# Patient Record
Sex: FEMALE | Race: WHITE | NOT HISPANIC OR LATINO | Employment: FULL TIME | URBAN - METROPOLITAN AREA
[De-identification: names, ages, dates, MRNs, and addresses within clinical notes are randomized per-mention and may not be internally consistent; named-entity substitution may affect disease eponyms.]

---

## 2018-05-20 ENCOUNTER — HOSPITAL ENCOUNTER (EMERGENCY)
Facility: HOSPITAL | Age: 63
Discharge: HOME | End: 2018-05-20
Attending: FAMILY MEDICINE

## 2018-05-20 VITALS
TEMPERATURE: 98 F | RESPIRATION RATE: 13 BRPM | HEART RATE: 67 BPM | SYSTOLIC BLOOD PRESSURE: 124 MMHG | OXYGEN SATURATION: 95 % | DIASTOLIC BLOOD PRESSURE: 70 MMHG

## 2018-05-20 DIAGNOSIS — R07.9 CHEST PAIN: ICD-10-CM

## 2018-05-20 DIAGNOSIS — M79.603 ARM PAIN: Primary | ICD-10-CM

## 2018-05-20 LAB
ALBUMIN SERPL BCP-MCNC: 4.1 G/DL
ALP SERPL-CCNC: 68 U/L
ALT SERPL W/O P-5'-P-CCNC: 67 U/L
ANION GAP SERPL CALC-SCNC: 8 MMOL/L
AST SERPL-CCNC: 51 U/L
BASOPHILS # BLD AUTO: 0.05 K/UL
BASOPHILS NFR BLD: 0.7 %
BILIRUB SERPL-MCNC: 0.5 MG/DL
BNP SERPL-MCNC: 56 PG/ML
BUN SERPL-MCNC: 12 MG/DL
CALCIUM SERPL-MCNC: 8.9 MG/DL
CHLORIDE SERPL-SCNC: 105 MMOL/L
CO2 SERPL-SCNC: 26 MMOL/L
CREAT SERPL-MCNC: 0.9 MG/DL
DIFFERENTIAL METHOD: NORMAL
EOSINOPHIL # BLD AUTO: 0.2 K/UL
EOSINOPHIL NFR BLD: 2.7 %
ERYTHROCYTE [DISTWIDTH] IN BLOOD BY AUTOMATED COUNT: 12.6 %
EST. GFR  (AFRICAN AMERICAN): >60 ML/MIN/1.73 M^2
EST. GFR  (NON AFRICAN AMERICAN): >60 ML/MIN/1.73 M^2
GLUCOSE SERPL-MCNC: 133 MG/DL
HCT VFR BLD AUTO: 43.5 %
HGB BLD-MCNC: 14.9 G/DL
IMM GRANULOCYTES # BLD AUTO: 0.02 K/UL
IMM GRANULOCYTES NFR BLD AUTO: 0.3 %
LYMPHOCYTES # BLD AUTO: 3.5 K/UL
LYMPHOCYTES NFR BLD: 48 %
MCH RBC QN AUTO: 30.8 PG
MCHC RBC AUTO-ENTMCNC: 34.3 G/DL
MCV RBC AUTO: 90 FL
MONOCYTES # BLD AUTO: 0.5 K/UL
MONOCYTES NFR BLD: 7.2 %
NEUTROPHILS # BLD AUTO: 3 K/UL
NEUTROPHILS NFR BLD: 41.1 %
NRBC BLD-RTO: 0 /100 WBC
PLATELET # BLD AUTO: 224 K/UL
PMV BLD AUTO: 9.5 FL
POTASSIUM SERPL-SCNC: 3.6 MMOL/L
PROT SERPL-MCNC: 7.6 G/DL
RBC # BLD AUTO: 4.83 M/UL
SODIUM SERPL-SCNC: 139 MMOL/L
TROPONIN I SERPL DL<=0.01 NG/ML-MCNC: 0.06 NG/ML
TROPONIN I SERPL DL<=0.01 NG/ML-MCNC: 0.11 NG/ML
WBC # BLD AUTO: 7.34 K/UL

## 2018-05-20 PROCEDURE — 96375 TX/PRO/DX INJ NEW DRUG ADDON: CPT

## 2018-05-20 PROCEDURE — 99284 EMERGENCY DEPT VISIT MOD MDM: CPT | Mod: 25

## 2018-05-20 PROCEDURE — 63600175 PHARM REV CODE 636 W HCPCS: Performed by: FAMILY MEDICINE

## 2018-05-20 PROCEDURE — 83880 ASSAY OF NATRIURETIC PEPTIDE: CPT

## 2018-05-20 PROCEDURE — 93005 ELECTROCARDIOGRAM TRACING: CPT

## 2018-05-20 PROCEDURE — 80053 COMPREHEN METABOLIC PANEL: CPT

## 2018-05-20 PROCEDURE — 84484 ASSAY OF TROPONIN QUANT: CPT | Mod: 91

## 2018-05-20 PROCEDURE — 25000003 PHARM REV CODE 250: Performed by: FAMILY MEDICINE

## 2018-05-20 PROCEDURE — 71045 X-RAY EXAM CHEST 1 VIEW: CPT | Mod: 26,,, | Performed by: RADIOLOGY

## 2018-05-20 PROCEDURE — 96374 THER/PROPH/DIAG INJ IV PUSH: CPT

## 2018-05-20 PROCEDURE — 85025 COMPLETE CBC W/AUTO DIFF WBC: CPT

## 2018-05-20 PROCEDURE — 71045 X-RAY EXAM CHEST 1 VIEW: CPT | Mod: TC,FY

## 2018-05-20 RX ORDER — ASPIRIN 325 MG
325 TABLET ORAL
Status: DISCONTINUED | OUTPATIENT
Start: 2018-05-20 | End: 2018-05-20

## 2018-05-20 RX ORDER — NITROGLYCERIN 0.4 MG/1
0.4 TABLET SUBLINGUAL EVERY 5 MIN PRN
Qty: 30 TABLET | Refills: 0 | Status: SHIPPED | OUTPATIENT
Start: 2018-05-20 | End: 2021-01-12 | Stop reason: CLARIF

## 2018-05-20 RX ORDER — CARVEDILOL 12.5 MG/1
12.5 TABLET ORAL 2 TIMES DAILY WITH MEALS
COMMUNITY
End: 2020-02-14 | Stop reason: CLARIF

## 2018-05-20 RX ORDER — ASPIRIN 81 MG/1
81 TABLET ORAL DAILY
COMMUNITY

## 2018-05-20 RX ORDER — GABAPENTIN 300 MG/1
300 CAPSULE ORAL NIGHTLY
COMMUNITY
End: 2020-02-14 | Stop reason: CLARIF

## 2018-05-20 RX ORDER — ONDANSETRON 2 MG/ML
4 INJECTION INTRAMUSCULAR; INTRAVENOUS
Status: COMPLETED | OUTPATIENT
Start: 2018-05-20 | End: 2018-05-20

## 2018-05-20 RX ORDER — LOSARTAN POTASSIUM 25 MG/1
25 TABLET ORAL DAILY
COMMUNITY
End: 2021-01-12 | Stop reason: CLARIF

## 2018-05-20 RX ORDER — FUROSEMIDE 40 MG/1
40 TABLET ORAL DAILY
COMMUNITY
End: 2021-01-12 | Stop reason: CLARIF

## 2018-05-20 RX ORDER — MORPHINE SULFATE 4 MG/ML
6 INJECTION, SOLUTION INTRAMUSCULAR; INTRAVENOUS
Status: COMPLETED | OUTPATIENT
Start: 2018-05-20 | End: 2018-05-20

## 2018-05-20 RX ORDER — DIGOXIN 250 MCG
250 TABLET ORAL DAILY
COMMUNITY
End: 2020-02-14 | Stop reason: CLARIF

## 2018-05-20 RX ORDER — PAROXETINE HYDROCHLORIDE 20 MG/1
20 TABLET, FILM COATED ORAL EVERY MORNING
COMMUNITY
End: 2020-01-02

## 2018-05-20 RX ADMIN — NITROGLYCERIN 0.5 INCH: 20 OINTMENT TOPICAL at 03:05

## 2018-05-20 RX ADMIN — ONDANSETRON 4 MG: 2 INJECTION INTRAMUSCULAR; INTRAVENOUS at 03:05

## 2018-05-20 RX ADMIN — MORPHINE SULFATE 6 MG: 4 INJECTION INTRAVENOUS at 03:05

## 2018-05-20 NOTE — ED PROVIDER NOTES
Encounter Date: 5/20/2018       History     Chief Complaint   Patient presents with    Arm Pain     Pt awoke with left arm and chest pain around 0230. Pain 7/10. No shortness of breath, no diaphoresis, no nausea. Hx of large MI with 2 stents placed. Sees Dr. Phillips.           Review of patient's allergies indicates:   Allergen Reactions    Demerol [meperidine]      Past Medical History:   Diagnosis Date    Coronary artery disease     Diabetes mellitus      No past surgical history on file.  No family history on file.  Social History   Substance Use Topics    Smoking status: Not on file    Smokeless tobacco: Not on file    Alcohol use Not on file     Review of Systems   Constitutional: Negative.    HENT: Negative.    Eyes: Negative.    Respiratory: Positive for chest tightness. Negative for shortness of breath.    Cardiovascular: Positive for chest pain.   Gastrointestinal: Negative.    Endocrine: Negative.    Genitourinary: Negative.    Musculoskeletal: Negative.    Allergic/Immunologic: Negative.    Neurological: Negative.    Hematological: Negative.    Psychiatric/Behavioral: Negative.        Physical Exam     Initial Vitals [05/20/18 0235]   BP Pulse Resp Temp SpO2   (!) 143/87 79 17 97.7 °F (36.5 °C) 97 %      MAP       105.67         Physical Exam    Nursing note and vitals reviewed.  Constitutional: She appears well-developed and well-nourished. She is not diaphoretic. No distress.   HENT:   Head: Normocephalic and atraumatic.   Eyes: Conjunctivae and EOM are normal. Pupils are equal, round, and reactive to light.   Neck: Normal range of motion. Neck supple.   Cardiovascular: Normal rate, regular rhythm, normal heart sounds and intact distal pulses. Exam reveals no gallop and no friction rub.    No murmur heard.  Pulmonary/Chest: Breath sounds normal. No respiratory distress. She has no wheezes. She has no rales.   Abdominal: Soft. Bowel sounds are normal. She exhibits no distension. There is no  tenderness.   Musculoskeletal: Normal range of motion. She exhibits no edema.   Neurological: She is alert and oriented to person, place, and time. She has normal strength.   Skin: Skin is warm and dry. Capillary refill takes less than 2 seconds. No rash noted. No erythema.   Psychiatric: She has a normal mood and affect. Her behavior is normal. Judgment and thought content normal.         ED Course   Procedures  Labs Reviewed   CBC W/ AUTO DIFFERENTIAL   COMPREHENSIVE METABOLIC PANEL   TROPONIN I   TROPONIN I   B-TYPE NATRIURETIC PEPTIDE             Medical Decision Making:   ED Management:  Chest pain onset at 0230 with relief after nitro. Assumed care of this pt at 0600, she has demonstrated two negative troponins and remains chest pain free. Discussed results with the pt, who is already established with cardiology. She would like to follow up with Dr. Phillips on Monday and agrees to return to ED should symptoms return, worsen, or persist.                       Clinical Impression:   The primary encounter diagnosis was Arm pain. A diagnosis of Chest pain was also pertinent to this visit.                           Marcella Carson MD  05/20/18 4928

## 2018-05-20 NOTE — ED NOTES
No acute changes since arrival. Pt remains on cardiac monitor.  Updated on plan of care. Will repeat troponin at 0550. Warm blanket provided. Will continue to monitor.

## 2018-05-20 NOTE — ED NOTES
Pt resting comfortably with lights dimmed. Remains on cardiac monitor. NSR on cardiac monitor. Updated on plan of care. Will continue to monitor.

## 2020-01-02 ENCOUNTER — HOSPITAL ENCOUNTER (EMERGENCY)
Facility: HOSPITAL | Age: 65
Discharge: HOME | End: 2020-01-02
Attending: FAMILY MEDICINE
Payer: COMMERCIAL

## 2020-01-02 VITALS
HEIGHT: 68 IN | OXYGEN SATURATION: 94 % | DIASTOLIC BLOOD PRESSURE: 59 MMHG | HEART RATE: 75 BPM | BODY MASS INDEX: 30.16 KG/M2 | TEMPERATURE: 98 F | SYSTOLIC BLOOD PRESSURE: 109 MMHG | RESPIRATION RATE: 10 BRPM | WEIGHT: 199 LBS

## 2020-01-02 DIAGNOSIS — R00.2 PALPITATION: ICD-10-CM

## 2020-01-02 LAB
ALBUMIN SERPL BCP-MCNC: 4.3 G/DL (ref 3.5–5.2)
ALP SERPL-CCNC: 73 U/L (ref 55–135)
ALT SERPL W/O P-5'-P-CCNC: 27 U/L (ref 10–44)
ANION GAP SERPL CALC-SCNC: 17 MMOL/L (ref 8–16)
AST SERPL-CCNC: 23 U/L (ref 10–40)
BASOPHILS # BLD AUTO: 0.04 K/UL (ref 0–0.2)
BASOPHILS NFR BLD: 0.4 % (ref 0–1.9)
BILIRUB SERPL-MCNC: 0.5 MG/DL (ref 0.1–1)
BNP SERPL-MCNC: 104 PG/ML (ref 0–99)
BUN SERPL-MCNC: 17 MG/DL (ref 8–23)
CALCIUM SERPL-MCNC: 9.2 MG/DL (ref 8.7–10.5)
CHLORIDE SERPL-SCNC: 103 MMOL/L (ref 95–110)
CO2 SERPL-SCNC: 17 MMOL/L (ref 23–29)
CREAT SERPL-MCNC: 0.7 MG/DL (ref 0.5–1.4)
DIFFERENTIAL METHOD: ABNORMAL
EOSINOPHIL # BLD AUTO: 0.1 K/UL (ref 0–0.5)
EOSINOPHIL NFR BLD: 0.9 % (ref 0–8)
ERYTHROCYTE [DISTWIDTH] IN BLOOD BY AUTOMATED COUNT: 12.5 % (ref 11.5–14.5)
EST. GFR  (AFRICAN AMERICAN): >60 ML/MIN/1.73 M^2
EST. GFR  (NON AFRICAN AMERICAN): >60 ML/MIN/1.73 M^2
GLUCOSE SERPL-MCNC: 188 MG/DL (ref 70–110)
HCT VFR BLD AUTO: 45.6 % (ref 37–48.5)
HGB BLD-MCNC: 15.2 G/DL (ref 12–16)
IMM GRANULOCYTES # BLD AUTO: 0.02 K/UL (ref 0–0.04)
IMM GRANULOCYTES NFR BLD AUTO: 0.2 % (ref 0–0.5)
LYMPHOCYTES # BLD AUTO: 3.4 K/UL (ref 1–4.8)
LYMPHOCYTES NFR BLD: 36.5 % (ref 18–48)
MCH RBC QN AUTO: 30.3 PG (ref 27–31)
MCHC RBC AUTO-ENTMCNC: 33.3 G/DL (ref 32–36)
MCV RBC AUTO: 91 FL (ref 82–98)
MONOCYTES # BLD AUTO: 0.6 K/UL (ref 0.3–1)
MONOCYTES NFR BLD: 6.2 % (ref 4–15)
NEUTROPHILS # BLD AUTO: 5.2 K/UL (ref 1.8–7.7)
NEUTROPHILS NFR BLD: 55.8 % (ref 38–73)
NRBC BLD-RTO: 0 /100 WBC
PLATELET # BLD AUTO: 231 K/UL (ref 150–350)
PMV BLD AUTO: 8.9 FL (ref 9.2–12.9)
POCT GLUCOSE: 167 MG/DL (ref 70–110)
POTASSIUM SERPL-SCNC: 3.3 MMOL/L (ref 3.5–5.1)
PROT SERPL-MCNC: 8.2 G/DL (ref 6–8.4)
RBC # BLD AUTO: 5.01 M/UL (ref 4–5.4)
SODIUM SERPL-SCNC: 137 MMOL/L (ref 136–145)
TROPONIN I SERPL DL<=0.01 NG/ML-MCNC: <0.01 NG/ML (ref 0.02–0.5)
WBC # BLD AUTO: 9.24 K/UL (ref 3.9–12.7)

## 2020-01-02 PROCEDURE — 84484 ASSAY OF TROPONIN QUANT: CPT

## 2020-01-02 PROCEDURE — 93010 EKG 12-LEAD: ICD-10-PCS | Mod: ,,, | Performed by: INTERNAL MEDICINE

## 2020-01-02 PROCEDURE — 93010 ELECTROCARDIOGRAM REPORT: CPT | Mod: ,,, | Performed by: INTERNAL MEDICINE

## 2020-01-02 PROCEDURE — 85025 COMPLETE CBC W/AUTO DIFF WBC: CPT

## 2020-01-02 PROCEDURE — 80053 COMPREHEN METABOLIC PANEL: CPT

## 2020-01-02 PROCEDURE — 36415 COLL VENOUS BLD VENIPUNCTURE: CPT

## 2020-01-02 PROCEDURE — 82962 GLUCOSE BLOOD TEST: CPT

## 2020-01-02 PROCEDURE — 83880 ASSAY OF NATRIURETIC PEPTIDE: CPT

## 2020-01-02 PROCEDURE — 99284 EMERGENCY DEPT VISIT MOD MDM: CPT | Mod: 25

## 2020-01-02 PROCEDURE — 93005 ELECTROCARDIOGRAM TRACING: CPT

## 2020-01-02 RX ORDER — BUPROPION HYDROCHLORIDE 300 MG/1
300 TABLET ORAL DAILY
COMMUNITY
End: 2020-02-14 | Stop reason: CLARIF

## 2020-01-02 RX ORDER — SPIRONOLACTONE 25 MG/1
25 TABLET ORAL DAILY
COMMUNITY
End: 2021-01-12 | Stop reason: CLARIF

## 2020-01-03 NOTE — DISCHARGE INSTRUCTIONS
Follow-up with Dr. Ritchie for your cardiologist, if symptoms change or return see MD or return to the ER

## 2020-01-03 NOTE — ED PROVIDER NOTES
Encounter Date: 1/2/2020       History     Chief Complaint   Patient presents with    pacemaker fired     64-year-old female presents complaining of stating her pacemaker defibrillator fired she denies sensation prior or post of palpitations and had no syncope or near-syncope pacemakers approximately 9 years old her prior pacemaker was replaced at 10 years she denies any trauma to the chest and she is followed by cardiologist in [facility] Dr. Phillips        Review of patient's allergies indicates:   Allergen Reactions    Demerol [meperidine]      Past Medical History:   Diagnosis Date    Coronary artery disease     Diabetes mellitus      Past Surgical History:   Procedure Laterality Date    A-V CARDIAC PACEMAKER INSERTION      HYSTERECTOMY      INSERTION OF PACEMAKER      REMOVAL OF IMPLANTED CARDIOVERTER-DEFIBRILLATOR (ICD)       History reviewed. No pertinent family history.  Social History     Tobacco Use    Smoking status: Never Smoker   Substance Use Topics    Alcohol use: Yes    Drug use: Never     Review of Systems   Constitutional: Negative for fever.   HENT: Negative for sore throat.    Respiratory: Negative for shortness of breath.    Cardiovascular: Negative for chest pain.   Gastrointestinal: Negative for nausea.   Genitourinary: Negative for dysuria.   Musculoskeletal: Negative for back pain.   Skin: Negative for rash.   Neurological: Negative for weakness.   Hematological: Does not bruise/bleed easily.       Physical Exam     Initial Vitals [01/02/20 2122]   BP Pulse Resp Temp SpO2   121/69 86 18 98 °F (36.7 °C) 95 %      MAP       --         Physical Exam    Nursing note and vitals reviewed.  Constitutional: She appears well-developed and well-nourished. She is not diaphoretic. No distress.   HENT:   Head: Normocephalic and atraumatic.   Right Ear: External ear normal.   Left Ear: External ear normal.   Eyes: Pupils are equal, round, and reactive to light. Right eye exhibits no discharge.  Left eye exhibits no discharge.   Neck: No tracheal deviation present. No JVD present.   Cardiovascular: Exam reveals no friction rub.    No murmur heard.  Pulmonary/Chest: No stridor. No respiratory distress. She has no wheezes. She has no rales.   Abdominal: Bowel sounds are normal. She exhibits no distension.   Musculoskeletal: Normal range of motion.   Neurological: She is alert.   Skin: Skin is warm.   Psychiatric: She has a normal mood and affect.         ED Course   Procedures  Labs Reviewed   CBC W/ AUTO DIFFERENTIAL - Abnormal; Notable for the following components:       Result Value    MPV 8.9 (*)     All other components within normal limits   COMPREHENSIVE METABOLIC PANEL - Abnormal; Notable for the following components:    Potassium 3.3 (*)     CO2 17 (*)     Glucose 188 (*)     Anion Gap 17 (*)     All other components within normal limits   TROPONIN I - Abnormal; Notable for the following components:    Troponin I <0.01 (*)     All other components within normal limits   B-TYPE NATRIURETIC PEPTIDE - Abnormal; Notable for the following components:     (*)     All other components within normal limits   POCT GLUCOSE - Abnormal; Notable for the following components:    POCT Glucose 167 (*)     All other components within normal limits   B-TYPE NATRIURETIC PEPTIDE     EKG Readings: (Independently Interpreted)   Initial Reading: No STEMI. Rhythm: Normal Sinus Rhythm. Heart Rate: 78. Ectopy: No Ectopy. Conduction: Normal. ST Segments: Normal ST Segments. T Waves: Normal.   There are no pacemaker spikes noted on the EKG       Imaging Results    None                                          Clinical Impression:       ICD-10-CM ICD-9-CM   1. Palpitation R00.2 785.1                             Zach Cowart MD  01/03/20 0543       Zach Cowart MD  01/03/20 0544

## 2020-02-06 ENCOUNTER — HOSPITAL ENCOUNTER (EMERGENCY)
Facility: HOSPITAL | Age: 65
Discharge: ANOTHER HOSPITAL | End: 2020-02-07
Attending: EMERGENCY MEDICINE
Payer: COMMERCIAL

## 2020-02-06 DIAGNOSIS — Z45.02 DEFIBRILLATOR DISCHARGE: ICD-10-CM

## 2020-02-06 DIAGNOSIS — R42 LIGHTHEADEDNESS: ICD-10-CM

## 2020-02-06 DIAGNOSIS — I47.20 VENTRICULAR TACHYCARDIA: Primary | ICD-10-CM

## 2020-02-06 LAB
ALBUMIN SERPL BCP-MCNC: 3.9 G/DL (ref 3.5–5.2)
ALP SERPL-CCNC: 77 U/L (ref 55–135)
ALT SERPL W/O P-5'-P-CCNC: 32 U/L (ref 10–44)
ANION GAP SERPL CALC-SCNC: 11 MMOL/L (ref 8–16)
AST SERPL-CCNC: 25 U/L (ref 10–40)
BASOPHILS # BLD AUTO: 0.06 K/UL (ref 0–0.2)
BASOPHILS NFR BLD: 0.7 % (ref 0–1.9)
BILIRUB SERPL-MCNC: 0.6 MG/DL (ref 0.1–1)
BNP SERPL-MCNC: 96 PG/ML (ref 0–99)
BUN SERPL-MCNC: 10 MG/DL (ref 8–23)
CALCIUM SERPL-MCNC: 9 MG/DL (ref 8.7–10.5)
CHLORIDE SERPL-SCNC: 103 MMOL/L (ref 95–110)
CO2 SERPL-SCNC: 24 MMOL/L (ref 23–29)
CREAT SERPL-MCNC: 0.7 MG/DL (ref 0.5–1.4)
DIFFERENTIAL METHOD: ABNORMAL
DIGOXIN SERPL-MCNC: <0.2 NG/ML (ref 0.8–2)
EOSINOPHIL # BLD AUTO: 0.2 K/UL (ref 0–0.5)
EOSINOPHIL NFR BLD: 1.6 % (ref 0–8)
ERYTHROCYTE [DISTWIDTH] IN BLOOD BY AUTOMATED COUNT: 12.2 % (ref 11.5–14.5)
EST. GFR  (AFRICAN AMERICAN): >60 ML/MIN/1.73 M^2
EST. GFR  (NON AFRICAN AMERICAN): >60 ML/MIN/1.73 M^2
GLUCOSE SERPL-MCNC: 180 MG/DL (ref 70–110)
HCT VFR BLD AUTO: 45.9 % (ref 37–48.5)
HGB BLD-MCNC: 15.9 G/DL (ref 12–16)
IMM GRANULOCYTES # BLD AUTO: 0.03 K/UL (ref 0–0.04)
IMM GRANULOCYTES NFR BLD AUTO: 0.3 % (ref 0–0.5)
LYMPHOCYTES # BLD AUTO: 2.6 K/UL (ref 1–4.8)
LYMPHOCYTES NFR BLD: 27.8 % (ref 18–48)
MCH RBC QN AUTO: 31.1 PG (ref 27–31)
MCHC RBC AUTO-ENTMCNC: 34.6 G/DL (ref 32–36)
MCV RBC AUTO: 90 FL (ref 82–98)
MONOCYTES # BLD AUTO: 0.8 K/UL (ref 0.3–1)
MONOCYTES NFR BLD: 8.7 % (ref 4–15)
NEUTROPHILS # BLD AUTO: 5.6 K/UL (ref 1.8–7.7)
NEUTROPHILS NFR BLD: 60.9 % (ref 38–73)
NRBC BLD-RTO: 0 /100 WBC
PLATELET # BLD AUTO: 276 K/UL (ref 150–350)
PMV BLD AUTO: 8.7 FL (ref 9.2–12.9)
POTASSIUM SERPL-SCNC: 3.9 MMOL/L (ref 3.5–5.1)
PROT SERPL-MCNC: 8 G/DL (ref 6–8.4)
RBC # BLD AUTO: 5.11 M/UL (ref 4–5.4)
SODIUM SERPL-SCNC: 138 MMOL/L (ref 136–145)
TROPONIN I SERPL DL<=0.01 NG/ML-MCNC: 0.05 NG/ML (ref 0.02–0.5)
TROPONIN I SERPL DL<=0.01 NG/ML-MCNC: 0.3 NG/ML (ref 0.02–0.5)
WBC # BLD AUTO: 9.21 K/UL (ref 3.9–12.7)

## 2020-02-06 PROCEDURE — 80053 COMPREHEN METABOLIC PANEL: CPT

## 2020-02-06 PROCEDURE — 99285 EMERGENCY DEPT VISIT HI MDM: CPT | Mod: 25

## 2020-02-06 PROCEDURE — 83880 ASSAY OF NATRIURETIC PEPTIDE: CPT

## 2020-02-06 PROCEDURE — 63600175 PHARM REV CODE 636 W HCPCS: Performed by: EMERGENCY MEDICINE

## 2020-02-06 PROCEDURE — 85025 COMPLETE CBC W/AUTO DIFF WBC: CPT

## 2020-02-06 PROCEDURE — 84484 ASSAY OF TROPONIN QUANT: CPT

## 2020-02-06 PROCEDURE — 93005 ELECTROCARDIOGRAM TRACING: CPT

## 2020-02-06 PROCEDURE — 71045 X-RAY EXAM CHEST 1 VIEW: CPT | Mod: TC,FY

## 2020-02-06 PROCEDURE — 71045 XR CHEST AP PORTABLE: ICD-10-PCS | Mod: 26,,, | Performed by: RADIOLOGY

## 2020-02-06 PROCEDURE — 96376 TX/PRO/DX INJ SAME DRUG ADON: CPT

## 2020-02-06 PROCEDURE — 99291 CRITICAL CARE FIRST HOUR: CPT | Mod: 25

## 2020-02-06 PROCEDURE — 96374 THER/PROPH/DIAG INJ IV PUSH: CPT

## 2020-02-06 PROCEDURE — 71045 X-RAY EXAM CHEST 1 VIEW: CPT | Mod: 26,,, | Performed by: RADIOLOGY

## 2020-02-06 PROCEDURE — 80162 ASSAY OF DIGOXIN TOTAL: CPT

## 2020-02-06 RX ORDER — ROSUVASTATIN CALCIUM 20 MG/1
20 TABLET, COATED ORAL DAILY
COMMUNITY
End: 2022-02-28 | Stop reason: ALTCHOICE

## 2020-02-06 RX ORDER — AMIODARONE HYDROCHLORIDE 150 MG/3ML
150 INJECTION, SOLUTION INTRAVENOUS
Status: COMPLETED | OUTPATIENT
Start: 2020-02-06 | End: 2020-02-06

## 2020-02-06 RX ADMIN — AMIODARONE HYDROCHLORIDE 1 MG/MIN: 1.8 INJECTION, SOLUTION INTRAVENOUS at 11:02

## 2020-02-06 RX ADMIN — AMIODARONE HYDROCHLORIDE 150 MG: 50 INJECTION, SOLUTION INTRAVENOUS at 11:02

## 2020-02-07 VITALS
BODY MASS INDEX: 28.79 KG/M2 | WEIGHT: 190 LBS | OXYGEN SATURATION: 97 % | HEIGHT: 68 IN | SYSTOLIC BLOOD PRESSURE: 129 MMHG | TEMPERATURE: 98 F | RESPIRATION RATE: 15 BRPM | HEART RATE: 79 BPM | DIASTOLIC BLOOD PRESSURE: 80 MMHG

## 2020-02-07 NOTE — ED NOTES
"Contacted [facility] regarding transfer status. Per Renea, "we are really busy right now with emegencies, they are next on the list"  "

## 2020-02-07 NOTE — ED TRIAGE NOTES
Pt to ed via ems with c/o defibrillator firing five times while at home.   Pt AAO on arrival. Pt has been seen previously for same symptoms.

## 2020-02-07 NOTE — ED NOTES
Sheryl at [facility] 24/7 line notified that patient needs her pacemaker interrogated as ordered by Dr. Clark. She reports that the rep will return a call to us and let us know when they will come out.

## 2020-02-07 NOTE — ED NOTES
Patient moved on [facility] stretcher without incident. Patient is AAOx4. Skin warm, dry to touch. Respirations even, nonlabored.

## 2020-02-07 NOTE — ED NOTES
Received phone call from [employer] Rep-will come interrogate defibrillator. Informed they will be here in approximately 2 hours-coming from [place]. MD and pt notified.

## 2020-02-07 NOTE — ED PROVIDER NOTES
Encounter Date: 2/6/2020       History     Chief Complaint   Patient presents with    Chest Pain     defibrillator firing 5 times.      The patient is a 64-year-old female who has a history of CAD, diabetes mellitus, and MI who presents after her AICD discharged multiple times.  The first time occurred 45 minutes prior to arrival while she was showering.  She had preceding lightheadedness.  She denies chest pain, palpitations, abdominal pain, nausea, vomiting, and shortness of breath. There were four additional discharges prior to ED arrival.  She is now feeling fatigued.  Her cardiologist is Dr. Phillips at [facility].     The history is provided by the patient. No  was used.     Review of patient's allergies indicates:   Allergen Reactions    Demerol [meperidine]      Past Medical History:   Diagnosis Date    Coronary artery disease     Diabetes mellitus     MI (myocardial infarction)      Past Surgical History:   Procedure Laterality Date    A-V CARDIAC PACEMAKER INSERTION      HYSTERECTOMY      INSERTION OF PACEMAKER      REMOVAL OF IMPLANTED CARDIOVERTER-DEFIBRILLATOR (ICD)       History reviewed. No pertinent family history.  Social History     Tobacco Use    Smoking status: Never Smoker   Substance Use Topics    Alcohol use: Not Currently    Drug use: Never     Review of Systems    Physical Exam     Initial Vitals [02/06/20 1943]   BP Pulse Resp Temp SpO2   (!) 184/143 (!) 112 16 98.3 °F (36.8 °C) 96 %      MAP       --         Physical Exam    Nursing note and vitals reviewed.  Constitutional: She appears well-developed and well-nourished. She is not diaphoretic. No distress.   HENT:   Head: Normocephalic and atraumatic.   Mouth/Throat: Oropharynx is clear and moist.   Eyes: Conjunctivae are normal. No scleral icterus.   Neck: Carotid bruit is not present. No JVD present.   Cardiovascular: An irregularly irregular rhythm present.  Tachycardia present.  Exam  reveals no gallop and no friction rub.    No murmur heard.  Pulses:       Radial pulses are 2+ on the right side, and 2+ on the left side.        Dorsalis pedis pulses are 2+ on the right side, and 2+ on the left side.   Pulmonary/Chest: Effort normal and breath sounds normal. No stridor. No respiratory distress. She has no decreased breath sounds. She has no wheezes. She has no rhonchi. She has no rales.   Abdominal: Soft. She exhibits no distension and no mass. There is no tenderness.   Musculoskeletal:   No calf swelling or tenderness bilaterally. Negative bilateral Melia's sign.   Neurological: She is alert and oriented to person, place, and time. She has normal strength. No cranial nerve deficit. Coordination normal. GCS eye subscore is 4. GCS verbal subscore is 5. GCS motor subscore is 6.   Skin: Skin is warm and dry. No pallor.         ED Course   Procedures  Labs Reviewed   CBC W/ AUTO DIFFERENTIAL - Abnormal; Notable for the following components:       Result Value    Mean Corpuscular Hemoglobin 31.1 (*)     MPV 8.7 (*)     All other components within normal limits   COMPREHENSIVE METABOLIC PANEL - Abnormal; Notable for the following components:    Glucose 180 (*)     All other components within normal limits   DIGOXIN LEVEL - Abnormal; Notable for the following components:    Digoxin Lvl <0.2 (*)     All other components within normal limits   TROPONIN I   B-TYPE NATRIURETIC PEPTIDE   TROPONIN I   DIGOXIN LEVEL     EKG Readings: (Independently Interpreted)   02/06/2020 19:41  Sinus tachycardia with frequent PVCs.  Ventricular rate 108 beats per minute. Normal axis.  Normal QRS and QT intervals.       Imaging Results          X-Ray Chest AP Portable (Preliminary result)  Result time 02/06/20 20:42:58    ED Interpretation by Yanick Clark III, MD (02/06/20 20:42:58, [facility] - ED, Emergency Medicine)    Independent interpretation by ED physician.  Cardiac silhouette at upper limits  of normal in size.  No mediastinal widening.  Implanted cardiac device present.  No infiltrates or effusions.  No acute processes.                               Medical Decision Making:   History:   Old Medical Records: I decided to obtain old medical records.  Old Records Summarized: other records.       <> Summary of Records: Reviewed past medical history, see HPI.  Independently Interpreted Test(s):   I have ordered and independently interpreted X-rays - see prior notes.  I have ordered and independently interpreted EKG Reading(s) - see prior notes  Clinical Tests:   Lab Tests: Ordered and Reviewed  Radiological Study: Ordered and Reviewed  Medical Tests: Ordered and Reviewed    Medical decision making:  This patient has undergone emergent evaluation after her AICD discharge multiple times.  She complained of fatigue on arrival.  Differential diagnoses included critical arrhythmia, acute coronary syndromes, new onset CHF, electrolyte anomalies.  She was evaluated with cardiac monitoring, serial exams, EKG, labs, and chest x-ray.  EKG reveals sinus tachycardia with frequent PVCs.  The decision was made to call out the representative for her AICD and having interrogated.  She underwent a period of monitoring in the ED and had no further discharges of the AICD.  Interrogation of the device showed multiple episodes of sustained ventricular tachycardia resulting and discharge of the device.  At the advisement of her cardiologist, the patient is being loaded with amiodarone and started on an infusion and is being transferred to [facility] for further evaluation and management.              Attending Attestation:         Attending Critical Care:   Critical Care Times:   Direct Patient Care (initial evaluation, reassessments, and time considering the case)................................................................15 minutes.   Ordering, Reviewing, and Interpreting Diagnostic  Studies...............................................................................................................5 minutes.   Documentation..................................................................................................................................................................................7 minutes.   Consultation with other Physicians. .................................................................................................................................................5 minutes.   ==============================================================  · Total Critical Care Time - exclusive of procedural time: 32 minutes.  ==============================================================  Critical care was necessary to treat or prevent imminent or life-threatening deterioration of the following conditions: cardiac arrhythmia.   Critical care was time spent personally by me on the following activities: obtaining history from patient or relative, examination of patient, review of old charts, ordering lab, x-rays, and/or EKG, development of treatment plan with patient or relative, ordering and performing treatments and interventions, evaluation of patient's response to treatment, discussion with consultants, interpretation of cardiac measurements and re-evaluation of patient's conition.   Critical Care Condition: potentially life-threatening               ED Course as of Feb 06 2345   Thu Feb 06, 2020 2310 Discussed the patient's presentation, exam, and workup with the patient's cardiologist, Dr. Phillips, who recommends loading her with amiodarone and transferring her to [facility].    [LP]      ED Course User Index  [LP] Yanick Clark III, MD                Clinical Impression:       ICD-10-CM ICD-9-CM   1. Ventricular tachycardia I47.2 427.1   2. Lightheadedness R42 780.4   3. Defibrillator discharge Z45.02 V71.89         Disposition:   Disposition: Transferred  Condition:  Serious                     Yanick Clark III, MD  02/06/20 8009

## 2020-02-14 ENCOUNTER — HOSPITAL ENCOUNTER (EMERGENCY)
Facility: HOSPITAL | Age: 65
Discharge: HOME | End: 2020-02-14
Attending: EMERGENCY MEDICINE
Payer: COMMERCIAL

## 2020-02-14 VITALS
WEIGHT: 190 LBS | TEMPERATURE: 98 F | OXYGEN SATURATION: 96 % | DIASTOLIC BLOOD PRESSURE: 74 MMHG | BODY MASS INDEX: 28.79 KG/M2 | HEIGHT: 68 IN | HEART RATE: 54 BPM | SYSTOLIC BLOOD PRESSURE: 132 MMHG | RESPIRATION RATE: 14 BRPM

## 2020-02-14 DIAGNOSIS — R00.2 PALPITATIONS: Primary | ICD-10-CM

## 2020-02-14 DIAGNOSIS — R07.9 CHEST PAIN: ICD-10-CM

## 2020-02-14 DIAGNOSIS — Z45.02 ENCOUNTER FOR CHECKING OF AUTOMATIC IMPLANTABLE CARDIOVERTER-DEFIBRILLATOR (AICD): ICD-10-CM

## 2020-02-14 LAB
ALBUMIN SERPL BCP-MCNC: 4.2 G/DL (ref 3.5–5.2)
ALP SERPL-CCNC: 81 U/L (ref 55–135)
ALT SERPL W/O P-5'-P-CCNC: 32 U/L (ref 10–44)
ANION GAP SERPL CALC-SCNC: 11 MMOL/L (ref 8–16)
AST SERPL-CCNC: 26 U/L (ref 10–40)
BASOPHILS # BLD AUTO: 0.03 K/UL (ref 0–0.2)
BASOPHILS NFR BLD: 0.4 % (ref 0–1.9)
BILIRUB SERPL-MCNC: 0.7 MG/DL (ref 0.1–1)
BNP SERPL-MCNC: 430 PG/ML (ref 0–99)
BUN SERPL-MCNC: 11 MG/DL (ref 8–23)
CALCIUM SERPL-MCNC: 9.4 MG/DL (ref 8.7–10.5)
CHLORIDE SERPL-SCNC: 102 MMOL/L (ref 95–110)
CO2 SERPL-SCNC: 26 MMOL/L (ref 23–29)
CREAT SERPL-MCNC: 0.9 MG/DL (ref 0.5–1.4)
DIFFERENTIAL METHOD: ABNORMAL
EOSINOPHIL # BLD AUTO: 0.1 K/UL (ref 0–0.5)
EOSINOPHIL NFR BLD: 1.2 % (ref 0–8)
ERYTHROCYTE [DISTWIDTH] IN BLOOD BY AUTOMATED COUNT: 12.3 % (ref 11.5–14.5)
EST. GFR  (AFRICAN AMERICAN): >60 ML/MIN/1.73 M^2
EST. GFR  (NON AFRICAN AMERICAN): >60 ML/MIN/1.73 M^2
GLUCOSE SERPL-MCNC: 136 MG/DL (ref 70–110)
HCT VFR BLD AUTO: 44 % (ref 37–48.5)
HGB BLD-MCNC: 15.1 G/DL (ref 12–16)
IMM GRANULOCYTES # BLD AUTO: 0.02 K/UL (ref 0–0.04)
IMM GRANULOCYTES NFR BLD AUTO: 0.3 % (ref 0–0.5)
LYMPHOCYTES # BLD AUTO: 2.3 K/UL (ref 1–4.8)
LYMPHOCYTES NFR BLD: 30.3 % (ref 18–48)
MCH RBC QN AUTO: 30.9 PG (ref 27–31)
MCHC RBC AUTO-ENTMCNC: 34.3 G/DL (ref 32–36)
MCV RBC AUTO: 90 FL (ref 82–98)
MONOCYTES # BLD AUTO: 0.4 K/UL (ref 0.3–1)
MONOCYTES NFR BLD: 5.6 % (ref 4–15)
NEUTROPHILS # BLD AUTO: 4.7 K/UL (ref 1.8–7.7)
NEUTROPHILS NFR BLD: 62.2 % (ref 38–73)
NRBC BLD-RTO: 0 /100 WBC
PLATELET # BLD AUTO: 295 K/UL (ref 150–350)
PMV BLD AUTO: 9 FL (ref 9.2–12.9)
POTASSIUM SERPL-SCNC: 3.8 MMOL/L (ref 3.5–5.1)
PROT SERPL-MCNC: 8.5 G/DL (ref 6–8.4)
RBC # BLD AUTO: 4.89 M/UL (ref 4–5.4)
SODIUM SERPL-SCNC: 139 MMOL/L (ref 136–145)
TROPONIN I SERPL DL<=0.01 NG/ML-MCNC: 0.01 NG/ML (ref 0.02–0.5)
TROPONIN I SERPL DL<=0.01 NG/ML-MCNC: 0.01 NG/ML (ref 0.02–0.5)
WBC # BLD AUTO: 7.47 K/UL (ref 3.9–12.7)

## 2020-02-14 PROCEDURE — 71045 XR CHEST AP PORTABLE: ICD-10-PCS | Mod: 26,,, | Performed by: RADIOLOGY

## 2020-02-14 PROCEDURE — 25000003 PHARM REV CODE 250: Performed by: EMERGENCY MEDICINE

## 2020-02-14 PROCEDURE — 80053 COMPREHEN METABOLIC PANEL: CPT

## 2020-02-14 PROCEDURE — 36415 COLL VENOUS BLD VENIPUNCTURE: CPT

## 2020-02-14 PROCEDURE — 93005 ELECTROCARDIOGRAM TRACING: CPT

## 2020-02-14 PROCEDURE — 83880 ASSAY OF NATRIURETIC PEPTIDE: CPT

## 2020-02-14 PROCEDURE — 85025 COMPLETE CBC W/AUTO DIFF WBC: CPT

## 2020-02-14 PROCEDURE — 93010 EKG 12-LEAD: ICD-10-PCS | Mod: ,,, | Performed by: INTERNAL MEDICINE

## 2020-02-14 PROCEDURE — 71045 X-RAY EXAM CHEST 1 VIEW: CPT | Mod: TC,FY

## 2020-02-14 PROCEDURE — 93010 ELECTROCARDIOGRAM REPORT: CPT | Mod: ,,, | Performed by: INTERNAL MEDICINE

## 2020-02-14 PROCEDURE — 84484 ASSAY OF TROPONIN QUANT: CPT

## 2020-02-14 PROCEDURE — 71045 X-RAY EXAM CHEST 1 VIEW: CPT | Mod: 26,,, | Performed by: RADIOLOGY

## 2020-02-14 PROCEDURE — 99284 EMERGENCY DEPT VISIT MOD MDM: CPT | Mod: 25

## 2020-02-14 RX ORDER — ASPIRIN 325 MG
325 TABLET ORAL
Status: COMPLETED | OUTPATIENT
Start: 2020-02-14 | End: 2020-02-14

## 2020-02-14 RX ORDER — METOPROLOL TARTRATE 25 MG/1
25 TABLET, FILM COATED ORAL 2 TIMES DAILY
COMMUNITY

## 2020-02-14 RX ORDER — AMIODARONE HYDROCHLORIDE 200 MG/1
TABLET ORAL DAILY
COMMUNITY

## 2020-02-14 RX ADMIN — ASPIRIN 325 MG ORAL TABLET 325 MG: 325 PILL ORAL at 03:02

## 2020-02-14 NOTE — ED NOTES
Spoke with Nancy CLINTON at [employer] who will page local rep for device interrogation per Dr. Carson.

## 2020-02-14 NOTE — ED PROVIDER NOTES
"Encounter Date: 2/14/2020       History     Chief Complaint   Patient presents with    Chest Pain     described as "squeezing pain" that began approximately 15min pta, reports pain is accompanied by a "fluttering" feeling of her heart     62-year-old female with past medical history of CAD, diabetes mellitus, CAD with MI, and [place] AICD in situ presents to the ED for evaluation of an episode of lightheadedness, dizziness, chest fluttering just prior to arrival.  States all symptoms have resolved; however she remains very concerned as she was recently seen for repeated appropriate AICD firing on 02/06/2020 when she was found to be sustained ventricular tachycardia.  At that time she was transferred to [facility] at the advice of her cardiologist - Dr. Mcknight, discharged on Monday, and is scheduled for follow-up for an unknown procedure on Tuesday.  Denies chest pain, dyspnea, diaphoresis, edema, weight gain. Denies fever, chills, recent illness.  Denies recent firing of the AICD.        Review of patient's allergies indicates:   Allergen Reactions    Demerol [meperidine]      Past Medical History:   Diagnosis Date    Coronary artery disease     Diabetes mellitus     MI (myocardial infarction)      Past Surgical History:   Procedure Laterality Date    A-V CARDIAC PACEMAKER INSERTION      HYSTERECTOMY      INSERTION OF PACEMAKER      REMOVAL OF IMPLANTED CARDIOVERTER-DEFIBRILLATOR (ICD)       History reviewed. No pertinent family history.  Social History     Tobacco Use    Smoking status: Never Smoker   Substance Use Topics    Alcohol use: Not Currently    Drug use: Never     Review of Systems   Constitutional: Negative for appetite change, chills, diaphoresis, fatigue and fever.   HENT: Negative for congestion, ear pain, rhinorrhea, sinus pressure, sinus pain, sore throat and tinnitus.    Eyes: Negative for photophobia and visual disturbance.   Respiratory: Negative for cough, chest " tightness, shortness of breath and wheezing.    Cardiovascular: Positive for chest pain and palpitations. Negative for leg swelling.   Gastrointestinal: Negative for abdominal pain, constipation, diarrhea, nausea and vomiting.   Endocrine: Negative for cold intolerance, heat intolerance, polydipsia, polyphagia and polyuria.   Genitourinary: Negative for decreased urine volume, difficulty urinating, dysuria, flank pain, frequency, hematuria and urgency.   Musculoskeletal: Negative for arthralgias, back pain, gait problem, joint swelling, myalgias, neck pain and neck stiffness.   Skin: Negative for color change, pallor, rash and wound.   Allergic/Immunologic: Negative for immunocompromised state.   Neurological: Positive for dizziness. Negative for syncope, weakness, light-headedness, numbness and headaches.   Hematological: Negative for adenopathy. Does not bruise/bleed easily.   Psychiatric/Behavioral: Negative for decreased concentration, dysphoric mood and sleep disturbance. The patient is not nervous/anxious.    All other systems reviewed and are negative.      Physical Exam     Initial Vitals [02/14/20 1545]   BP Pulse Resp Temp SpO2   (!) 180/96 65 18 98.2 °F (36.8 °C) 97 %      MAP       --         Physical Exam    Nursing note and vitals reviewed.  Constitutional: She appears well-developed and well-nourished. She is not diaphoretic. No distress.   HENT:   Head: Normocephalic and atraumatic.   Right Ear: External ear normal.   Left Ear: External ear normal.   Nose: Nose normal.   Mouth/Throat: Oropharynx is clear and moist.   Eyes: Conjunctivae are normal. Pupils are equal, round, and reactive to light. No scleral icterus.   Neck: Normal range of motion. Neck supple. No JVD present.   Cardiovascular: Regular rhythm, normal heart sounds and intact distal pulses. Bradycardia present.    Pulmonary/Chest: Breath sounds normal. No respiratory distress. She has no wheezes. She has no rhonchi. She has no rales. She  exhibits no tenderness.   Abdominal: Soft. Bowel sounds are normal. She exhibits no distension. There is no tenderness. There is no rebound and no guarding.   Musculoskeletal: Normal range of motion. She exhibits no edema or tenderness.   Lymphadenopathy:     She has no cervical adenopathy.   Neurological: She is alert and oriented to person, place, and time. GCS score is 15. GCS eye subscore is 4. GCS verbal subscore is 5. GCS motor subscore is 6.   Skin: Skin is warm and dry. Capillary refill takes less than 2 seconds. No rash noted. No erythema. No pallor.   Psychiatric: Her speech is normal and behavior is normal. Judgment and thought content normal. Her mood appears anxious. Cognition and memory are normal.         ED Course   Procedures  Labs Reviewed   CBC W/ AUTO DIFFERENTIAL - Abnormal; Notable for the following components:       Result Value    MPV 9.0 (*)     All other components within normal limits    Narrative:     Recoll. [id number] by KBVidible at 02/14/2020 16:14, reason: Specimen   clotted   COMPREHENSIVE METABOLIC PANEL - Abnormal; Notable for the following components:    Glucose 136 (*)     Total Protein 8.5 (*)     All other components within normal limits   TROPONIN I - Abnormal; Notable for the following components:    Troponin I 0.01 (*)     All other components within normal limits   B-TYPE NATRIURETIC PEPTIDE - Abnormal; Notable for the following components:     (*)     All other components within normal limits    Narrative:     Recoll. [id number] by KBVidible at 02/14/2020 16:15, reason: Specimen   clotted   TROPONIN I - Abnormal; Notable for the following components:    Troponin I 0.01 (*)     All other components within normal limits     EKG Readings: (Independently Interpreted)   Initial Reading: No STEMI. Rhythm: Normal Sinus Rhythm. Heart Rate: 60. Ectopy: No Ectopy. Conduction: Normal. ST Segments: Normal ST Segments. T Waves: Normal. Axis: Normal. Clinical Impression: Normal Sinus  Rhythm       Imaging Results          X-Ray Chest AP Portable (Final result)  Result time 02/14/20 16:04:43    Final result by Sheryl Pepper MD (02/14/20 16:04:43)                 Impression:      Stable cardiomegaly, defibrillator.  No acute abnormality.      Electronically signed by: Sheryl Pepper  Date:    02/14/2020  Time:    16:04             Narrative:    EXAMINATION:  XR CHEST AP PORTABLE    CLINICAL HISTORY:  chest pain;    TECHNIQUE:  Single frontal view of the chest was performed.    COMPARISON:  02/06/2020    FINDINGS:  Portable upright view of the chest was obtained.  A better inspiration has been taken compared to the prior study.  The lungs are well inflated and clear.  The cardiac silhouette is stable with a defibrillator present.  There is no mediastinal abnormality, pleural effusion, pneumothorax or acute bony abnormality.                                 Medical Decision Making:   Differential Diagnosis:   Critical arrhythmia, acute coronary syndromes, new onset CHF, electrolyte anomalies  ED Management:  This patient has undergone urgent evaluation of palpitations. She will be evaluated with cardiac monitoring, serial exams, EKG, labs, and chest x-ray.    EKG reveals normal sinus; however, given her recent history,the decision was made to call out the representative for her AICD for interrogation.  The device was determined to be functioning normally with no episodes of ventricular tachycardia, PVCs, critical arrhythmias since February 6th.  Repeat troponin scheduled for 1900.     Case discussed and care transitioned to Dr. Glez at 1800.                                    Clinical Impression:       ICD-10-CM ICD-9-CM   1. Palpitations R00.2 785.1   2. Chest pain R07.9 786.50   3. Encounter for checking of automatic implantable cardioverter-defibrillator (AICD) Z45.02 V53.32         Disposition:   Disposition: Discharged                     Marcella Carson MD  02/16/20 0608

## 2020-02-15 ENCOUNTER — HOSPITAL ENCOUNTER (EMERGENCY)
Facility: HOSPITAL | Age: 65
Discharge: ANOTHER HOSPITAL | End: 2020-02-16
Attending: INTERNAL MEDICINE
Payer: COMMERCIAL

## 2020-02-15 DIAGNOSIS — R07.9 CHEST PAIN: ICD-10-CM

## 2020-02-15 DIAGNOSIS — F41.9 ANXIETY: ICD-10-CM

## 2020-02-15 DIAGNOSIS — I20.0 UNSTABLE ANGINA PECTORIS: Primary | ICD-10-CM

## 2020-02-15 LAB
ALBUMIN SERPL BCP-MCNC: 3.8 G/DL (ref 3.5–5.2)
ALP SERPL-CCNC: 73 U/L (ref 55–135)
ALT SERPL W/O P-5'-P-CCNC: 35 U/L (ref 10–44)
ANION GAP SERPL CALC-SCNC: 11 MMOL/L (ref 8–16)
AST SERPL-CCNC: 22 U/L (ref 10–40)
BASOPHILS # BLD AUTO: 0.05 K/UL (ref 0–0.2)
BASOPHILS NFR BLD: 0.6 % (ref 0–1.9)
BILIRUB SERPL-MCNC: 0.7 MG/DL (ref 0.1–1)
BNP SERPL-MCNC: 186 PG/ML (ref 0–99)
BUN SERPL-MCNC: 12 MG/DL (ref 8–23)
CALCIUM SERPL-MCNC: 8.8 MG/DL (ref 8.7–10.5)
CHLORIDE SERPL-SCNC: 105 MMOL/L (ref 95–110)
CO2 SERPL-SCNC: 24 MMOL/L (ref 23–29)
CREAT SERPL-MCNC: 0.9 MG/DL (ref 0.5–1.4)
DIFFERENTIAL METHOD: ABNORMAL
EOSINOPHIL # BLD AUTO: 0.2 K/UL (ref 0–0.5)
EOSINOPHIL NFR BLD: 1.8 % (ref 0–8)
ERYTHROCYTE [DISTWIDTH] IN BLOOD BY AUTOMATED COUNT: 12.6 % (ref 11.5–14.5)
EST. GFR  (AFRICAN AMERICAN): >60 ML/MIN/1.73 M^2
EST. GFR  (NON AFRICAN AMERICAN): >60 ML/MIN/1.73 M^2
GLUCOSE SERPL-MCNC: 132 MG/DL (ref 70–110)
HCT VFR BLD AUTO: 45.8 % (ref 37–48.5)
HGB BLD-MCNC: 15.4 G/DL (ref 12–16)
IMM GRANULOCYTES # BLD AUTO: 0.03 K/UL (ref 0–0.04)
IMM GRANULOCYTES NFR BLD AUTO: 0.3 % (ref 0–0.5)
LYMPHOCYTES # BLD AUTO: 2.7 K/UL (ref 1–4.8)
LYMPHOCYTES NFR BLD: 30.4 % (ref 18–48)
MCH RBC QN AUTO: 30.6 PG (ref 27–31)
MCHC RBC AUTO-ENTMCNC: 33.6 G/DL (ref 32–36)
MCV RBC AUTO: 91 FL (ref 82–98)
MONOCYTES # BLD AUTO: 0.7 K/UL (ref 0.3–1)
MONOCYTES NFR BLD: 7.7 % (ref 4–15)
NEUTROPHILS # BLD AUTO: 5.2 K/UL (ref 1.8–7.7)
NEUTROPHILS NFR BLD: 59.2 % (ref 38–73)
NRBC BLD-RTO: 0 /100 WBC
PLATELET # BLD AUTO: 295 K/UL (ref 150–350)
PMV BLD AUTO: 9.1 FL (ref 9.2–12.9)
POTASSIUM SERPL-SCNC: 3.7 MMOL/L (ref 3.5–5.1)
PROT SERPL-MCNC: 8 G/DL (ref 6–8.4)
RBC # BLD AUTO: 5.04 M/UL (ref 4–5.4)
SODIUM SERPL-SCNC: 140 MMOL/L (ref 136–145)
TROPONIN I SERPL DL<=0.01 NG/ML-MCNC: 0.01 NG/ML (ref 0.02–0.5)
TROPONIN I SERPL DL<=0.01 NG/ML-MCNC: 0.02 NG/ML (ref 0.02–0.5)
WBC # BLD AUTO: 8.78 K/UL (ref 3.9–12.7)

## 2020-02-15 PROCEDURE — 99285 EMERGENCY DEPT VISIT HI MDM: CPT | Mod: 25

## 2020-02-15 PROCEDURE — 25000003 PHARM REV CODE 250: Performed by: INTERNAL MEDICINE

## 2020-02-15 PROCEDURE — 80053 COMPREHEN METABOLIC PANEL: CPT

## 2020-02-15 PROCEDURE — 96374 THER/PROPH/DIAG INJ IV PUSH: CPT

## 2020-02-15 PROCEDURE — 63600175 PHARM REV CODE 636 W HCPCS: Performed by: INTERNAL MEDICINE

## 2020-02-15 PROCEDURE — 71045 XR CHEST AP PORTABLE: ICD-10-PCS | Mod: 26,,, | Performed by: RADIOLOGY

## 2020-02-15 PROCEDURE — 85025 COMPLETE CBC W/AUTO DIFF WBC: CPT

## 2020-02-15 PROCEDURE — 71045 X-RAY EXAM CHEST 1 VIEW: CPT | Mod: TC,FY

## 2020-02-15 PROCEDURE — 83880 ASSAY OF NATRIURETIC PEPTIDE: CPT

## 2020-02-15 PROCEDURE — 71045 X-RAY EXAM CHEST 1 VIEW: CPT | Mod: 26,,, | Performed by: RADIOLOGY

## 2020-02-15 PROCEDURE — 96375 TX/PRO/DX INJ NEW DRUG ADDON: CPT

## 2020-02-15 PROCEDURE — 93005 ELECTROCARDIOGRAM TRACING: CPT

## 2020-02-15 PROCEDURE — 84484 ASSAY OF TROPONIN QUANT: CPT | Mod: 91

## 2020-02-15 RX ORDER — ONDANSETRON 2 MG/ML
8 INJECTION INTRAMUSCULAR; INTRAVENOUS
Status: COMPLETED | OUTPATIENT
Start: 2020-02-15 | End: 2020-02-15

## 2020-02-15 RX ORDER — ASPIRIN 325 MG
325 TABLET ORAL
Status: COMPLETED | OUTPATIENT
Start: 2020-02-15 | End: 2020-02-15

## 2020-02-15 RX ORDER — LORAZEPAM 2 MG/ML
1 INJECTION INTRAMUSCULAR
Status: COMPLETED | OUTPATIENT
Start: 2020-02-15 | End: 2020-02-15

## 2020-02-15 RX ADMIN — LORAZEPAM 1 MG: 2 INJECTION INTRAMUSCULAR; INTRAVENOUS at 07:02

## 2020-02-15 RX ADMIN — ASPIRIN 325 MG ORAL TABLET 325 MG: 325 PILL ORAL at 06:02

## 2020-02-15 RX ADMIN — ONDANSETRON 8 MG: 2 INJECTION INTRAMUSCULAR; INTRAVENOUS at 10:02

## 2020-02-15 RX ADMIN — NITROGLYCERIN 1 INCH: 20 OINTMENT TOPICAL at 06:02

## 2020-02-15 NOTE — DISCHARGE INSTRUCTIONS
ContinueCurrent medications.  Follow with primary care physician if no improvement worsening  Return here if immediate worsening are other problems.

## 2020-02-15 NOTE — ED NOTES
[employer]  Representative presents for interrogation of implanted device. No alerts to display .

## 2020-02-16 VITALS
DIASTOLIC BLOOD PRESSURE: 66 MMHG | SYSTOLIC BLOOD PRESSURE: 119 MMHG | BODY MASS INDEX: 28.79 KG/M2 | WEIGHT: 190 LBS | HEIGHT: 68 IN | HEART RATE: 51 BPM | OXYGEN SATURATION: 97 % | TEMPERATURE: 98 F | RESPIRATION RATE: 15 BRPM

## 2020-02-16 NOTE — ED PROVIDER NOTES
Encounter Date: 2/15/2020       History     Chief Complaint   Patient presents with    Chest Pain     onset approx 10 min pta. Patient reports a burning pain to the midsternal area of the chest pain 6/10. Patient reports 'feeling like my heart is racing' and breaking out in a cold sweat.     Palpitations     Patient is a 64-year-old female that presented to the emergency department complaining of chest burning.  Patient reports this is in the midsternal area and also feeling like her heart is racing.  Patient is also had palpitations throughout today.  Patient was seen yesterday in the emergency department here for the same reason.  Patient has recent had a pacer defibrillator placed and this was interrogated yesterday and found be working properly.  Patient did have an episode last week where her defibrillator went off she states 6 times.  Patient has had no nausea vomiting diarrhea or recent intercurrent illness.  Patient sees a cardiologist in [place] as her primary cardiologist.  Patient has past medical history of AV cardiac pacemaker insertion., coronary artery disease, diabetes mellitus, and myocardial infarction.  Patient has status post hysterectomy as well.  She states no firing of her automatic defibrillator.  EKG on arrival today revealed normal sinus rhythm with anterior septal infarct age undetermined by criteria but no other ECG changes suggestive ischemia.        Review of patient's allergies indicates:   Allergen Reactions    Demerol [meperidine]      Past Medical History:   Diagnosis Date    Coronary artery disease     Diabetes mellitus     MI (myocardial infarction)      Past Surgical History:   Procedure Laterality Date    A-V CARDIAC PACEMAKER INSERTION      HYSTERECTOMY      INSERTION OF PACEMAKER      REMOVAL OF IMPLANTED CARDIOVERTER-DEFIBRILLATOR (ICD)       History reviewed. No pertinent family history.  Social History     Tobacco Use    Smoking status: Never Smoker   Substance  Use Topics    Alcohol use: Not Currently    Drug use: Never     Review of Systems   Constitutional: Positive for fatigue. Negative for activity change, appetite change and fever.   HENT: Negative for congestion, ear discharge, mouth sores, nosebleeds, rhinorrhea, sinus pressure, sinus pain and tinnitus.    Eyes: Negative.  Negative for pain, redness and itching.   Respiratory: Positive for chest tightness. Negative for apnea, cough, choking, shortness of breath, wheezing and stridor.    Cardiovascular: Negative for chest pain, palpitations and leg swelling.   Gastrointestinal: Negative for abdominal distention, abdominal pain, anal bleeding, blood in stool, constipation and diarrhea.   Endocrine: Negative.    Genitourinary: Negative for difficulty urinating, flank pain, frequency and urgency.   Musculoskeletal: Negative for arthralgias, back pain, gait problem and myalgias.   Skin: Negative for color change and pallor.   Allergic/Immunologic: Negative.    Neurological: Positive for weakness and light-headedness. Negative for dizziness, facial asymmetry and headaches.   Hematological: Negative for adenopathy. Does not bruise/bleed easily.   Psychiatric/Behavioral: The patient is nervous/anxious.        Physical Exam     Initial Vitals [02/15/20 1759]   BP Pulse Resp Temp SpO2   (!) 165/89 65 (!) 21 98.2 °F (36.8 °C) 98 %      MAP       --         Physical Exam    Nursing note and vitals reviewed.  Constitutional: She appears well-developed and well-nourished.   Eyes: EOM are normal.   Neck: Normal range of motion. Neck supple.   Cardiovascular: Normal rate, regular rhythm, normal heart sounds and intact distal pulses.   Abdominal: Soft. Bowel sounds are normal.   Musculoskeletal: Normal range of motion.   Neurological: She is alert and oriented to person, place, and time. GCS score is 15. GCS eye subscore is 4. GCS verbal subscore is 5. GCS motor subscore is 6.   Psychiatric: She has a normal mood and affect.  Thought content normal.         ED Course   Procedures  Labs Reviewed   CBC W/ AUTO DIFFERENTIAL   COMPREHENSIVE METABOLIC PANEL   TROPONIN I   TROPONIN I   B-TYPE NATRIURETIC PEPTIDE          Imaging Results    None       X-Rays:   Independently Interpreted Readings:   Other Readings:  Chest x-ray shows status post pacemaker placement no infiltrates no effusion and cardiac silhouette appears normal.    Medical Decision Making:   Clinical Tests:   Lab Tests: Ordered and Reviewed  The following lab test(s) were unremarkable: CBC and CMP       <> Summary of Lab: CMP:  No acute abnormality.  CBC normal  Troponin levels less than 0.01  ECG showed normal sinus rhythm septal infarct age undetermined and otherwise no acute ST T wave changes.                                 Clinical Impression:       ICD-10-CM ICD-9-CM   1. Unstable angina pectoris I20.0 411.1   2. Chest pain R07.9 786.50   3. Anxiety F41.9 300.00                             Fer Glez MD  02/15/20 1954

## 2020-02-16 NOTE — ED NOTES
RN calls [facility] to check on status, spoke with Sherri. She states they are working on getting an ambulance to ED soon to transport patient.

## 2020-02-16 NOTE — ED NOTES
RN calls [facility] and requests update on transfer, patient was schedule for a priority 3 transport and is currently 4th on the list.    RN calls [facility], spoke with Marleny. RN explains that the patient was origionally intended to be a priority 2 transport and requests that the patient be upgraded through [facility]. Marleny states that Abbie arranged this because they are at the [facility] until 2200. Marleny states she will call and upgrade the transfer. Jaja martinez RN informed of this.    Patient and family updated.

## 2020-02-16 NOTE — ED NOTES
RN receives call from [facility] to inquire if the patient is still being transported to their facility. RN informs them that [facility] has not arrived yet to transport patient.

## 2020-12-07 ENCOUNTER — HOSPITAL ENCOUNTER (EMERGENCY)
Facility: HOSPITAL | Age: 65
Discharge: HOME | End: 2020-12-07
Payer: COMMERCIAL

## 2020-12-07 VITALS
SYSTOLIC BLOOD PRESSURE: 137 MMHG | HEIGHT: 68 IN | DIASTOLIC BLOOD PRESSURE: 98 MMHG | HEART RATE: 66 BPM | WEIGHT: 198 LBS | RESPIRATION RATE: 18 BRPM | OXYGEN SATURATION: 96 % | TEMPERATURE: 98 F | BODY MASS INDEX: 30.01 KG/M2

## 2020-12-07 DIAGNOSIS — M25.552 HIP PAIN, BILATERAL: ICD-10-CM

## 2020-12-07 DIAGNOSIS — M25.521 RIGHT ELBOW PAIN: ICD-10-CM

## 2020-12-07 DIAGNOSIS — M25.551 HIP PAIN, BILATERAL: ICD-10-CM
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
## 2020-12-07 DIAGNOSIS — M25.562 LEFT KNEE PAIN: ICD-10-CM

## 2020-12-07 PROCEDURE — 73080 X-RAY EXAM OF ELBOW: CPT | Mod: 26,RT,, | Performed by: RADIOLOGY

## 2020-12-07 PROCEDURE — 73562 X-RAY EXAM OF KNEE 3: CPT | Mod: TC,FY,LT

## 2020-12-07 PROCEDURE — 73562 XR KNEE 3 VIEW LEFT: ICD-10-PCS | Mod: 26,LT,, | Performed by: RADIOLOGY

## 2020-12-07 PROCEDURE — 73562 X-RAY EXAM OF KNEE 3: CPT | Mod: 26,LT,, | Performed by: RADIOLOGY

## 2020-12-07 PROCEDURE — 72170 X-RAY EXAM OF PELVIS: CPT | Mod: TC,FY

## 2020-12-07 PROCEDURE — 99284 EMERGENCY DEPT VISIT MOD MDM: CPT | Mod: 25

## 2020-12-07 PROCEDURE — 73080 X-RAY EXAM OF ELBOW: CPT | Mod: TC,FY,RT

## 2020-12-07 PROCEDURE — 72170 X-RAY EXAM OF PELVIS: CPT | Mod: 26,,, | Performed by: RADIOLOGY

## 2020-12-07 PROCEDURE — 72170 XR PELVIS ROUTINE AP: ICD-10-PCS | Mod: 26,,, | Performed by: RADIOLOGY

## 2020-12-07 PROCEDURE — 73080 XR ELBOW COMPLETE 3 VIEW RIGHT: ICD-10-PCS | Mod: 26,RT,, | Performed by: RADIOLOGY

## 2020-12-07 RX ORDER — SACUBITRIL AND VALSARTAN 24; 26 MG/1; MG/1
1 TABLET, FILM COATED ORAL 2 TIMES DAILY
COMMUNITY
End: 2022-02-28 | Stop reason: ALTCHOICE

## 2020-12-07 RX ORDER — BUPROPION HYDROCHLORIDE 150 MG/1
150 TABLET ORAL DAILY
COMMUNITY
End: 2022-02-28 | Stop reason: SDUPTHER

## 2020-12-07 RX ORDER — GABAPENTIN 300 MG/1
300 CAPSULE ORAL DAILY
COMMUNITY

## 2020-12-07 NOTE — DISCHARGE INSTRUCTIONS
Take over-the-counter anti-inflammatories or Tylenol for pain.  Apply ice as needed.  Return for any worsening or new symptoms. Follow up with Primary Care Provider in the next 2-3 days.

## 2020-12-07 NOTE — ED TRIAGE NOTES
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 States she slipped on a mopped floor. Pt c/o right hip pain, left knee pain, and right elbow pain. Denies LOC.

## 2020-12-07 NOTE — ED PROVIDER NOTES
Please note that my documentation in this Electronic Healthcare Record was produced using speech recognition software and therefore may contain errors related to that software.These could include grammar, punctuation and spelling errors or the inclusion/ exclusion of phrases that were not intended. Please contact myself for any clarification, questions or concerns.    HPI: Patient is a 65 y.o. female who presents with the chief complaint of fall that occurred at work today.  Denies hitting her head or losing any consciousness.  Patient states she slipped on some water.  Currently complaining of left knee pain, bilateral hip pain, and right elbow pain.  Patient rates her pain a 5/10.  No extremity weakness or paresthesias.  Denies any chest pain, shortness of breath, lightheadedness, dizziness, other trauma or injury.  Has not taken any medication today.  Came straight here from work.  She has known history of CAD, diabetes, and MI.  Does take multiple medications regularly.    REVIEW OF SYSTEMS - 10 systems were independently reviewed and are otherwise negative with the exception of those items previously documented in the HPI and nursing notes.    Allergy: Demerol [meperidine]    Past medical history:   Past Medical History:   Diagnosis Date    Coronary artery disease     Diabetes mellitus     MI (myocardial infarction)        Surgical History:   Past Surgical History:   Procedure Laterality Date    A-V CARDIAC PACEMAKER INSERTION      HYSTERECTOMY      INSERTION OF PACEMAKER      REMOVAL OF IMPLANTED CARDIOVERTER-DEFIBRILLATOR (ICD)         Social history:   Social History     Socioeconomic History    Marital status:      Spouse name: Not on file    Number of children: Not on file    Years of education: Not on file    Highest education level: Not on file   Occupational History    Not on file   Social Needs    Financial resource strain: Not on file    Food insecurity     Worry: Not on file     " Inability: Not on file    Transportation needs     Medical: Not on file     Non-medical: Not on file   Tobacco Use    Smoking status: Never Smoker   Substance and Sexual Activity    Alcohol use: Not Currently    Drug use: Never    Sexual activity: Yes   Lifestyle    Physical activity     Days per week: Not on file     Minutes per session: Not on file    Stress: Not on file   Relationships    Social connections     Talks on phone: Not on file     Gets together: Not on file     Attends Judaism service: Not on file     Active member of club or organization: Not on file     Attends meetings of clubs or organizations: Not on file     Relationship status: Not on file   Other Topics Concern    Not on file   Social History Narrative    Not on file       Family history: non-contributory    EHR: reviewed    Vitals: BP (!) 137/98   Pulse 66   Temp 97.8 °F (36.6 °C)   Resp 18   Ht 5' 8" (1.727 m)   Wt 89.8 kg (198 lb)   SpO2 96%   Breastfeeding No   BMI 30.11 kg/m²     PHYSICAL EXAM:    General-65-year-old female awake and alert, oriented, GCS 15, in no acute distress,  HEENT- normocephalic, atraumatic, sclera anicteric, moist mucous membranes, PERRL, EOMI  CARDIOVASCULAR- regular rate and rhythm  PULMONARY- nonlabored, no respiratory distress  NEUROLOGIC- mental status normal, speech fluid, cognition normal, CN II-XII grossly intact, sensations equal normal bilateral upper and lower extremities, peripheral pulse 2 +/4, ambulatory with proper gait.  MUSCULOSKELETAL- well-nourished, well-developed, mild tenderness to the posterior right elbow joint, anterior left knee joint, left paraspinal lumbar region.  No point midline tenderness of cervical, thoracic, or lumbar spine.  Full range of motion of the bilateral hip joints, knee joints, other joints of the upper and lower extremity.  No abrasions or bruising noted.  DERMATOLOGIC- warm and dry, no visible rashes  PSYCHIATRIC- normal affect, normal " concentration           Labs Reviewed - No data to display    X-Ray Elbow Complete Right   Final Result      No acute abnormality.         Electronically signed by: Sheryl Pepper   Date:    12/07/2020   Time:    16:29      X-Ray Pelvis Routine AP   Final Result      No acute abnormality.         Electronically signed by: Sheryl Pepper   Date:    12/07/2020   Time:    16:28      X-Ray Knee 3 View Left   Final Result      No acute abnormality.         Electronically signed by: Sheryl Pepper   Date:    12/07/2020   Time:    16:27          MEDICAL DECISION MAKING: Patient is a 65 y.o. female who presented with chief complaint of fall that occurred at work.  Patient states she feels okay but was told to come here.  Complaining of some right elbow, left knee, and bilateral hip pain.  Denies any extremity weakness or paresthesias.  No loss of consciousness or head trauma.  On examination, she has full range of motion of all joints of the upper lower extremity.  Does not have any midline tenderness of the spine.  Does have some tenderness in the left lower paraspinal lumbar region as well as to the posterior right elbow joint and anterior left knee joint.  No obvious effusion or bruising noted.  X-rays of the pelvis, elbow, and knee are unremarkable.  Patient advised to take Tylenol and to follow-up with her primary.  Work form was completed.  She will be discharged home in stable condition.    CLINICAL IMPRESSION:  1. Fall, initial encounter    2. Hip pain, bilateral    3. Right elbow pain    4. Left knee pain         JOCELYN Duff  12/07/20 1828

## 2021-01-07 ENCOUNTER — HOSPITAL ENCOUNTER (EMERGENCY)
Facility: HOSPITAL | Age: 66
Discharge: HOME | End: 2021-01-07
Attending: FAMILY MEDICINE
Payer: COMMERCIAL

## 2021-01-07 VITALS
HEART RATE: 69 BPM | SYSTOLIC BLOOD PRESSURE: 130 MMHG | HEIGHT: 68 IN | BODY MASS INDEX: 30.01 KG/M2 | WEIGHT: 198 LBS | OXYGEN SATURATION: 98 % | TEMPERATURE: 98 F | DIASTOLIC BLOOD PRESSURE: 75 MMHG | RESPIRATION RATE: 18 BRPM

## 2021-01-07 DIAGNOSIS — R11.2 NON-INTRACTABLE VOMITING WITH NAUSEA, UNSPECIFIED VOMITING TYPE: ICD-10-CM

## 2021-01-07 DIAGNOSIS — R19.7 DIARRHEA, UNSPECIFIED TYPE: ICD-10-CM

## 2021-01-07 DIAGNOSIS — R10.9 ABDOMINAL PAIN, UNSPECIFIED ABDOMINAL LOCATION: Primary | ICD-10-CM

## 2021-01-07 DIAGNOSIS — R10.9 ABDOMINAL PAIN: ICD-10-CM

## 2021-01-07 LAB
ALBUMIN SERPL BCP-MCNC: 3.6 G/DL (ref 3.5–5.2)
ALP SERPL-CCNC: 65 U/L (ref 55–135)
ALT SERPL W/O P-5'-P-CCNC: 36 U/L (ref 10–44)
ANION GAP SERPL CALC-SCNC: 9 MMOL/L (ref 8–16)
AST SERPL-CCNC: 30 U/L (ref 10–40)
BACTERIA #/AREA URNS HPF: NORMAL /HPF
BASOPHILS # BLD AUTO: 0.01 K/UL (ref 0–0.2)
BASOPHILS NFR BLD: 0.1 % (ref 0–1.9)
BILIRUB SERPL-MCNC: 0.7 MG/DL (ref 0.1–1)
BILIRUB UR QL STRIP: NEGATIVE
BUN SERPL-MCNC: 15 MG/DL (ref 8–23)
CALCIUM SERPL-MCNC: 9 MG/DL (ref 8.7–10.5)
CHLORIDE SERPL-SCNC: 106 MMOL/L (ref 95–110)
CLARITY UR: CLEAR
CO2 SERPL-SCNC: 22 MMOL/L (ref 23–29)
COLOR UR: YELLOW
CREAT SERPL-MCNC: 0.7 MG/DL (ref 0.5–1.4)
DIFFERENTIAL METHOD: ABNORMAL
EOSINOPHIL # BLD AUTO: 0.1 K/UL (ref 0–0.5)
EOSINOPHIL NFR BLD: 0.7 % (ref 0–8)
ERYTHROCYTE [DISTWIDTH] IN BLOOD BY AUTOMATED COUNT: 12.3 % (ref 11.5–14.5)
EST. GFR  (AFRICAN AMERICAN): >60 ML/MIN/1.73 M^2
EST. GFR  (NON AFRICAN AMERICAN): >60 ML/MIN/1.73 M^2
GLUCOSE SERPL-MCNC: 166 MG/DL (ref 70–110)
GLUCOSE UR QL STRIP: ABNORMAL
HCT VFR BLD AUTO: 49.5 % (ref 37–48.5)
HGB BLD-MCNC: 16.1 G/DL (ref 12–16)
HGB UR QL STRIP: NEGATIVE
IMM GRANULOCYTES # BLD AUTO: 0.03 K/UL (ref 0–0.04)
IMM GRANULOCYTES NFR BLD AUTO: 0.4 % (ref 0–0.5)
KETONES UR QL STRIP: NEGATIVE
LEUKOCYTE ESTERASE UR QL STRIP: NEGATIVE
LYMPHOCYTES # BLD AUTO: 0.9 K/UL (ref 1–4.8)
LYMPHOCYTES NFR BLD: 11.5 % (ref 18–48)
MAGNESIUM SERPL-MCNC: 1.8 MG/DL (ref 1.6–2.6)
MCH RBC QN AUTO: 30.6 PG (ref 27–31)
MCHC RBC AUTO-ENTMCNC: 32.5 G/DL (ref 32–36)
MCV RBC AUTO: 94 FL (ref 82–98)
MICROSCOPIC COMMENT: NORMAL
MONOCYTES # BLD AUTO: 0.4 K/UL (ref 0.3–1)
MONOCYTES NFR BLD: 4.4 % (ref 4–15)
NEUTROPHILS # BLD AUTO: 6.7 K/UL (ref 1.8–7.7)
NEUTROPHILS NFR BLD: 82.9 % (ref 38–73)
NITRITE UR QL STRIP: NEGATIVE
NRBC BLD-RTO: 0 /100 WBC
PH UR STRIP: 5 [PH] (ref 5–8)
PLATELET # BLD AUTO: 215 K/UL (ref 150–350)
PMV BLD AUTO: 9.4 FL (ref 9.2–12.9)
POTASSIUM SERPL-SCNC: 3.8 MMOL/L (ref 3.5–5.1)
PROT SERPL-MCNC: 7 G/DL (ref 6–8.4)
PROT UR QL STRIP: NEGATIVE
RBC # BLD AUTO: 5.26 M/UL (ref 4–5.4)
RBC #/AREA URNS HPF: 1 /HPF (ref 0–4)
SODIUM SERPL-SCNC: 137 MMOL/L (ref 136–145)
SP GR UR STRIP: 1.01 (ref 1–1.03)
TROPONIN I SERPL DL<=0.01 NG/ML-MCNC: <0.01 NG/ML (ref 0.02–0.5)
URN SPEC COLLECT METH UR: ABNORMAL
UROBILINOGEN UR STRIP-ACNC: NEGATIVE EU/DL
WBC # BLD AUTO: 8.1 K/UL (ref 3.9–12.7)
WBC #/AREA URNS HPF: 3 /HPF (ref 0–5)
YEAST URNS QL MICRO: NORMAL

## 2021-01-07 PROCEDURE — 96361 HYDRATE IV INFUSION ADD-ON: CPT

## 2021-01-07 PROCEDURE — 74019 XR ABDOMEN FLAT AND ERECT: ICD-10-PCS | Mod: 26,,, | Performed by: RADIOLOGY

## 2021-01-07 PROCEDURE — 80053 COMPREHEN METABOLIC PANEL: CPT

## 2021-01-07 PROCEDURE — 81000 URINALYSIS NONAUTO W/SCOPE: CPT

## 2021-01-07 PROCEDURE — 74019 RADEX ABDOMEN 2 VIEWS: CPT | Mod: 26,,, | Performed by: RADIOLOGY

## 2021-01-07 PROCEDURE — 96374 THER/PROPH/DIAG INJ IV PUSH: CPT

## 2021-01-07 PROCEDURE — 99284 EMERGENCY DEPT VISIT MOD MDM: CPT | Mod: 25

## 2021-01-07 PROCEDURE — 25000003 PHARM REV CODE 250: Performed by: FAMILY MEDICINE

## 2021-01-07 PROCEDURE — 83735 ASSAY OF MAGNESIUM: CPT

## 2021-01-07 PROCEDURE — 85025 COMPLETE CBC W/AUTO DIFF WBC: CPT

## 2021-01-07 PROCEDURE — 84484 ASSAY OF TROPONIN QUANT: CPT

## 2021-01-07 PROCEDURE — 74019 RADEX ABDOMEN 2 VIEWS: CPT | Mod: TC,FY

## 2021-01-07 PROCEDURE — 63600175 PHARM REV CODE 636 W HCPCS: Performed by: FAMILY MEDICINE

## 2021-01-07 RX ORDER — ONDANSETRON 2 MG/ML
4 INJECTION INTRAMUSCULAR; INTRAVENOUS
Status: COMPLETED | OUTPATIENT
Start: 2021-01-07 | End: 2021-01-07

## 2021-01-07 RX ORDER — LOPERAMIDE HYDROCHLORIDE 2 MG/1
4 CAPSULE ORAL
Status: COMPLETED | OUTPATIENT
Start: 2021-01-07 | End: 2021-01-07

## 2021-01-07 RX ORDER — ONDANSETRON 4 MG/1
4 TABLET, ORALLY DISINTEGRATING ORAL EVERY 6 HOURS PRN
Qty: 20 TABLET | Refills: 0 | Status: SHIPPED | OUTPATIENT
Start: 2021-01-07 | End: 2021-01-12 | Stop reason: CLARIF

## 2021-01-07 RX ORDER — HYOSCYAMINE SULFATE 0.125 MG
125 TABLET ORAL EVERY 4 HOURS PRN
Qty: 30 TABLET | Refills: 0 | Status: SHIPPED | OUTPATIENT
Start: 2021-01-07 | End: 2021-02-06

## 2021-01-07 RX ORDER — HYOSCYAMINE SULFATE 0.12 MG/1
TABLET SUBLINGUAL
Status: DISCONTINUED
Start: 2021-01-07 | End: 2021-01-08 | Stop reason: HOSPADM

## 2021-01-07 RX ORDER — LEVOTHYROXINE SODIUM 50 UG/1
50 TABLET ORAL
COMMUNITY
End: 2022-02-28 | Stop reason: SDUPTHER

## 2021-01-07 RX ORDER — MAG HYDROX/ALUMINUM HYD/SIMETH 200-200-20
15 SUSPENSION, ORAL (FINAL DOSE FORM) ORAL
Status: COMPLETED | OUTPATIENT
Start: 2021-01-07 | End: 2021-01-07

## 2021-01-07 RX ORDER — HYOSCYAMINE SULFATE 0.12 MG/1
0.12 TABLET SUBLINGUAL
Status: COMPLETED | OUTPATIENT
Start: 2021-01-07 | End: 2021-01-07

## 2021-01-07 RX ADMIN — SODIUM CHLORIDE 1000 ML: 0.9 INJECTION, SOLUTION INTRAVENOUS at 07:01

## 2021-01-07 RX ADMIN — ALUMINUM HYDROXIDE, MAGNESIUM HYDROXIDE, AND SIMETHICONE 15 ML: 200; 200; 20 SUSPENSION ORAL at 07:01

## 2021-01-07 RX ADMIN — ONDANSETRON 4 MG: 2 INJECTION INTRAMUSCULAR; INTRAVENOUS at 09:01

## 2021-01-07 RX ADMIN — HYOSCYAMINE SULFATE 0.12 MG: 0.12 TABLET ORAL; SUBLINGUAL at 08:01

## 2021-01-07 RX ADMIN — LOPERAMIDE HYDROCHLORIDE 4 MG: 2 CAPSULE ORAL at 11:01

## 2021-01-12 ENCOUNTER — HOSPITAL ENCOUNTER (EMERGENCY)
Facility: HOSPITAL | Age: 66
Discharge: HOME | End: 2021-01-12
Attending: EMERGENCY MEDICINE
Payer: COMMERCIAL

## 2021-01-12 VITALS
SYSTOLIC BLOOD PRESSURE: 105 MMHG | DIASTOLIC BLOOD PRESSURE: 56 MMHG | TEMPERATURE: 98 F | RESPIRATION RATE: 18 BRPM | OXYGEN SATURATION: 98 % | BODY MASS INDEX: 32.74 KG/M2 | HEART RATE: 66 BPM | HEIGHT: 68 IN | WEIGHT: 216 LBS

## 2021-01-12 DIAGNOSIS — N39.0 URINARY TRACT INFECTION WITHOUT HEMATURIA, SITE UNSPECIFIED: ICD-10-CM

## 2021-01-12 DIAGNOSIS — K63.89 MASS OF CECUM: ICD-10-CM

## 2021-01-12 DIAGNOSIS — R11.2 NON-INTRACTABLE VOMITING WITH NAUSEA, UNSPECIFIED VOMITING TYPE: Primary | ICD-10-CM

## 2021-01-12 DIAGNOSIS — R10.30 LOWER ABDOMINAL PAIN: ICD-10-CM

## 2021-01-12 LAB
ALBUMIN SERPL BCP-MCNC: 3.5 G/DL (ref 3.5–5.2)
ALP SERPL-CCNC: 71 U/L (ref 55–135)
ALT SERPL W/O P-5'-P-CCNC: 27 U/L (ref 10–44)
ANION GAP SERPL CALC-SCNC: 11 MMOL/L (ref 8–16)
AST SERPL-CCNC: 24 U/L (ref 10–40)
BACTERIA #/AREA URNS HPF: ABNORMAL /HPF
BASOPHILS # BLD AUTO: 0.03 K/UL (ref 0–0.2)
BASOPHILS NFR BLD: 0.3 % (ref 0–1.9)
BILIRUB SERPL-MCNC: 1 MG/DL (ref 0.1–1)
BILIRUB UR QL STRIP: NEGATIVE
BUN SERPL-MCNC: 11 MG/DL (ref 8–23)
CALCIUM SERPL-MCNC: 8.9 MG/DL (ref 8.7–10.5)
CHLORIDE SERPL-SCNC: 105 MMOL/L (ref 95–110)
CLARITY UR: CLEAR
CO2 SERPL-SCNC: 25 MMOL/L (ref 23–29)
COLOR UR: ABNORMAL
CREAT SERPL-MCNC: 0.8 MG/DL (ref 0.5–1.4)
DIFFERENTIAL METHOD: ABNORMAL
EOSINOPHIL # BLD AUTO: 0.2 K/UL (ref 0–0.5)
EOSINOPHIL NFR BLD: 1.6 % (ref 0–8)
ERYTHROCYTE [DISTWIDTH] IN BLOOD BY AUTOMATED COUNT: 12.4 % (ref 11.5–14.5)
EST. GFR  (AFRICAN AMERICAN): >60 ML/MIN/1.73 M^2
EST. GFR  (NON AFRICAN AMERICAN): >60 ML/MIN/1.73 M^2
GLUCOSE SERPL-MCNC: 142 MG/DL (ref 70–110)
GLUCOSE UR QL STRIP: ABNORMAL
HCT VFR BLD AUTO: 54.3 % (ref 37–48.5)
HGB BLD-MCNC: 18 G/DL (ref 12–16)
HGB UR QL STRIP: NEGATIVE
IMM GRANULOCYTES # BLD AUTO: 0.11 K/UL (ref 0–0.04)
IMM GRANULOCYTES NFR BLD AUTO: 0.9 % (ref 0–0.5)
KETONES UR QL STRIP: NEGATIVE
LEUKOCYTE ESTERASE UR QL STRIP: NEGATIVE
LIPASE SERPL-CCNC: 18 U/L (ref 4–60)
LYMPHOCYTES # BLD AUTO: 1.3 K/UL (ref 1–4.8)
LYMPHOCYTES NFR BLD: 11.1 % (ref 18–48)
MCH RBC QN AUTO: 30.8 PG (ref 27–31)
MCHC RBC AUTO-ENTMCNC: 33.1 G/DL (ref 32–36)
MCV RBC AUTO: 93 FL (ref 82–98)
MICROSCOPIC COMMENT: ABNORMAL
MONOCYTES # BLD AUTO: 0.5 K/UL (ref 0.3–1)
MONOCYTES NFR BLD: 4.5 % (ref 4–15)
NEUTROPHILS # BLD AUTO: 9.5 K/UL (ref 1.8–7.7)
NEUTROPHILS NFR BLD: 81.6 % (ref 38–73)
NITRITE UR QL STRIP: POSITIVE
NRBC BLD-RTO: 0 /100 WBC
PH UR STRIP: 5 [PH] (ref 5–8)
PLATELET # BLD AUTO: 250 K/UL (ref 150–350)
PMV BLD AUTO: 9.3 FL (ref 9.2–12.9)
POCT GLUCOSE: 119 MG/DL (ref 70–110)
POTASSIUM SERPL-SCNC: 3.7 MMOL/L (ref 3.5–5.1)
PROT SERPL-MCNC: 6.9 G/DL (ref 6–8.4)
PROT UR QL STRIP: NEGATIVE
RBC # BLD AUTO: 5.85 M/UL (ref 4–5.4)
RBC #/AREA URNS HPF: 2 /HPF (ref 0–4)
SARS-COV-2 RDRP RESP QL NAA+PROBE: NEGATIVE
SODIUM SERPL-SCNC: 141 MMOL/L (ref 136–145)
SP GR UR STRIP: 1.01 (ref 1–1.03)
SQUAMOUS #/AREA URNS HPF: 5 /HPF
URN SPEC COLLECT METH UR: ABNORMAL
UROBILINOGEN UR STRIP-ACNC: NEGATIVE EU/DL
WBC # BLD AUTO: 11.66 K/UL (ref 3.9–12.7)
WBC #/AREA URNS HPF: 17 /HPF (ref 0–5)

## 2021-01-12 PROCEDURE — 85025 COMPLETE CBC W/AUTO DIFF WBC: CPT

## 2021-01-12 PROCEDURE — 74177 CT ABDOMEN PELVIS WITH CONTRAST: ICD-10-PCS | Mod: 26,,, | Performed by: RADIOLOGY

## 2021-01-12 PROCEDURE — U0002 COVID-19 LAB TEST NON-CDC: HCPCS

## 2021-01-12 PROCEDURE — 25000003 PHARM REV CODE 250: Performed by: PHYSICIAN ASSISTANT

## 2021-01-12 PROCEDURE — 87077 CULTURE AEROBIC IDENTIFY: CPT

## 2021-01-12 PROCEDURE — 82962 GLUCOSE BLOOD TEST: CPT

## 2021-01-12 PROCEDURE — 25500020 PHARM REV CODE 255: Performed by: EMERGENCY MEDICINE

## 2021-01-12 PROCEDURE — 63600175 PHARM REV CODE 636 W HCPCS: Performed by: PHYSICIAN ASSISTANT

## 2021-01-12 PROCEDURE — 99285 EMERGENCY DEPT VISIT HI MDM: CPT | Mod: 25

## 2021-01-12 PROCEDURE — 74177 CT ABD & PELVIS W/CONTRAST: CPT | Mod: TC

## 2021-01-12 PROCEDURE — 96375 TX/PRO/DX INJ NEW DRUG ADDON: CPT

## 2021-01-12 PROCEDURE — 87088 URINE BACTERIA CULTURE: CPT

## 2021-01-12 PROCEDURE — 80053 COMPREHEN METABOLIC PANEL: CPT

## 2021-01-12 PROCEDURE — 87186 SC STD MICRODIL/AGAR DIL: CPT

## 2021-01-12 PROCEDURE — 96361 HYDRATE IV INFUSION ADD-ON: CPT

## 2021-01-12 PROCEDURE — 87086 URINE CULTURE/COLONY COUNT: CPT

## 2021-01-12 PROCEDURE — 74177 CT ABD & PELVIS W/CONTRAST: CPT | Mod: 26,,, | Performed by: RADIOLOGY

## 2021-01-12 PROCEDURE — 83690 ASSAY OF LIPASE: CPT

## 2021-01-12 PROCEDURE — 96365 THER/PROPH/DIAG IV INF INIT: CPT

## 2021-01-12 PROCEDURE — 81000 URINALYSIS NONAUTO W/SCOPE: CPT

## 2021-01-12 RX ORDER — OXYCODONE AND ACETAMINOPHEN 5; 325 MG/1; MG/1
1 TABLET ORAL EVERY 6 HOURS PRN
Qty: 8 TABLET | Refills: 0 | Status: SHIPPED | OUTPATIENT
Start: 2021-01-12 | End: 2021-01-14

## 2021-01-12 RX ORDER — CEPHALEXIN 500 MG/1
500 CAPSULE ORAL EVERY 12 HOURS
Qty: 14 CAPSULE | Refills: 0 | Status: SHIPPED | OUTPATIENT
Start: 2021-01-12 | End: 2021-01-19

## 2021-01-12 RX ORDER — DICYCLOMINE HYDROCHLORIDE 20 MG/1
20 TABLET ORAL 3 TIMES DAILY
Qty: 9 TABLET | Refills: 0 | Status: SHIPPED | OUTPATIENT
Start: 2021-01-12 | End: 2021-01-15

## 2021-01-12 RX ORDER — ONDANSETRON 2 MG/ML
4 INJECTION INTRAMUSCULAR; INTRAVENOUS
Status: COMPLETED | OUTPATIENT
Start: 2021-01-12 | End: 2021-01-12

## 2021-01-12 RX ORDER — MORPHINE SULFATE 4 MG/ML
4 INJECTION, SOLUTION INTRAMUSCULAR; INTRAVENOUS
Status: COMPLETED | OUTPATIENT
Start: 2021-01-12 | End: 2021-01-12

## 2021-01-12 RX ADMIN — SODIUM CHLORIDE 1000 ML: 0.9 INJECTION, SOLUTION INTRAVENOUS at 03:01

## 2021-01-12 RX ADMIN — MORPHINE SULFATE 4 MG: 4 INJECTION INTRAVENOUS at 03:01

## 2021-01-12 RX ADMIN — IOHEXOL 100 ML: 350 INJECTION, SOLUTION INTRAVENOUS at 04:01

## 2021-01-12 RX ADMIN — CEFTRIAXONE 1 G: 1 INJECTION, SOLUTION INTRAVENOUS at 05:01

## 2021-01-12 RX ADMIN — ONDANSETRON 4 MG: 2 INJECTION INTRAMUSCULAR; INTRAVENOUS at 03:01

## 2021-01-15 LAB — BACTERIA UR CULT: ABNORMAL

## 2021-03-04 ENCOUNTER — IMMUNIZATION (OUTPATIENT)
Dept: FAMILY MEDICINE | Facility: CLINIC | Age: 66
End: 2021-03-04

## 2021-03-04 DIAGNOSIS — Z23 NEED FOR VACCINATION: Primary | ICD-10-CM

## 2021-03-04 PROCEDURE — 91301 COVID-19, MRNA, LNP-S, PF, 100 MCG/0.5 ML DOSE VACCINE: ICD-10-PCS | Mod: S$GLB,,, | Performed by: FAMILY MEDICINE

## 2021-03-04 PROCEDURE — 0011A COVID-19, MRNA, LNP-S, PF, 100 MCG/0.5 ML DOSE VACCINE: ICD-10-PCS | Mod: CV19,S$GLB,, | Performed by: FAMILY MEDICINE

## 2021-03-04 PROCEDURE — 0011A COVID-19, MRNA, LNP-S, PF, 100 MCG/0.5 ML DOSE VACCINE: CPT | Mod: CV19,S$GLB,, | Performed by: FAMILY MEDICINE

## 2021-03-04 PROCEDURE — 91301 COVID-19, MRNA, LNP-S, PF, 100 MCG/0.5 ML DOSE VACCINE: CPT | Mod: S$GLB,,, | Performed by: FAMILY MEDICINE

## 2021-04-01 ENCOUNTER — IMMUNIZATION (OUTPATIENT)
Dept: FAMILY MEDICINE | Facility: CLINIC | Age: 66
End: 2021-04-01
Payer: COMMERCIAL

## 2021-04-01 DIAGNOSIS — Z23 NEED FOR VACCINATION: Primary | ICD-10-CM

## 2021-04-01 PROCEDURE — 91301 COVID-19, MRNA, LNP-S, PF, 100 MCG/0.5 ML DOSE VACCINE: ICD-10-PCS | Mod: S$GLB,,, | Performed by: FAMILY MEDICINE

## 2021-04-01 PROCEDURE — 91301 COVID-19, MRNA, LNP-S, PF, 100 MCG/0.5 ML DOSE VACCINE: CPT | Mod: S$GLB,,, | Performed by: FAMILY MEDICINE

## 2021-04-01 PROCEDURE — 0012A COVID-19, MRNA, LNP-S, PF, 100 MCG/0.5 ML DOSE VACCINE: CPT | Mod: CV19,S$GLB,, | Performed by: FAMILY MEDICINE

## 2021-04-01 PROCEDURE — 0012A COVID-19, MRNA, LNP-S, PF, 100 MCG/0.5 ML DOSE VACCINE: ICD-10-PCS | Mod: CV19,S$GLB,, | Performed by: FAMILY MEDICINE

## 2021-11-12 ENCOUNTER — HOSPITAL ENCOUNTER (EMERGENCY)
Facility: HOSPITAL | Age: 66
Discharge: HOME | End: 2021-11-12
Attending: FAMILY MEDICINE
Payer: COMMERCIAL

## 2021-11-12 VITALS
RESPIRATION RATE: 16 BRPM | HEART RATE: 67 BPM | OXYGEN SATURATION: 95 % | HEIGHT: 68 IN | BODY MASS INDEX: 32.58 KG/M2 | WEIGHT: 215 LBS | TEMPERATURE: 98 F | DIASTOLIC BLOOD PRESSURE: 73 MMHG | SYSTOLIC BLOOD PRESSURE: 130 MMHG

## 2021-11-12 DIAGNOSIS — R06.02 SOB (SHORTNESS OF BREATH): ICD-10-CM

## 2021-11-12 DIAGNOSIS — J40 BRONCHITIS: Primary | ICD-10-CM

## 2021-11-12 LAB
ALBUMIN SERPL BCP-MCNC: 3.6 G/DL (ref 3.5–5.2)
ALP SERPL-CCNC: 81 U/L (ref 55–135)
ALT SERPL W/O P-5'-P-CCNC: 52 U/L (ref 10–44)
ANION GAP SERPL CALC-SCNC: 12 MMOL/L (ref 8–16)
AST SERPL-CCNC: 43 U/L (ref 10–40)
BASOPHILS # BLD AUTO: 0.05 K/UL (ref 0–0.2)
BASOPHILS NFR BLD: 0.6 % (ref 0–1.9)
BILIRUB SERPL-MCNC: 0.6 MG/DL (ref 0.1–1)
BNP SERPL-MCNC: 1005 PG/ML (ref 0–99)
BUN SERPL-MCNC: 13 MG/DL (ref 8–23)
CALCIUM SERPL-MCNC: 9.5 MG/DL (ref 8.7–10.5)
CHLORIDE SERPL-SCNC: 110 MMOL/L (ref 95–110)
CK MB SERPL-MCNC: 1.3 NG/ML (ref 0.1–6.5)
CK MB SERPL-RTO: 1.6 % (ref 0–5)
CK SERPL-CCNC: 79 U/L (ref 20–180)
CO2 SERPL-SCNC: 22 MMOL/L (ref 23–29)
CREAT SERPL-MCNC: 0.8 MG/DL (ref 0.5–1.4)
DIFFERENTIAL METHOD: ABNORMAL
EOSINOPHIL # BLD AUTO: 0.2 K/UL (ref 0–0.5)
EOSINOPHIL NFR BLD: 1.7 % (ref 0–8)
ERYTHROCYTE [DISTWIDTH] IN BLOOD BY AUTOMATED COUNT: 13.2 % (ref 11.5–14.5)
EST. GFR  (AFRICAN AMERICAN): >60 ML/MIN/1.73 M^2
EST. GFR  (NON AFRICAN AMERICAN): >60 ML/MIN/1.73 M^2
GLUCOSE SERPL-MCNC: 155 MG/DL (ref 70–110)
HCT VFR BLD AUTO: 48.7 % (ref 37–48.5)
HGB BLD-MCNC: 16 G/DL (ref 12–16)
IMM GRANULOCYTES # BLD AUTO: 0.03 K/UL (ref 0–0.04)
IMM GRANULOCYTES NFR BLD AUTO: 0.3 % (ref 0–0.5)
INR PPP: 1 (ref 0.8–1.2)
LYMPHOCYTES # BLD AUTO: 2.9 K/UL (ref 1–4.8)
LYMPHOCYTES NFR BLD: 32.8 % (ref 18–48)
MCH RBC QN AUTO: 30.8 PG (ref 27–31)
MCHC RBC AUTO-ENTMCNC: 32.9 G/DL (ref 32–36)
MCV RBC AUTO: 94 FL (ref 82–98)
MONOCYTES # BLD AUTO: 0.4 K/UL (ref 0.3–1)
MONOCYTES NFR BLD: 5 % (ref 4–15)
NEUTROPHILS # BLD AUTO: 5.3 K/UL (ref 1.8–7.7)
NEUTROPHILS NFR BLD: 59.6 % (ref 38–73)
NRBC BLD-RTO: 0 /100 WBC
PLATELET # BLD AUTO: 261 K/UL (ref 150–450)
PMV BLD AUTO: 9.4 FL (ref 9.2–12.9)
POTASSIUM SERPL-SCNC: 4 MMOL/L (ref 3.5–5.1)
PROT SERPL-MCNC: 7.5 G/DL (ref 6–8.4)
PROTHROMBIN TIME: 10.5 SEC (ref 9–12.5)
RBC # BLD AUTO: 5.2 M/UL (ref 4–5.4)
SARS-COV-2 RDRP RESP QL NAA+PROBE: NEGATIVE
SODIUM SERPL-SCNC: 144 MMOL/L (ref 136–145)
TROPONIN I SERPL DL<=0.01 NG/ML-MCNC: <0.006 NG/ML (ref 0–0.03)
WBC # BLD AUTO: 8.82 K/UL (ref 3.9–12.7)

## 2021-11-12 PROCEDURE — 80053 COMPREHEN METABOLIC PANEL: CPT | Performed by: FAMILY MEDICINE

## 2021-11-12 PROCEDURE — 85610 PROTHROMBIN TIME: CPT | Performed by: FAMILY MEDICINE

## 2021-11-12 PROCEDURE — 83880 ASSAY OF NATRIURETIC PEPTIDE: CPT | Performed by: FAMILY MEDICINE

## 2021-11-12 PROCEDURE — 82553 CREATINE MB FRACTION: CPT | Performed by: FAMILY MEDICINE

## 2021-11-12 PROCEDURE — 94640 AIRWAY INHALATION TREATMENT: CPT

## 2021-11-12 PROCEDURE — 93010 ELECTROCARDIOGRAM REPORT: CPT | Mod: ,,, | Performed by: INTERNAL MEDICINE

## 2021-11-12 PROCEDURE — 85025 COMPLETE CBC W/AUTO DIFF WBC: CPT | Performed by: FAMILY MEDICINE

## 2021-11-12 PROCEDURE — 93010 EKG 12-LEAD: ICD-10-PCS | Mod: ,,, | Performed by: INTERNAL MEDICINE

## 2021-11-12 PROCEDURE — 71045 X-RAY EXAM CHEST 1 VIEW: CPT | Mod: 26,,, | Performed by: RADIOLOGY

## 2021-11-12 PROCEDURE — 99285 EMERGENCY DEPT VISIT HI MDM: CPT | Mod: 25

## 2021-11-12 PROCEDURE — 84484 ASSAY OF TROPONIN QUANT: CPT | Performed by: FAMILY MEDICINE

## 2021-11-12 PROCEDURE — 25000242 PHARM REV CODE 250 ALT 637 W/ HCPCS: Performed by: FAMILY MEDICINE

## 2021-11-12 PROCEDURE — 71045 X-RAY EXAM CHEST 1 VIEW: CPT | Mod: TC,FY

## 2021-11-12 PROCEDURE — U0002 COVID-19 LAB TEST NON-CDC: HCPCS | Performed by: FAMILY MEDICINE

## 2021-11-12 PROCEDURE — 71045 XR CHEST AP PORTABLE: ICD-10-PCS | Mod: 26,,, | Performed by: RADIOLOGY

## 2021-11-12 PROCEDURE — 93005 ELECTROCARDIOGRAM TRACING: CPT

## 2021-11-12 RX ORDER — ALBUTEROL SULFATE 90 UG/1
1-2 AEROSOL, METERED RESPIRATORY (INHALATION) EVERY 4 HOURS PRN
Qty: 18 G | Refills: 1 | Status: SHIPPED | OUTPATIENT
Start: 2021-11-12 | End: 2022-11-12

## 2021-11-12 RX ORDER — IPRATROPIUM BROMIDE AND ALBUTEROL SULFATE 2.5; .5 MG/3ML; MG/3ML
3 SOLUTION RESPIRATORY (INHALATION)
Status: COMPLETED | OUTPATIENT
Start: 2021-11-12 | End: 2021-11-12

## 2021-11-12 RX ORDER — BENZONATATE 100 MG/1
200 CAPSULE ORAL EVERY 8 HOURS PRN
Qty: 30 CAPSULE | Refills: 0 | Status: SHIPPED | OUTPATIENT
Start: 2021-11-12 | End: 2022-02-28

## 2021-11-12 RX ADMIN — IPRATROPIUM BROMIDE AND ALBUTEROL SULFATE 3 ML: .5; 3 SOLUTION RESPIRATORY (INHALATION) at 07:11

## 2022-01-02 ENCOUNTER — LAB VISIT (OUTPATIENT)
Dept: FAMILY MEDICINE | Facility: CLINIC | Age: 67
End: 2022-01-02
Payer: COMMERCIAL

## 2022-01-02 DIAGNOSIS — R51.9 HEAD ACHE: ICD-10-CM

## 2022-01-02 DIAGNOSIS — R68.83 CHILLS: ICD-10-CM

## 2022-01-02 DIAGNOSIS — R05.9 COUGH: ICD-10-CM

## 2022-01-02 PROCEDURE — U0003 INFECTIOUS AGENT DETECTION BY NUCLEIC ACID (DNA OR RNA); SEVERE ACUTE RESPIRATORY SYNDROME CORONAVIRUS 2 (SARS-COV-2) (CORONAVIRUS DISEASE [COVID-19]), AMPLIFIED PROBE TECHNIQUE, MAKING USE OF HIGH THROUGHPUT TECHNOLOGIES AS DESCRIBED BY CMS-2020-01-R: HCPCS | Performed by: FAMILY MEDICINE

## 2022-01-03 ENCOUNTER — PATIENT MESSAGE (OUTPATIENT)
Dept: ADMINISTRATIVE | Facility: OTHER | Age: 67
End: 2022-01-03
Payer: COMMERCIAL

## 2022-01-06 DIAGNOSIS — U07.1 COVID-19 VIRUS DETECTED: ICD-10-CM

## 2022-01-06 LAB
SARS-COV-2 RNA RESP QL NAA+PROBE: DETECTED
SARS-COV-2- CYCLE NUMBER: 20

## 2022-02-28 ENCOUNTER — OFFICE VISIT (OUTPATIENT)
Dept: PODIATRY | Facility: CLINIC | Age: 67
End: 2022-02-28
Payer: COMMERCIAL

## 2022-02-28 VITALS
HEART RATE: 56 BPM | BODY MASS INDEX: 33.65 KG/M2 | HEIGHT: 68 IN | TEMPERATURE: 97 F | DIASTOLIC BLOOD PRESSURE: 69 MMHG | WEIGHT: 222 LBS | SYSTOLIC BLOOD PRESSURE: 137 MMHG | RESPIRATION RATE: 18 BRPM

## 2022-02-28 DIAGNOSIS — M72.2 PLANTAR FASCIITIS: Primary | ICD-10-CM

## 2022-02-28 DIAGNOSIS — E11.9 TYPE 2 DIABETES MELLITUS WITHOUT COMPLICATION, WITHOUT LONG-TERM CURRENT USE OF INSULIN: ICD-10-CM

## 2022-02-28 DIAGNOSIS — E11.9 COMPREHENSIVE DIABETIC FOOT EXAMINATION, TYPE 2 DM, ENCOUNTER FOR: ICD-10-CM

## 2022-02-28 PROCEDURE — 3078F DIAST BP <80 MM HG: CPT | Mod: S$GLB,,, | Performed by: PODIATRIST

## 2022-02-28 PROCEDURE — 1160F RVW MEDS BY RX/DR IN RCRD: CPT | Mod: S$GLB,,, | Performed by: PODIATRIST

## 2022-02-28 PROCEDURE — 1159F MED LIST DOCD IN RCRD: CPT | Mod: S$GLB,,, | Performed by: PODIATRIST

## 2022-02-28 PROCEDURE — 99203 PR OFFICE/OUTPT VISIT, NEW, LEVL III, 30-44 MIN: ICD-10-PCS | Mod: S$GLB,,, | Performed by: PODIATRIST

## 2022-02-28 PROCEDURE — 1160F PR REVIEW ALL MEDS BY PRESCRIBER/CLIN PHARMACIST DOCUMENTED: ICD-10-PCS | Mod: S$GLB,,, | Performed by: PODIATRIST

## 2022-02-28 PROCEDURE — 99999 PR PBB SHADOW E&M-EST. PATIENT-LVL V: CPT | Mod: PBBFAC,,, | Performed by: PODIATRIST

## 2022-02-28 PROCEDURE — 1159F PR MEDICATION LIST DOCUMENTED IN MEDICAL RECORD: ICD-10-PCS | Mod: S$GLB,,, | Performed by: PODIATRIST

## 2022-02-28 PROCEDURE — 4010F PR ACE/ARB THEARPY RXD/TAKEN: ICD-10-PCS | Mod: S$GLB,,, | Performed by: PODIATRIST

## 2022-02-28 PROCEDURE — 99999 PR PBB SHADOW E&M-EST. PATIENT-LVL V: ICD-10-PCS | Mod: PBBFAC,,, | Performed by: PODIATRIST

## 2022-02-28 PROCEDURE — 3075F PR MOST RECENT SYSTOLIC BLOOD PRESS GE 130-139MM HG: ICD-10-PCS | Mod: S$GLB,,, | Performed by: PODIATRIST

## 2022-02-28 PROCEDURE — 3008F BODY MASS INDEX DOCD: CPT | Mod: S$GLB,,, | Performed by: PODIATRIST

## 2022-02-28 PROCEDURE — 3078F PR MOST RECENT DIASTOLIC BLOOD PRESSURE < 80 MM HG: ICD-10-PCS | Mod: S$GLB,,, | Performed by: PODIATRIST

## 2022-02-28 PROCEDURE — 4010F ACE/ARB THERAPY RXD/TAKEN: CPT | Mod: S$GLB,,, | Performed by: PODIATRIST

## 2022-02-28 PROCEDURE — 99203 OFFICE O/P NEW LOW 30 MIN: CPT | Mod: S$GLB,,, | Performed by: PODIATRIST

## 2022-02-28 PROCEDURE — 3075F SYST BP GE 130 - 139MM HG: CPT | Mod: S$GLB,,, | Performed by: PODIATRIST

## 2022-02-28 PROCEDURE — 3008F PR BODY MASS INDEX (BMI) DOCUMENTED: ICD-10-PCS | Mod: S$GLB,,, | Performed by: PODIATRIST

## 2022-02-28 RX ORDER — FUROSEMIDE 20 MG/1
20 TABLET ORAL
Status: ON HOLD | COMMUNITY
Start: 2021-10-07 | End: 2022-03-29 | Stop reason: HOSPADM

## 2022-02-28 RX ORDER — NITROGLYCERIN 0.4 MG/1
TABLET SUBLINGUAL
COMMUNITY
Start: 2022-02-08

## 2022-02-28 RX ORDER — EZETIMIBE 10 MG/1
10 TABLET ORAL DAILY
COMMUNITY
Start: 2022-01-26 | End: 2022-03-26 | Stop reason: CLARIF

## 2022-02-28 RX ORDER — ROSUVASTATIN CALCIUM 40 MG/1
40 TABLET, COATED ORAL DAILY
COMMUNITY
Start: 2022-01-10

## 2022-02-28 RX ORDER — METOPROLOL TARTRATE 25 MG/1
25 TABLET, FILM COATED ORAL
COMMUNITY
Start: 2022-01-07 | End: 2022-02-28 | Stop reason: SDUPTHER

## 2022-02-28 RX ORDER — BUPROPION HYDROCHLORIDE 150 MG/1
TABLET ORAL
COMMUNITY
Start: 2021-05-10

## 2022-02-28 RX ORDER — SACUBITRIL AND VALSARTAN 49; 51 MG/1; MG/1
1 TABLET, FILM COATED ORAL 2 TIMES DAILY
COMMUNITY
Start: 2022-02-17 | End: 2022-02-28 | Stop reason: SDUPTHER

## 2022-02-28 RX ORDER — AMIODARONE HYDROCHLORIDE 200 MG/1
200 TABLET ORAL
COMMUNITY
Start: 2021-09-03 | End: 2022-02-28 | Stop reason: SDUPTHER

## 2022-02-28 RX ORDER — DICLOFENAC SODIUM 75 MG/1
75 TABLET, DELAYED RELEASE ORAL 2 TIMES DAILY
Qty: 60 TABLET | Refills: 0 | Status: SHIPPED | OUTPATIENT
Start: 2022-02-28 | End: 2022-03-14

## 2022-02-28 RX ORDER — NITROGLYCERIN 0.4 MG/1
0.4 TABLET SUBLINGUAL
COMMUNITY
Start: 2022-02-08 | End: 2022-02-28 | Stop reason: SDUPTHER

## 2022-02-28 RX ORDER — SACUBITRIL AND VALSARTAN 49; 51 MG/1; MG/1
TABLET, FILM COATED ORAL
COMMUNITY
Start: 2021-12-06

## 2022-02-28 RX ORDER — GABAPENTIN 300 MG/1
600 CAPSULE ORAL
COMMUNITY
Start: 2021-10-07 | End: 2022-02-28 | Stop reason: SDUPTHER

## 2022-02-28 RX ORDER — LEVOTHYROXINE SODIUM 50 UG/1
TABLET ORAL
COMMUNITY
Start: 2021-10-27

## 2022-02-28 RX ORDER — EVOLOCUMAB 140 MG/ML
140 INJECTION, SOLUTION SUBCUTANEOUS
COMMUNITY
Start: 2021-11-23 | End: 2022-02-28

## 2022-02-28 RX ORDER — ROSUVASTATIN CALCIUM 40 MG/1
20 TABLET, COATED ORAL
COMMUNITY
Start: 2021-11-23 | End: 2022-02-28 | Stop reason: SDUPTHER

## 2022-02-28 RX ORDER — ONDANSETRON 8 MG/1
8 TABLET, ORALLY DISINTEGRATING ORAL
COMMUNITY
Start: 2021-10-07

## 2022-02-28 RX ORDER — CODEINE PHOSPHATE AND GUAIFENESIN 10; 100 MG/5ML; MG/5ML
5 SOLUTION ORAL EVERY 6 HOURS PRN
COMMUNITY
Start: 2021-11-24 | End: 2022-02-28

## 2022-02-28 RX ORDER — ONDANSETRON 8 MG/1
8 TABLET, ORALLY DISINTEGRATING ORAL 3 TIMES DAILY
COMMUNITY
Start: 2021-10-07 | End: 2022-02-28 | Stop reason: SDUPTHER

## 2022-02-28 RX ORDER — EZETIMIBE 10 MG/1
TABLET ORAL
COMMUNITY
Start: 2022-01-26

## 2022-03-02 NOTE — PROGRESS NOTES
Subjective:       Patient ID: Antonia Polk is a 66 y.o. female.    Chief Complaint: Foot Pain and Diabetes Mellitus  Patient presents today she is a diabetic times five years she is complaining of left heel pain that is been really bad for about 2 weeks she relates no change in activities no change in shoes she states it is especially painful when she 1st gets out of bed in the morning or after she has been sitting for short period of time.  Patient states she does have mild diabetic related neuropathy.    Past Medical History:   Diagnosis Date    Coronary artery disease     Diabetes mellitus     Hyperlipidemia     Hypertension     MI (myocardial infarction)     Neuropathy     Thyroid disease      Past Surgical History:   Procedure Laterality Date    A-V CARDIAC PACEMAKER INSERTION      HYSTERECTOMY      INSERTION OF PACEMAKER      REMOVAL OF IMPLANTED CARDIOVERTER-DEFIBRILLATOR (ICD)       History reviewed. No pertinent family history.  Social History     Socioeconomic History    Marital status:    Tobacco Use    Smoking status: Never Smoker    Smokeless tobacco: Never Used   Substance and Sexual Activity    Alcohol use: Yes     Comment: occ    Drug use: Never    Sexual activity: Yes     Birth control/protection: See Surgical Hx       Current Outpatient Medications   Medication Sig Dispense Refill    albuterol (PROVENTIL/VENTOLIN HFA) 90 mcg/actuation inhaler Inhale 1-2 puffs into the lungs every 4 (four) hours as needed for Wheezing or Shortness of Breath. Rescue 18 g 1    amiodarone (PACERONE) 200 MG Tab Take by mouth once daily.      aspirin (ECOTRIN) 81 MG EC tablet Take 81 mg by mouth once daily.      buPROPion (WELLBUTRIN XL) 150 MG TB24 tablet   1 tab, Oral, every 24 hours, # 90 tab, 3 Refill(s), Pharmacy: [id number], 173, cm, 03/16/21 13:29:00 CDT, Height/Length Measured, 98.4, kg, 01/26/21 14:36:00 CST, Weight Dosing      empagliflozin (JARDIANCE) 25 mg tablet Take  25 mg by mouth once daily.      ezetimibe (ZETIA) 10 mg tablet   See Instructions, Take 1 tablet by mouth once daily for 90 days, # 90 tab, 1 Refill(s), Pharmacy: [id number], 173, cm, 21 10:41:00 CST, Height/Length Measured, 104.5, kg, 21 10:41:00 CST, Weight Dosing      ezetimibe (ZETIA) 10 mg tablet Take 10 mg by mouth once daily.      furosemide (LASIX) 20 MG tablet 20 mg.      gabapentin (NEURONTIN) 300 MG capsule Take 300 mg by mouth once daily.      levothyroxine (SYNTHROID) 50 MCG tablet   = 1 tab, Oral, Daily, # 60 tab, 5 Refill(s), Pharmacy: [id number], 173, cm, 10/07/21 16:04:00 CDT, Height/Length Measured, 104.8, kg, 10/07/21 16:04:00 CDT, Weight Dosing      metoprolol tartrate (LOPRESSOR) 25 MG tablet Take 25 mg by mouth 2 (two) times daily.      ondansetron (ZOFRAN-ODT) 8 MG TbDL 8 mg.      rosuvastatin (CRESTOR) 40 MG Tab Take 40 mg by mouth once daily.      sacubitriL-valsartan (ENTRESTO) 49-51 mg per tablet   1 tab, Oral, BID, # 60 tab, 0 Refill(s), Pharmacy: [id number], 173, cm, 21 10:41:00 CST, Height/Length Measured, 104.5, kg, 21 10:41:00 CST, Weight Dosing      diclofenac (VOLTAREN) 75 MG EC tablet Take 1 tablet (75 mg total) by mouth 2 (two) times daily. 60 tablet 0    hyoscyamine (ANASPAZ,LEVSIN) 0.125 mg Tab Take 1 tablet (125 mcg total) by mouth every 4 (four) hours as needed (abdominal cramping). 30 tablet 0    nitroGLYCERIN (NITROSTAT) 0.4 MG SL tablet SMARTSI Tablet(s) Sublingual PRN       No current facility-administered medications for this visit.     Review of patient's allergies indicates:   Allergen Reactions    Demerol [meperidine]        Review of Systems   Musculoskeletal: Positive for arthralgias.   Neurological: Positive for numbness.   All other systems reviewed and are negative.      Objective:      Vitals:    22 1614   BP: 137/69   Pulse: (!) 56   Resp: 18   Temp: 97.3 °F (36.3 °C)   TempSrc: Oral  "  Weight: 100.7 kg (222 lb)   Height: 5' 8" (1.727 m)     Physical Exam  Vitals and nursing note reviewed.   Constitutional:       Appearance: Normal appearance.   Cardiovascular:      Pulses:           Dorsalis pedis pulses are 1+ on the right side and 1+ on the left side.        Posterior tibial pulses are 1+ on the right side and 1+ on the left side.   Pulmonary:      Effort: Pulmonary effort is normal.   Musculoskeletal:         General: Swelling and tenderness present.        Feet:    Feet:      Right foot:      Protective Sensation: 3 sites tested. 3 sites sensed.      Skin integrity: Skin integrity normal.      Left foot:      Protective Sensation: 3 sites tested. 3 sites sensed.      Skin integrity: Erythema and warmth present.   Skin:     General: Skin is warm.      Capillary Refill: Capillary refill takes 2 to 3 seconds.   Neurological:      General: No focal deficit present.      Mental Status: She is alert.   Psychiatric:         Mood and Affect: Mood normal.         Behavior: Behavior normal.         Thought Content: Thought content normal.         Judgment: Judgment normal.                                  Assessment:       1. Plantar fasciitis    2. Comprehensive diabetic foot examination, type 2 DM, encounter for    3. Type 2 diabetes mellitus without complication, without long-term current use of insulin        Plan:        Patient presents today she is a diabetic times five years she is complaining of left heel pain that is been really bad for about 2 weeks she relates no change in activities no change in shoes she states it is especially painful when she 1st gets out of bed in the morning or after she has been sitting for short period of time.  Patient states she does have mild diabetic related neuropathy.  A comprehensive new patient diabetic evaluation was performed today patient does have protective sensation intact bilateral on evaluation patient only relates mild numbness but not pain related " to her neuropathy.  Patient has elevated arches both weight-bearing and nonweightbearing bilateral she does have significant pain noted upon palpation of the plantar fascial ligament at the insertion left patient has associated inflammation additionally noted in this area.  Patient does state that her feet do feel better when she wears Crocs.  I did explain at length and in detail the patient plantar fasciitis and the treatment plan with appropriate support at all times of weight-bearing patient already know she cannot go barefoot she has never barefoot because she knows that it makes the situation worse.  Patient was dispensed blue arch pads for the left foot I put this in the patient's shoe I gave her additional pads she is to use these at all times of weight-bearing I have also started the patient on diclofenac and we are will re-evaluate the patient in 2-3 weeks I advised the patient we can look at something more aggressive regarding arch support depending upon how well she responds to and tolerates the blue pads dispensed today.  I did discuss diabetes and diabetic education with the patient advising her any cuts scrapes abrasions on her feet she should contact us immediately to avoid diabetic related complications.  Patient was reminded not to take the diclofenac on an empty stomach in the should only be taken after meals.  This note was created using [employer] voice recognition software that occasionally misinterpreted phrases or words.

## 2022-03-14 ENCOUNTER — OFFICE VISIT (OUTPATIENT)
Dept: PODIATRY | Facility: CLINIC | Age: 67
End: 2022-03-14
Payer: COMMERCIAL

## 2022-03-14 VITALS
HEIGHT: 68 IN | TEMPERATURE: 98 F | DIASTOLIC BLOOD PRESSURE: 68 MMHG | SYSTOLIC BLOOD PRESSURE: 142 MMHG | HEART RATE: 57 BPM | BODY MASS INDEX: 33.65 KG/M2 | WEIGHT: 222 LBS

## 2022-03-14 DIAGNOSIS — E11.9 TYPE 2 DIABETES MELLITUS WITHOUT COMPLICATION, WITHOUT LONG-TERM CURRENT USE OF INSULIN: ICD-10-CM

## 2022-03-14 DIAGNOSIS — E11.9 COMPREHENSIVE DIABETIC FOOT EXAMINATION, TYPE 2 DM, ENCOUNTER FOR: ICD-10-CM

## 2022-03-14 DIAGNOSIS — M72.2 PLANTAR FASCIITIS: Primary | ICD-10-CM

## 2022-03-14 PROCEDURE — 99214 OFFICE O/P EST MOD 30 MIN: CPT | Mod: S$GLB,,, | Performed by: PODIATRIST

## 2022-03-14 PROCEDURE — 4010F ACE/ARB THERAPY RXD/TAKEN: CPT | Mod: S$GLB,,, | Performed by: PODIATRIST

## 2022-03-14 PROCEDURE — 1160F RVW MEDS BY RX/DR IN RCRD: CPT | Mod: S$GLB,,, | Performed by: PODIATRIST

## 2022-03-14 PROCEDURE — 3008F BODY MASS INDEX DOCD: CPT | Mod: S$GLB,,, | Performed by: PODIATRIST

## 2022-03-14 PROCEDURE — 99214 PR OFFICE/OUTPT VISIT, EST, LEVL IV, 30-39 MIN: ICD-10-PCS | Mod: S$GLB,,, | Performed by: PODIATRIST

## 2022-03-14 PROCEDURE — 3008F PR BODY MASS INDEX (BMI) DOCUMENTED: ICD-10-PCS | Mod: S$GLB,,, | Performed by: PODIATRIST

## 2022-03-14 PROCEDURE — 1159F PR MEDICATION LIST DOCUMENTED IN MEDICAL RECORD: ICD-10-PCS | Mod: S$GLB,,, | Performed by: PODIATRIST

## 2022-03-14 PROCEDURE — 4010F PR ACE/ARB THEARPY RXD/TAKEN: ICD-10-PCS | Mod: S$GLB,,, | Performed by: PODIATRIST

## 2022-03-14 PROCEDURE — 1160F PR REVIEW ALL MEDS BY PRESCRIBER/CLIN PHARMACIST DOCUMENTED: ICD-10-PCS | Mod: S$GLB,,, | Performed by: PODIATRIST

## 2022-03-14 PROCEDURE — 3078F DIAST BP <80 MM HG: CPT | Mod: S$GLB,,, | Performed by: PODIATRIST

## 2022-03-14 PROCEDURE — 3077F PR MOST RECENT SYSTOLIC BLOOD PRESSURE >= 140 MM HG: ICD-10-PCS | Mod: S$GLB,,, | Performed by: PODIATRIST

## 2022-03-14 PROCEDURE — 99999 PR PBB SHADOW E&M-EST. PATIENT-LVL IV: CPT | Mod: PBBFAC,,, | Performed by: PODIATRIST

## 2022-03-14 PROCEDURE — 99999 PR PBB SHADOW E&M-EST. PATIENT-LVL IV: ICD-10-PCS | Mod: PBBFAC,,, | Performed by: PODIATRIST

## 2022-03-14 PROCEDURE — 3078F PR MOST RECENT DIASTOLIC BLOOD PRESSURE < 80 MM HG: ICD-10-PCS | Mod: S$GLB,,, | Performed by: PODIATRIST

## 2022-03-14 PROCEDURE — 3077F SYST BP >= 140 MM HG: CPT | Mod: S$GLB,,, | Performed by: PODIATRIST

## 2022-03-14 PROCEDURE — 1159F MED LIST DOCD IN RCRD: CPT | Mod: S$GLB,,, | Performed by: PODIATRIST

## 2022-03-14 RX ORDER — MELOXICAM 15 MG/1
15 TABLET ORAL DAILY
Qty: 14 TABLET | Refills: 0 | Status: ON HOLD | OUTPATIENT
Start: 2022-03-14 | End: 2022-03-29 | Stop reason: HOSPADM

## 2022-03-14 RX ORDER — OMEPRAZOLE 40 MG/1
40 CAPSULE, DELAYED RELEASE ORAL DAILY
Status: ON HOLD | COMMUNITY
Start: 2022-03-04 | End: 2022-03-26 | Stop reason: ALTCHOICE

## 2022-03-25 NOTE — PROGRESS NOTES
Subjective:       Patient ID: Antonia Polk is a 66 y.o. female.    Chief Complaint: Follow-up, Heel Pain, Diabetes Mellitus, and Foot Pain  Patient presents for follow-up plantar fasciitis and diabetic evaluation.    Past Medical History:   Diagnosis Date    Coronary artery disease     Diabetes mellitus     Hyperlipidemia     Hypertension     MI (myocardial infarction)     Neuropathy     Thyroid disease      Past Surgical History:   Procedure Laterality Date    A-V CARDIAC PACEMAKER INSERTION      HYSTERECTOMY      INSERTION OF PACEMAKER      REMOVAL OF IMPLANTED CARDIOVERTER-DEFIBRILLATOR (ICD)       History reviewed. No pertinent family history.  Social History     Socioeconomic History    Marital status:    Tobacco Use    Smoking status: Never Smoker    Smokeless tobacco: Never Used   Substance and Sexual Activity    Alcohol use: Yes     Comment: occ    Drug use: Never    Sexual activity: Yes     Birth control/protection: See Surgical Hx       Current Outpatient Medications   Medication Sig Dispense Refill    albuterol (PROVENTIL/VENTOLIN HFA) 90 mcg/actuation inhaler Inhale 1-2 puffs into the lungs every 4 (four) hours as needed for Wheezing or Shortness of Breath. Rescue 18 g 1    amiodarone (PACERONE) 200 MG Tab Take by mouth once daily.      aspirin (ECOTRIN) 81 MG EC tablet Take 81 mg by mouth once daily.      buPROPion (WELLBUTRIN XL) 150 MG TB24 tablet   1 tab, Oral, every 24 hours, # 90 tab, 3 Refill(s), Pharmacy: [id number], 173, cm, 03/16/21 13:29:00 CDT, Height/Length Measured, 98.4, kg, 01/26/21 14:36:00 CST, Weight Dosing      empagliflozin (JARDIANCE) 25 mg tablet Take 25 mg by mouth once daily.      ezetimibe (ZETIA) 10 mg tablet   See Instructions, Take 1 tablet by mouth once daily for 90 days, # 90 tab, 1 Refill(s), Pharmacy: [id number], 173, cm, 11/23/21 10:41:00 CST, Height/Length Measured, 104.5, kg, 11/23/21 10:41:00 CST, Weight Dosing    "   ezetimibe (ZETIA) 10 mg tablet Take 10 mg by mouth once daily.      furosemide (LASIX) 20 MG tablet 20 mg.      gabapentin (NEURONTIN) 300 MG capsule Take 300 mg by mouth once daily.      hyoscyamine (ANASPAZ,LEVSIN) 0.125 mg Tab Take 1 tablet (125 mcg total) by mouth every 4 (four) hours as needed (abdominal cramping). 30 tablet 0    levothyroxine (SYNTHROID) 50 MCG tablet   = 1 tab, Oral, Daily, # 60 tab, 5 Refill(s), Pharmacy: [id number], 173, cm, 10/07/21 16:04:00 CDT, Height/Length Measured, 104.8, kg, 10/07/21 16:04:00 CDT, Weight Dosing      meloxicam (MOBIC) 15 MG tablet Take 1 tablet (15 mg total) by mouth once daily. for 14 days 14 tablet 0    metoprolol tartrate (LOPRESSOR) 25 MG tablet Take 25 mg by mouth 2 (two) times daily.      nitroGLYCERIN (NITROSTAT) 0.4 MG SL tablet SMARTSI Tablet(s) Sublingual PRN      omeprazole (PRILOSEC) 40 MG capsule Take 40 mg by mouth once daily.      ondansetron (ZOFRAN-ODT) 8 MG TbDL 8 mg.      rosuvastatin (CRESTOR) 40 MG Tab Take 40 mg by mouth once daily.      sacubitriL-valsartan (ENTRESTO) 49-51 mg per tablet   1 tab, Oral, BID, # 60 tab, 0 Refill(s), Pharmacy: [id number], 173, cm, 21 10:41:00 CST, Height/Length Measured, 104.5, kg, 21 10:41:00 CST, Weight Dosing       No current facility-administered medications for this visit.     Review of patient's allergies indicates:   Allergen Reactions    Demerol [meperidine]     Diclofenac      Itching., GI issues.        Review of Systems   Musculoskeletal: Positive for arthralgias.   Neurological: Positive for numbness.   All other systems reviewed and are negative.      Objective:      Vitals:    22 1600   BP: (!) 142/68   Pulse: (!) 57   Temp: 98 °F (36.7 °C)   Weight: 100.7 kg (222 lb)   Height: 5' 8" (1.727 m)     Physical Exam  Vitals and nursing note reviewed.   Constitutional:       Appearance: Normal appearance.   Cardiovascular:      Pulses:           " Dorsalis pedis pulses are 1+ on the right side and 1+ on the left side.        Posterior tibial pulses are 1+ on the right side and 1+ on the left side.   Pulmonary:      Effort: Pulmonary effort is normal.   Musculoskeletal:         General: Swelling and tenderness present.        Feet:    Feet:      Right foot:      Protective Sensation: 3 sites tested. 3 sites sensed.      Skin integrity: Skin integrity normal.      Left foot:      Protective Sensation: 3 sites tested. 3 sites sensed.      Skin integrity: Erythema and warmth present.   Skin:     General: Skin is warm.      Capillary Refill: Capillary refill takes 2 to 3 seconds.   Neurological:      General: No focal deficit present.      Mental Status: She is alert.   Psychiatric:         Mood and Affect: Mood normal.         Behavior: Behavior normal.         Thought Content: Thought content normal.         Judgment: Judgment normal.          Assessment:       1. Plantar fasciitis    2. Comprehensive diabetic foot examination, type 2 DM, encounter for    3. Type 2 diabetes mellitus without complication, without long-term current use of insulin        Plan:        Patient presents today follow-up plantar fasciitis and diabetic evaluation.  Patient states she is doing better she states she is not having near as much discomfort in her left foot and heel area she states that she overall she is about 50% better use a lot utilizing the blue arch pads that I dispensed to her.  Patient states she did not tolerate the diclofenac she states that it caused her to itch she states she only took it for 2 days and states she had to stop taking it because of the itching but she states it did definitely help.  On evaluation I advised the patient clearly she needs more support than what she is currently getting she is 50% better with the blue arch pads but I do feel that she needs something more aggressive I dispensed the patient some power step full length arch supports I have  added additional blue arch padding to the plantar arch of the left advised the patient this should give her even more support and eliminate her discomfort over the next several weeks.  Patient states she does do a lot of walking every day and by the end of the day her foot is pretty bad even though she has improved.  Patient is going to wear the power steps at all times I have advised her we can always do something more aggressive if need be I will plan on seeing her for follow-up in 2-3 weeks if she is not doing better if she is completely pain free then I will see her as needed for follow-up certainly if she has any problems questions or concerns she should contact us immediately.  This note was created using [email or web address] voice recognition software that occasionally misinterpreted phrases or words.

## 2022-03-26 ENCOUNTER — HOSPITAL ENCOUNTER (OUTPATIENT)
Facility: HOSPITAL | Age: 67
LOS: 1 days | Discharge: HOME | End: 2022-03-27
Attending: EMERGENCY MEDICINE | Admitting: FAMILY MEDICINE
Payer: COMMERCIAL

## 2022-03-26 ENCOUNTER — HOSPITAL ENCOUNTER (EMERGENCY)
Facility: HOSPITAL | Age: 67
Discharge: HOME | End: 2022-03-26
Attending: EMERGENCY MEDICINE
Payer: COMMERCIAL

## 2022-03-26 VITALS
HEIGHT: 68 IN | TEMPERATURE: 98 F | SYSTOLIC BLOOD PRESSURE: 153 MMHG | DIASTOLIC BLOOD PRESSURE: 88 MMHG | WEIGHT: 225 LBS | RESPIRATION RATE: 17 BRPM | OXYGEN SATURATION: 92 % | HEART RATE: 53 BPM | BODY MASS INDEX: 34.1 KG/M2

## 2022-03-26 DIAGNOSIS — I50.43 ACUTE ON CHRONIC COMBINED SYSTOLIC AND DIASTOLIC CONGESTIVE HEART FAILURE: ICD-10-CM

## 2022-03-26 DIAGNOSIS — R06.02 SHORTNESS OF BREATH: ICD-10-CM

## 2022-03-26 DIAGNOSIS — I50.9 CONGESTIVE HEART FAILURE, UNSPECIFIED HF CHRONICITY, UNSPECIFIED HEART FAILURE TYPE: Primary | ICD-10-CM

## 2022-03-26 DIAGNOSIS — R06.02 SOB (SHORTNESS OF BREATH): ICD-10-CM

## 2022-03-26 DIAGNOSIS — R07.9 CHEST PAIN: ICD-10-CM

## 2022-03-26 DIAGNOSIS — I50.9 ACUTE EXACERBATION OF CHF (CONGESTIVE HEART FAILURE): ICD-10-CM

## 2022-03-26 PROBLEM — E03.9 HYPOTHYROID: Status: ACTIVE | Noted: 2022-03-26

## 2022-03-26 PROBLEM — I25.10 CORONARY ARTERY DISEASE INVOLVING NATIVE CORONARY ARTERY OF NATIVE HEART WITHOUT ANGINA PECTORIS: Status: ACTIVE | Noted: 2022-03-26

## 2022-03-26 LAB
ALBUMIN SERPL BCP-MCNC: 3 G/DL (ref 3.5–5.2)
ALP SERPL-CCNC: 70 U/L (ref 55–135)
ALT SERPL W/O P-5'-P-CCNC: 23 U/L (ref 10–44)
ANION GAP SERPL CALC-SCNC: 10 MMOL/L (ref 8–16)
AST SERPL-CCNC: 23 U/L (ref 10–40)
BASOPHILS # BLD AUTO: 0.02 K/UL (ref 0–0.2)
BASOPHILS NFR BLD: 0.4 % (ref 0–1.9)
BILIRUB SERPL-MCNC: 0.8 MG/DL (ref 0.1–1)
BILIRUB UR QL STRIP: NEGATIVE
BNP SERPL-MCNC: 846 PG/ML (ref 0–99)
BUN SERPL-MCNC: 16 MG/DL (ref 8–23)
CALCIUM SERPL-MCNC: 8.7 MG/DL (ref 8.7–10.5)
CHLORIDE SERPL-SCNC: 110 MMOL/L (ref 95–110)
CLARITY UR: CLEAR
CO2 SERPL-SCNC: 22 MMOL/L (ref 23–29)
COLOR UR: YELLOW
CREAT SERPL-MCNC: 0.7 MG/DL (ref 0.5–1.4)
DIFFERENTIAL METHOD: ABNORMAL
EOSINOPHIL # BLD AUTO: 0.1 K/UL (ref 0–0.5)
EOSINOPHIL NFR BLD: 3 % (ref 0–8)
ERYTHROCYTE [DISTWIDTH] IN BLOOD BY AUTOMATED COUNT: 13.1 % (ref 11.5–14.5)
EST. GFR  (AFRICAN AMERICAN): >60 ML/MIN/1.73 M^2
EST. GFR  (NON AFRICAN AMERICAN): >60 ML/MIN/1.73 M^2
GLUCOSE SERPL-MCNC: 210 MG/DL (ref 70–110)
GLUCOSE UR QL STRIP: ABNORMAL
HCT VFR BLD AUTO: 41.2 % (ref 37–48.5)
HGB BLD-MCNC: 13.3 G/DL (ref 12–16)
HGB UR QL STRIP: NEGATIVE
IMM GRANULOCYTES # BLD AUTO: 0.05 K/UL (ref 0–0.04)
IMM GRANULOCYTES NFR BLD AUTO: 1.1 % (ref 0–0.5)
KETONES UR QL STRIP: NEGATIVE
LEUKOCYTE ESTERASE UR QL STRIP: NEGATIVE
LYMPHOCYTES # BLD AUTO: 1.1 K/UL (ref 1–4.8)
LYMPHOCYTES NFR BLD: 23.1 % (ref 18–48)
MCH RBC QN AUTO: 31 PG (ref 27–31)
MCHC RBC AUTO-ENTMCNC: 32.3 G/DL (ref 32–36)
MCV RBC AUTO: 96 FL (ref 82–98)
MONOCYTES # BLD AUTO: 0.4 K/UL (ref 0.3–1)
MONOCYTES NFR BLD: 7.6 % (ref 4–15)
NEUTROPHILS # BLD AUTO: 3.1 K/UL (ref 1.8–7.7)
NEUTROPHILS NFR BLD: 64.8 % (ref 38–73)
NITRITE UR QL STRIP: NEGATIVE
NRBC BLD-RTO: 0 /100 WBC
PH UR STRIP: 6 [PH] (ref 5–8)
PLATELET # BLD AUTO: 181 K/UL (ref 150–450)
PMV BLD AUTO: 9.8 FL (ref 9.2–12.9)
POTASSIUM SERPL-SCNC: 3.7 MMOL/L (ref 3.5–5.1)
PROT SERPL-MCNC: 6.5 G/DL (ref 6–8.4)
PROT UR QL STRIP: NEGATIVE
RBC # BLD AUTO: 4.29 M/UL (ref 4–5.4)
SARS-COV-2 RDRP RESP QL NAA+PROBE: NEGATIVE
SODIUM SERPL-SCNC: 142 MMOL/L (ref 136–145)
SP GR UR STRIP: 1.01 (ref 1–1.03)
TROPONIN I SERPL DL<=0.01 NG/ML-MCNC: <0.006 NG/ML (ref 0–0.03)
URN SPEC COLLECT METH UR: ABNORMAL
UROBILINOGEN UR STRIP-ACNC: NEGATIVE EU/DL
WBC # BLD AUTO: 4.72 K/UL (ref 3.9–12.7)

## 2022-03-26 PROCEDURE — 25000003 PHARM REV CODE 250: Performed by: FAMILY MEDICINE

## 2022-03-26 PROCEDURE — 96374 THER/PROPH/DIAG INJ IV PUSH: CPT

## 2022-03-26 PROCEDURE — 93010 ELECTROCARDIOGRAM REPORT: CPT | Mod: ,,, | Performed by: INTERNAL MEDICINE

## 2022-03-26 PROCEDURE — 84484 ASSAY OF TROPONIN QUANT: CPT | Performed by: EMERGENCY MEDICINE

## 2022-03-26 PROCEDURE — 96372 THER/PROPH/DIAG INJ SC/IM: CPT | Performed by: FAMILY MEDICINE

## 2022-03-26 PROCEDURE — 63600175 PHARM REV CODE 636 W HCPCS: Performed by: FAMILY MEDICINE

## 2022-03-26 PROCEDURE — 83880 ASSAY OF NATRIURETIC PEPTIDE: CPT | Performed by: EMERGENCY MEDICINE

## 2022-03-26 PROCEDURE — 93010 EKG 12-LEAD: ICD-10-PCS | Mod: ,,, | Performed by: INTERNAL MEDICINE

## 2022-03-26 PROCEDURE — 71045 XR CHEST AP PORTABLE: ICD-10-PCS | Mod: 26,,, | Performed by: RADIOLOGY

## 2022-03-26 PROCEDURE — 80053 COMPREHEN METABOLIC PANEL: CPT | Performed by: EMERGENCY MEDICINE

## 2022-03-26 PROCEDURE — 94761 N-INVAS EAR/PLS OXIMETRY MLT: CPT

## 2022-03-26 PROCEDURE — 81003 URINALYSIS AUTO W/O SCOPE: CPT | Performed by: EMERGENCY MEDICINE

## 2022-03-26 PROCEDURE — 99220 PR INITIAL OBSERVATION CARE,LEVL III: CPT | Mod: ,,, | Performed by: FAMILY MEDICINE

## 2022-03-26 PROCEDURE — 99285 EMERGENCY DEPT VISIT HI MDM: CPT | Mod: 25

## 2022-03-26 PROCEDURE — 71045 X-RAY EXAM CHEST 1 VIEW: CPT | Mod: 26,,, | Performed by: RADIOLOGY

## 2022-03-26 PROCEDURE — G0378 HOSPITAL OBSERVATION PER HR: HCPCS

## 2022-03-26 PROCEDURE — 99220 PR INITIAL OBSERVATION CARE,LEVL III: ICD-10-PCS | Mod: ,,, | Performed by: FAMILY MEDICINE

## 2022-03-26 PROCEDURE — 99285 EMERGENCY DEPT VISIT HI MDM: CPT | Mod: 25,27

## 2022-03-26 PROCEDURE — 93005 ELECTROCARDIOGRAM TRACING: CPT

## 2022-03-26 PROCEDURE — 85025 COMPLETE CBC W/AUTO DIFF WBC: CPT | Performed by: EMERGENCY MEDICINE

## 2022-03-26 PROCEDURE — 63600175 PHARM REV CODE 636 W HCPCS: Performed by: EMERGENCY MEDICINE

## 2022-03-26 PROCEDURE — U0002 COVID-19 LAB TEST NON-CDC: HCPCS | Performed by: EMERGENCY MEDICINE

## 2022-03-26 PROCEDURE — 25000003 PHARM REV CODE 250: Performed by: HOSPITALIST

## 2022-03-26 PROCEDURE — 71045 X-RAY EXAM CHEST 1 VIEW: CPT | Mod: TC,FY

## 2022-03-26 RX ORDER — PROMETHAZINE HYDROCHLORIDE 12.5 MG/1
25 TABLET ORAL EVERY 6 HOURS PRN
Status: DISCONTINUED | OUTPATIENT
Start: 2022-03-26 | End: 2022-03-27 | Stop reason: HOSPADM

## 2022-03-26 RX ORDER — SODIUM,POTASSIUM PHOSPHATES 280-250MG
2 POWDER IN PACKET (EA) ORAL
Status: DISCONTINUED | OUTPATIENT
Start: 2022-03-26 | End: 2022-03-27 | Stop reason: HOSPADM

## 2022-03-26 RX ORDER — SUCRALFATE 1 G/10ML
1 SUSPENSION ORAL EVERY 6 HOURS
Status: DISCONTINUED | OUTPATIENT
Start: 2022-03-27 | End: 2022-03-27 | Stop reason: HOSPADM

## 2022-03-26 RX ORDER — SODIUM CHLORIDE 0.9 % (FLUSH) 0.9 %
10 SYRINGE (ML) INJECTION EVERY 8 HOURS PRN
Status: DISCONTINUED | OUTPATIENT
Start: 2022-03-26 | End: 2022-03-27 | Stop reason: HOSPADM

## 2022-03-26 RX ORDER — MORPHINE SULFATE 4 MG/ML
2 INJECTION, SOLUTION INTRAMUSCULAR; INTRAVENOUS EVERY 4 HOURS PRN
Status: DISCONTINUED | OUTPATIENT
Start: 2022-03-26 | End: 2022-03-27 | Stop reason: HOSPADM

## 2022-03-26 RX ORDER — POTASSIUM CHLORIDE 20 MEQ/1
20 TABLET, EXTENDED RELEASE ORAL DAILY
Qty: 5 TABLET | Refills: 0 | Status: SHIPPED | OUTPATIENT
Start: 2022-03-26 | End: 2022-03-31

## 2022-03-26 RX ORDER — METOPROLOL TARTRATE 25 MG/1
25 TABLET, FILM COATED ORAL 2 TIMES DAILY
Status: DISCONTINUED | OUTPATIENT
Start: 2022-03-26 | End: 2022-03-27 | Stop reason: HOSPADM

## 2022-03-26 RX ORDER — IBUPROFEN 200 MG
24 TABLET ORAL
Status: DISCONTINUED | OUTPATIENT
Start: 2022-03-26 | End: 2022-03-27 | Stop reason: HOSPADM

## 2022-03-26 RX ORDER — ATORVASTATIN CALCIUM 40 MG/1
80 TABLET, FILM COATED ORAL NIGHTLY
Status: DISCONTINUED | OUTPATIENT
Start: 2022-03-26 | End: 2022-03-27 | Stop reason: HOSPADM

## 2022-03-26 RX ORDER — GABAPENTIN 300 MG/1
300 CAPSULE ORAL NIGHTLY
Status: DISCONTINUED | OUTPATIENT
Start: 2022-03-26 | End: 2022-03-27 | Stop reason: HOSPADM

## 2022-03-26 RX ORDER — IPRATROPIUM BROMIDE AND ALBUTEROL SULFATE 2.5; .5 MG/3ML; MG/3ML
3 SOLUTION RESPIRATORY (INHALATION) EVERY 6 HOURS PRN
Status: DISCONTINUED | OUTPATIENT
Start: 2022-03-26 | End: 2022-03-27 | Stop reason: HOSPADM

## 2022-03-26 RX ORDER — PANTOPRAZOLE SODIUM 40 MG/1
40 TABLET, DELAYED RELEASE ORAL DAILY
Status: DISCONTINUED | OUTPATIENT
Start: 2022-03-27 | End: 2022-03-27 | Stop reason: HOSPADM

## 2022-03-26 RX ORDER — FUROSEMIDE 10 MG/ML
40 INJECTION INTRAMUSCULAR; INTRAVENOUS DAILY
Status: DISCONTINUED | OUTPATIENT
Start: 2022-03-27 | End: 2022-03-27 | Stop reason: HOSPADM

## 2022-03-26 RX ORDER — LANOLIN ALCOHOL/MO/W.PET/CERES
800 CREAM (GRAM) TOPICAL
Status: DISCONTINUED | OUTPATIENT
Start: 2022-03-26 | End: 2022-03-27 | Stop reason: HOSPADM

## 2022-03-26 RX ORDER — HYDROCODONE BITARTRATE AND ACETAMINOPHEN 10; 325 MG/1; MG/1
1 TABLET ORAL EVERY 6 HOURS PRN
Status: DISCONTINUED | OUTPATIENT
Start: 2022-03-26 | End: 2022-03-27 | Stop reason: HOSPADM

## 2022-03-26 RX ORDER — ENOXAPARIN SODIUM 100 MG/ML
40 INJECTION SUBCUTANEOUS EVERY 24 HOURS
Status: DISCONTINUED | OUTPATIENT
Start: 2022-03-26 | End: 2022-03-27 | Stop reason: HOSPADM

## 2022-03-26 RX ORDER — ASPIRIN 81 MG/1
81 TABLET ORAL NIGHTLY
Status: DISCONTINUED | OUTPATIENT
Start: 2022-03-26 | End: 2022-03-27 | Stop reason: HOSPADM

## 2022-03-26 RX ORDER — ASPIRIN 81 MG/1
81 TABLET ORAL DAILY
Status: DISCONTINUED | OUTPATIENT
Start: 2022-03-27 | End: 2022-03-26

## 2022-03-26 RX ORDER — NITROGLYCERIN 0.4 MG/1
0.4 TABLET SUBLINGUAL EVERY 5 MIN PRN
Status: DISCONTINUED | OUTPATIENT
Start: 2022-03-26 | End: 2022-03-27 | Stop reason: HOSPADM

## 2022-03-26 RX ORDER — ATORVASTATIN CALCIUM 40 MG/1
80 TABLET, FILM COATED ORAL DAILY
Status: DISCONTINUED | OUTPATIENT
Start: 2022-03-27 | End: 2022-03-26

## 2022-03-26 RX ORDER — LEVOTHYROXINE SODIUM 25 UG/1
50 TABLET ORAL
Status: DISCONTINUED | OUTPATIENT
Start: 2022-03-27 | End: 2022-03-27 | Stop reason: HOSPADM

## 2022-03-26 RX ORDER — GABAPENTIN 300 MG/1
300 CAPSULE ORAL DAILY
Status: DISCONTINUED | OUTPATIENT
Start: 2022-03-27 | End: 2022-03-26

## 2022-03-26 RX ORDER — MAG HYDROX/ALUMINUM HYD/SIMETH 200-200-20
30 SUSPENSION, ORAL (FINAL DOSE FORM) ORAL
Status: DISCONTINUED | OUTPATIENT
Start: 2022-03-27 | End: 2022-03-27 | Stop reason: HOSPADM

## 2022-03-26 RX ORDER — ACETAMINOPHEN 325 MG/1
650 TABLET ORAL EVERY 4 HOURS PRN
Status: DISCONTINUED | OUTPATIENT
Start: 2022-03-26 | End: 2022-03-27 | Stop reason: HOSPADM

## 2022-03-26 RX ORDER — FUROSEMIDE 20 MG/1
20 TABLET ORAL 2 TIMES DAILY
Qty: 10 TABLET | Refills: 0 | Status: SHIPPED | OUTPATIENT
Start: 2022-03-26 | End: 2022-03-31

## 2022-03-26 RX ORDER — ONDANSETRON 2 MG/ML
4 INJECTION INTRAMUSCULAR; INTRAVENOUS EVERY 8 HOURS PRN
Status: DISCONTINUED | OUTPATIENT
Start: 2022-03-26 | End: 2022-03-27 | Stop reason: HOSPADM

## 2022-03-26 RX ORDER — GLUCAGON 1 MG
1 KIT INJECTION
Status: DISCONTINUED | OUTPATIENT
Start: 2022-03-26 | End: 2022-03-27 | Stop reason: HOSPADM

## 2022-03-26 RX ORDER — POLYETHYLENE GLYCOL 3350 17 G/17G
17 POWDER, FOR SOLUTION ORAL DAILY PRN
Status: DISCONTINUED | OUTPATIENT
Start: 2022-03-26 | End: 2022-03-27 | Stop reason: HOSPADM

## 2022-03-26 RX ORDER — FUROSEMIDE 10 MG/ML
20 INJECTION INTRAMUSCULAR; INTRAVENOUS
Status: COMPLETED | OUTPATIENT
Start: 2022-03-26 | End: 2022-03-26

## 2022-03-26 RX ORDER — IBUPROFEN 200 MG
16 TABLET ORAL
Status: DISCONTINUED | OUTPATIENT
Start: 2022-03-26 | End: 2022-03-27 | Stop reason: HOSPADM

## 2022-03-26 RX ORDER — NALOXONE HCL 0.4 MG/ML
0.02 VIAL (ML) INJECTION
Status: DISCONTINUED | OUTPATIENT
Start: 2022-03-26 | End: 2022-03-27 | Stop reason: HOSPADM

## 2022-03-26 RX ORDER — AMIODARONE HYDROCHLORIDE 100 MG/1
200 TABLET ORAL DAILY
Status: DISCONTINUED | OUTPATIENT
Start: 2022-03-27 | End: 2022-03-27 | Stop reason: HOSPADM

## 2022-03-26 RX ORDER — BUPROPION HYDROCHLORIDE 150 MG/1
150 TABLET, EXTENDED RELEASE ORAL 2 TIMES DAILY
Status: DISCONTINUED | OUTPATIENT
Start: 2022-03-26 | End: 2022-03-27 | Stop reason: HOSPADM

## 2022-03-26 RX ADMIN — FUROSEMIDE 20 MG: 10 INJECTION, SOLUTION INTRAMUSCULAR; INTRAVENOUS at 05:03

## 2022-03-26 RX ADMIN — GABAPENTIN 300 MG: 300 CAPSULE ORAL at 10:03

## 2022-03-26 RX ADMIN — ENOXAPARIN SODIUM 40 MG: 40 INJECTION SUBCUTANEOUS at 11:03

## 2022-03-26 RX ADMIN — ATORVASTATIN CALCIUM 80 MG: 40 TABLET, FILM COATED ORAL at 10:03

## 2022-03-26 RX ADMIN — METOPROLOL TARTRATE 25 MG: 25 TABLET, FILM COATED ORAL at 08:03

## 2022-03-26 RX ADMIN — ASPIRIN 81 MG: 81 TABLET, DELAYED RELEASE ORAL at 10:03

## 2022-03-26 RX ADMIN — SUCRALFATE 1 G: 1 SUSPENSION ORAL at 11:03

## 2022-03-26 NOTE — ASSESSMENT & PLAN NOTE
Patient is identified as having Combined Systolic and Diastolic heart failure that is Acute on chronic. CHF is currently uncontrolled due to Continued edema of extremities, Dyspnea not returned to baseline after 1 doses of IV diuretic, Rales/crackles on pulmonary exam and Pulmonary edema/pleural effusion on CXR. Latest ECHO performed in 06/2021 - reported to have EF 40%.   Has CAD with AICD, h/o multiple stents and h/o of MI in 2005    Continue Beta Blocker and Furosemide and monitor clinical status closely. Monitor on telemetry.Monitor strict Is&Os and daily weights.  Place on fluid restriction of 1.5 L. Continue to stress to patient importance of self efficacy and  on diet for CHF. Last BNP reviewed- and noted below   Recent Labs   Lab 03/26/22  0813   *   .

## 2022-03-26 NOTE — H&P
[facility]  History & Physical    Patient Name: Antonia Polk  MRN: [id number]  Patient Class: IP- Inpatient  Admission Date: 3/26/2022  Attending Physician: Ricardo Alcantar MD   Primary Care Provider: Jose Phillips MD         Patient information was obtained from patient, spouse/SO and ER records.     Subjective:     Principal Problem:Acute on chronic combined systolic and diastolic congestive heart failure    Chief Complaint:   Chief Complaint   Patient presents with    Shortness of Breath     Sob was in this er this morning and not better        HPI: 65 yo female with PMH of CAD with MI in 2005 s/p stent and AICD placement, DMII, htn, dld, CHF presents to the ER with complaints of worsening dyspnea on exertion and shortness of breath. She has noted worsening leg swelling over the past several days and has being using lasix 20 mg PO BID, which she is prescribed to use as needed for swelling and SOB, for the last several days with minimal relief. She was evaluated in the ER this morning and given lasix which improved symptoms somewhat and was discharged home. Later in the afternoon she became increasingly short of breath and was unable to walk to the bathroom without becoming winded so returned to the ER where she was given IV lasix with some relief. BNP elevated in the 800s. Troponin normal. CXR with interstitial edema favoring pulmonary edema. Hospital team called for admission.      Past Medical History:   Diagnosis Date    Coronary artery disease     Diabetes mellitus     Hyperlipidemia     Hypertension     MI (myocardial infarction)     Neuropathy     Plantar fasciitis     Thyroid disease        Past Surgical History:   Procedure Laterality Date    A-V CARDIAC PACEMAKER INSERTION      HYSTERECTOMY      INSERTION OF PACEMAKER      REMOVAL OF IMPLANTED CARDIOVERTER-DEFIBRILLATOR (ICD)         Review of patient's allergies indicates:   Allergen Reactions    Demerol  [meperidine]     Diclofenac      Itching., GI issues.        No current facility-administered medications on file prior to encounter.     Current Outpatient Medications on File Prior to Encounter   Medication Sig    albuterol (PROVENTIL/VENTOLIN HFA) 90 mcg/actuation inhaler Inhale 1-2 puffs into the lungs every 4 (four) hours as needed for Wheezing or Shortness of Breath. Rescue    amiodarone (PACERONE) 200 MG Tab Take by mouth once daily.    aspirin (ECOTRIN) 81 MG EC tablet Take 81 mg by mouth once daily.    buPROPion (WELLBUTRIN XL) 150 MG TB24 tablet   1 tab, Oral, every 24 hours, # 90 tab, 3 Refill(s), Pharmacy: [id number], 173, cm, 21 13:29:00 CDT, Height/Length Measured, 98.4, kg, 21 14:36:00 CST, Weight Dosing    empagliflozin (JARDIANCE) 25 mg tablet Take 25 mg by mouth once daily.    ezetimibe (ZETIA) 10 mg tablet   See Instructions, Take 1 tablet by mouth once daily for 90 days, # 90 tab, 1 Refill(s), Pharmacy: [id number], 173, cm, 21 10:41:00 CST, Height/Length Measured, 104.5, kg, 21 10:41:00 CST, Weight Dosing    furosemide (LASIX) 20 MG tablet 20 mg.    furosemide (LASIX) 20 MG tablet Take 1 tablet (20 mg total) by mouth 2 (two) times daily. for 5 days    gabapentin (NEURONTIN) 300 MG capsule Take 300 mg by mouth once daily.    hyoscyamine (ANASPAZ,LEVSIN) 0.125 mg Tab Take 1 tablet (125 mcg total) by mouth every 4 (four) hours as needed (abdominal cramping).    levothyroxine (SYNTHROID) 50 MCG tablet   = 1 tab, Oral, Daily, # 60 tab, 5 Refill(s), Pharmacy: [id number], 173, cm, 10/07/21 16:04:00 CDT, Height/Length Measured, 104.8, kg, 10/07/21 16:04:00 CDT, Weight Dosing    meloxicam (MOBIC) 15 MG tablet Take 1 tablet (15 mg total) by mouth once daily. for 14 days    metoprolol tartrate (LOPRESSOR) 25 MG tablet Take 25 mg by mouth 2 (two) times daily.    nitroGLYCERIN (NITROSTAT) 0.4 MG SL tablet SMARTSI Tablet(s) Sublingual  PRN    omeprazole (PRILOSEC) 40 MG capsule Take 40 mg by mouth once daily.    ondansetron (ZOFRAN-ODT) 8 MG TbDL 8 mg.    potassium chloride SA (K-DUR,KLOR-CON) 20 MEQ tablet Take 1 tablet (20 mEq total) by mouth once daily. for 5 days    rosuvastatin (CRESTOR) 40 MG Tab Take 40 mg by mouth once daily.    sacubitriL-valsartan (ENTRESTO) 49-51 mg per tablet   1 tab, Oral, BID, # 60 tab, 0 Refill(s), Pharmacy: [id number], 173, cm, 11/23/21 10:41:00 CST, Height/Length Measured, 104.5, kg, 11/23/21 10:41:00 CST, Weight Dosing    [DISCONTINUED] ezetimibe (ZETIA) 10 mg tablet Take 10 mg by mouth once daily.     Family History    None       Tobacco Use    Smoking status: Never Smoker    Smokeless tobacco: Never Used   Substance and Sexual Activity    Alcohol use: Yes     Comment: occ    Drug use: Never    Sexual activity: Yes     Birth control/protection: See Surgical Hx     Review of Systems   Constitutional:  Positive for unexpected weight change. Negative for fatigue and fever.   HENT: Negative.     Eyes: Negative.    Respiratory:  Positive for cough and shortness of breath. Negative for chest tightness and wheezing.    Cardiovascular:  Positive for leg swelling. Negative for chest pain.        Dyspnea on exertion   Gastrointestinal:  Negative for abdominal pain.   Endocrine: Negative.    Genitourinary: Negative.    Musculoskeletal: Negative.    Skin: Negative.    Allergic/Immunologic: Negative.    Neurological: Negative.    Hematological: Negative.    Psychiatric/Behavioral: Negative.     Objective:     Vital Signs (Most Recent):  Temp: 98.1 °F (36.7 °C) (03/26/22 1638)  Pulse: 73 (03/26/22 1638)  Resp: (!) 22 (03/26/22 1638)  SpO2: (!) 90 % (03/26/22 1638)   Vital Signs (24h Range):  Temp:  [98.1 °F (36.7 °C)] 98.1 °F (36.7 °C)  Pulse:  [53-73] 73  Resp:  [17-22] 22  SpO2:  [90 %-94 %] 90 %  BP: (153-163)/(79-88) 153/88     Weight: 102.1 kg (225 lb)  Body mass index is 34.21 kg/m².    Physical  Exam  Vitals reviewed.   Constitutional:       General: She is not in acute distress.     Appearance: She is not toxic-appearing or diaphoretic.   HENT:      Head: Normocephalic and atraumatic.      Right Ear: External ear normal.      Left Ear: External ear normal.      Nose: Nose normal.      Mouth/Throat:      Mouth: Mucous membranes are moist.   Eyes:      General: No scleral icterus.     Conjunctiva/sclera: Conjunctivae normal.   Cardiovascular:      Rate and Rhythm: Normal rate and regular rhythm.      Pulses: Normal pulses.      Heart sounds: No murmur heard.    No gallop.   Pulmonary:      Breath sounds: Wheezing (end expiratory anterior) and rales (bibasilar) present.      Comments: Tachypnea, conversational dyspnea  Abdominal:      General: Bowel sounds are normal. There is no distension.      Palpations: Abdomen is soft.      Tenderness: There is no abdominal tenderness. There is no guarding.   Musculoskeletal:      Right lower leg: Edema present.      Left lower leg: Edema present.      Comments: 3+ pitting edema to the knee   Skin:     General: Skin is warm and dry.      Coloration: Skin is not jaundiced.      Comments: flushed   Neurological:      Mental Status: She is alert and oriented to person, place, and time. Mental status is at baseline.   Psychiatric:         Mood and Affect: Mood normal.         Behavior: Behavior normal.           Significant Labs: All pertinent labs within the past 24 hours have been reviewed.  CBC:   Recent Labs   Lab 03/26/22  0813   WBC 4.72   HGB 13.3   HCT 41.2        CMP:   Recent Labs   Lab 03/26/22  0813      K 3.7      CO2 22*   *   BUN 16   CREATININE 0.7   CALCIUM 8.7   PROT 6.5   ALBUMIN 3.0*   BILITOT 0.8   ALKPHOS 70   AST 23   ALT 23   ANIONGAP 10   EGFRNONAA >60.0     Cardiac Markers:   Recent Labs   Lab 03/26/22  0813   *       Significant Imaging: I have reviewed all pertinent imaging results/findings within the past 24  hours.  Narrative & Impression  EXAMINATION:  XR CHEST AP PORTABLE     CLINICAL HISTORY:  CHF;     TECHNIQUE:  Single frontal view of the chest was performed.     COMPARISON:  11/12/2021     FINDINGS:  Left lower lung zone partially obscured.     There is interstitial disease bilateral mid and lower lung zones.  Enlarged cardiac silhouette with cardiac conduction device.  No pleural effusion or pneumothorax.     Impression:     Interstitial opacities favor pulmonary edema given cardiomegaly.  Atypical infection or pneumonitis also possible depending on clinical presentation.        Electronically signed by: Griffin Henry  Date:                                            03/26/2022  Time:                                           08:37      Assessment/Plan:     * Acute on chronic combined systolic and diastolic congestive heart failure  Patient is identified as having Combined Systolic and Diastolic heart failure that is Acute on chronic. CHF is currently uncontrolled due to Continued edema of extremities, Dyspnea not returned to baseline after 1 doses of IV diuretic, Rales/crackles on pulmonary exam and Pulmonary edema/pleural effusion on CXR. Latest ECHO performed in 06/2021 - reported to have EF 40%.   Has CAD with AICD, h/o multiple stents and h/o of MI in 2005    Continue Beta Blocker and Furosemide and monitor clinical status closely. Monitor on telemetry.Monitor strict Is&Os and daily weights.  Place on fluid restriction of 1.5 L. Continue to stress to patient importance of self efficacy and  on diet for CHF. Last BNP reviewed- and noted below   Recent Labs   Lab 03/26/22  0813   *   .      Hypothyroid  Continue home medications      Coronary artery disease involving native coronary artery of native heart without angina pectoris  Continue ASA, amiodarone  With AICD in place, last interrogated in 2021  Last echo and stress test in 2021 with no need for coronary intervention      Type 2 diabetes  mellitus without complication, without long-term current use of insulin  Patient's FSGs are controlled on current medication regimen.  Last A1c reviewed- No results found for: LABA1C, HGBA1C  Most recent fingerstick glucose reviewed- No results for input(s): POCTGLUCOSE in the last 24 hours.  Current correctional scale  none, add if uncontrolled hyperglycemia    Antihyperglycemics (From admission, onward)            None        Hold Oral hypoglycemics while patient is in the hospital.          VTE Risk Mitigation (From admission, onward)    None             Maine Carbone MD  Department of Hospital Medicine   [place] - Emergency Dept

## 2022-03-26 NOTE — ED PROVIDER NOTES
Encounter Date: 3/26/2022       History     Chief Complaint   Patient presents with    Shortness of Breath     Patient reports dyspnea x 2 days with increasing severity this am.      66-year-old female here complaining of a 2 day history of shortness of breath, slightly worse today.  She has a history of heart attack in the past, and states that she developed congestive heart failure soon thereafter.  She had a pacemaker defibrillator inserted which she states helped significantly.  She has had only 2 episodes of congestive heart failure since then.  She has a prescription for Lasix which she rarely takes.  Yesterday she took 20 mg x 1. This morning, her symptoms were still present, and were in fact slightly worse.  Denies cough or fever.  She has noticed some mildly increased ankle swelling.  O2 saturations here are 94% on room air.  Symptoms are slightly worse with exertion.        Review of patient's allergies indicates:   Allergen Reactions    Demerol [meperidine]     Diclofenac      Itching., GI issues.      Past Medical History:   Diagnosis Date    Coronary artery disease     Diabetes mellitus     Hyperlipidemia     Hypertension     MI (myocardial infarction)     Neuropathy     Plantar fasciitis     Thyroid disease      Past Surgical History:   Procedure Laterality Date    A-V CARDIAC PACEMAKER INSERTION      HYSTERECTOMY      INSERTION OF PACEMAKER      REMOVAL OF IMPLANTED CARDIOVERTER-DEFIBRILLATOR (ICD)       History reviewed. No pertinent family history.  Social History     Tobacco Use    Smoking status: Never Smoker    Smokeless tobacco: Never Used   Substance Use Topics    Alcohol use: Yes     Comment: occ    Drug use: Never     Review of Systems   Constitutional: Negative.    HENT: Negative.    Eyes: Negative.    Respiratory: Positive for shortness of breath.    Cardiovascular: Positive for leg swelling.   Gastrointestinal: Negative.    Endocrine: Negative.    Genitourinary:  Negative.    Musculoskeletal: Negative.    Skin: Negative.    Allergic/Immunologic: Negative.    Neurological: Negative.    Psychiatric/Behavioral: Negative.        Physical Exam     Initial Vitals [03/26/22 0744]   BP Pulse Resp Temp SpO2   (!) 163/86 63 18 98.1 °F (36.7 °C) (!) 92 %      MAP       --         Physical Exam    Nursing note and vitals reviewed.  Constitutional: She appears well-developed and well-nourished. She is not diaphoretic. No distress.   HENT:   Head: Normocephalic and atraumatic.   Right Ear: External ear normal.   Left Ear: External ear normal.   Nose: Nose normal.   Mouth/Throat: Oropharynx is clear and moist.   Eyes: Conjunctivae and EOM are normal. Pupils are equal, round, and reactive to light.   Neck: Neck supple. No tracheal deviation present.   Normal range of motion.  Cardiovascular: Normal rate, regular rhythm, normal heart sounds and intact distal pulses.   No murmur heard.  Pulmonary/Chest: Breath sounds normal. No stridor. No respiratory distress. She has no wheezes.   Abdominal: Abdomen is soft. Bowel sounds are normal. She exhibits no distension. There is no abdominal tenderness.   Musculoskeletal:         General: No tenderness or edema. Normal range of motion.      Cervical back: Normal range of motion and neck supple.     Neurological: She is alert and oriented to person, place, and time. She has normal strength and normal reflexes. GCS score is 15. GCS eye subscore is 4. GCS verbal subscore is 5. GCS motor subscore is 6.   Skin: Skin is warm and dry. Capillary refill takes less than 2 seconds. No rash noted. No erythema.   Psychiatric: She has a normal mood and affect. Her behavior is normal.         ED Course   Procedures  Labs Reviewed   CBC W/ AUTO DIFFERENTIAL - Abnormal; Notable for the following components:       Result Value    Immature Granulocytes 1.1 (*)     Immature Grans (Abs) 0.05 (*)     All other components within normal limits   COMPREHENSIVE METABOLIC  PANEL - Abnormal; Notable for the following components:    CO2 22 (*)     Glucose 210 (*)     Albumin 3.0 (*)     All other components within normal limits   B-TYPE NATRIURETIC PEPTIDE - Abnormal; Notable for the following components:     (*)     All other components within normal limits   TROPONIN I     EKG Readings: (Independently Interpreted)   EKG, personally reviewed by me, shows normal sinus rhythm at 62 beats per minute.  Low voltage.  Evidence for old anterolateral infarct, NH interval 186, . No ST elevations or depressions, no arrhythmia.         Imaging Results          X-Ray Chest AP Portable (Final result)  Result time 03/26/22 08:37:27    Final result by Griffin Henry MD (03/26/22 08:37:27)                 Impression:      Interstitial opacities favor pulmonary edema given cardiomegaly.  Atypical infection or pneumonitis also possible depending on clinical presentation.      Electronically signed by: Griffin Henry  Date:    03/26/2022  Time:    08:37             Narrative:    EXAMINATION:  XR CHEST AP PORTABLE    CLINICAL HISTORY:  CHF;    TECHNIQUE:  Single frontal view of the chest was performed.    COMPARISON:  11/12/2021    FINDINGS:  Left lower lung zone partially obscured.    There is interstitial disease bilateral mid and lower lung zones.  Enlarged cardiac silhouette with cardiac conduction device.  No pleural effusion or pneumothorax.                              X-Rays:   Independently Interpreted Readings:   Other Readings:  Chest x-ray, personally reviewed by me, shows cardiomegaly, signs of slight pulmonary edema bilaterally, and normal skeletal structures.    Medications - No data to display  Medical Decision Making:   Differential Diagnosis:   Congestive heart failure, pneumonia, viral illness, myocardial infarction, pacemaker failure, etc.  ED Management:  Patient's BNP is elevated, and on chest x-ray, there appeared to be slight bilateral pulmonary infiltrates  consistent with CHF.  She has been complaining of worsening shortness of breath, especially with exertion.  Normal white blood cell count.  No evidence of MI.  discussed all of this with the patient, and also discussed admission versus a trial Lasix at home.  Patient states that she does not want to be admitted unless necessary, and I believe it is reasonable for her to try Lasix at home.  She was given strict return precautions however he will come back if she does not see improvement after 2 days.  She will come back sooner as needed or if worse in any way.  She will contact her cardiologist via telephone later today.                        Clinical Impression:   Final diagnoses:  [R06.02] Shortness of breath  [I50.9] Congestive heart failure, unspecified HF chronicity, unspecified heart failure type (Primary)          ED Disposition Condition    Discharge Stable        ED Prescriptions     Medication Sig Dispense Start Date End Date Auth. Provider    furosemide (LASIX) 20 MG tablet Take 1 tablet (20 mg total) by mouth 2 (two) times daily. for 5 days 10 tablet 3/26/2022 3/31/2022 Ricardo Alcantar MD    potassium chloride SA (K-DUR,KLOR-CON) 20 MEQ tablet Take 1 tablet (20 mEq total) by mouth once daily. for 5 days 5 tablet 3/26/2022 3/31/2022 Ricardo Alcantar MD        Follow-up Information     Follow up With Specialties Details Why Contact Info    Jose Phillips MD Cardiology Call today  [address] [place] [id number]  [phone]      [facility] Emergency Medicine  As needed, If symptoms worsen [address] [facility] [address] [phone]           Ricardo Alcantar MD  03/26/22 0775

## 2022-03-26 NOTE — SUBJECTIVE & OBJECTIVE
Past Medical History:   Diagnosis Date    Coronary artery disease     Diabetes mellitus     Hyperlipidemia     Hypertension     MI (myocardial infarction)     Neuropathy     Plantar fasciitis     Thyroid disease        Past Surgical History:   Procedure Laterality Date    A-V CARDIAC PACEMAKER INSERTION      HYSTERECTOMY      INSERTION OF PACEMAKER      REMOVAL OF IMPLANTED CARDIOVERTER-DEFIBRILLATOR (ICD)         Review of patient's allergies indicates:   Allergen Reactions    Demerol [meperidine]     Diclofenac      Itching., GI issues.        No current facility-administered medications on file prior to encounter.     Current Outpatient Medications on File Prior to Encounter   Medication Sig    albuterol (PROVENTIL/VENTOLIN HFA) 90 mcg/actuation inhaler Inhale 1-2 puffs into the lungs every 4 (four) hours as needed for Wheezing or Shortness of Breath. Rescue    amiodarone (PACERONE) 200 MG Tab Take by mouth once daily.    aspirin (ECOTRIN) 81 MG EC tablet Take 81 mg by mouth once daily.    buPROPion (WELLBUTRIN XL) 150 MG TB24 tablet   1 tab, Oral, every 24 hours, # 90 tab, 3 Refill(s), Pharmacy: [id number], 173, cm, 03/16/21 13:29:00 CDT, Height/Length Measured, 98.4, kg, 01/26/21 14:36:00 CST, Weight Dosing    empagliflozin (JARDIANCE) 25 mg tablet Take 25 mg by mouth once daily.    ezetimibe (ZETIA) 10 mg tablet   See Instructions, Take 1 tablet by mouth once daily for 90 days, # 90 tab, 1 Refill(s), Pharmacy: [id number], 173, cm, 11/23/21 10:41:00 CST, Height/Length Measured, 104.5, kg, 11/23/21 10:41:00 CST, Weight Dosing    furosemide (LASIX) 20 MG tablet 20 mg.    furosemide (LASIX) 20 MG tablet Take 1 tablet (20 mg total) by mouth 2 (two) times daily. for 5 days    gabapentin (NEURONTIN) 300 MG capsule Take 300 mg by mouth once daily.    hyoscyamine (ANASPAZ,LEVSIN) 0.125 mg Tab Take 1 tablet (125 mcg total) by mouth every 4 (four) hours as needed (abdominal cramping).     levothyroxine (SYNTHROID) 50 MCG tablet   = 1 tab, Oral, Daily, # 60 tab, 5 Refill(s), Pharmacy: [id number], 173, cm, 10/07/21 16:04:00 CDT, Height/Length Measured, 104.8, kg, 10/07/21 16:04:00 CDT, Weight Dosing    meloxicam (MOBIC) 15 MG tablet Take 1 tablet (15 mg total) by mouth once daily. for 14 days    metoprolol tartrate (LOPRESSOR) 25 MG tablet Take 25 mg by mouth 2 (two) times daily.    nitroGLYCERIN (NITROSTAT) 0.4 MG SL tablet SMARTSI Tablet(s) Sublingual PRN    omeprazole (PRILOSEC) 40 MG capsule Take 40 mg by mouth once daily.    ondansetron (ZOFRAN-ODT) 8 MG TbDL 8 mg.    potassium chloride SA (K-DUR,KLOR-CON) 20 MEQ tablet Take 1 tablet (20 mEq total) by mouth once daily. for 5 days    rosuvastatin (CRESTOR) 40 MG Tab Take 40 mg by mouth once daily.    sacubitriL-valsartan (ENTRESTO) 49-51 mg per tablet   1 tab, Oral, BID, # 60 tab, 0 Refill(s), Pharmacy: [id number], 173, cm, 21 10:41:00 CST, Height/Length Measured, 104.5, kg, 21 10:41:00 CST, Weight Dosing    [DISCONTINUED] ezetimibe (ZETIA) 10 mg tablet Take 10 mg by mouth once daily.     Family History    None       Tobacco Use    Smoking status: Never Smoker    Smokeless tobacco: Never Used   Substance and Sexual Activity    Alcohol use: Yes     Comment: occ    Drug use: Never    Sexual activity: Yes     Birth control/protection: See Surgical Hx     Review of Systems   Constitutional:  Positive for unexpected weight change. Negative for fatigue and fever.   HENT: Negative.     Eyes: Negative.    Respiratory:  Positive for cough and shortness of breath. Negative for chest tightness and wheezing.    Cardiovascular:  Positive for leg swelling. Negative for chest pain.        Dyspnea on exertion   Gastrointestinal:  Negative for abdominal pain.   Endocrine: Negative.    Genitourinary: Negative.    Musculoskeletal: Negative.    Skin: Negative.    Allergic/Immunologic: Negative.    Neurological: Negative.     Hematological: Negative.    Psychiatric/Behavioral: Negative.     Objective:     Vital Signs (Most Recent):  Temp: 98.1 °F (36.7 °C) (03/26/22 1638)  Pulse: 73 (03/26/22 1638)  Resp: (!) 22 (03/26/22 1638)  SpO2: (!) 90 % (03/26/22 1638)   Vital Signs (24h Range):  Temp:  [98.1 °F (36.7 °C)] 98.1 °F (36.7 °C)  Pulse:  [53-73] 73  Resp:  [17-22] 22  SpO2:  [90 %-94 %] 90 %  BP: (153-163)/(79-88) 153/88     Weight: 102.1 kg (225 lb)  Body mass index is 34.21 kg/m².    Physical Exam  Vitals reviewed.   Constitutional:       General: She is not in acute distress.     Appearance: She is not toxic-appearing or diaphoretic.   HENT:      Head: Normocephalic and atraumatic.      Right Ear: External ear normal.      Left Ear: External ear normal.      Nose: Nose normal.      Mouth/Throat:      Mouth: Mucous membranes are moist.   Eyes:      General: No scleral icterus.     Conjunctiva/sclera: Conjunctivae normal.   Cardiovascular:      Rate and Rhythm: Normal rate and regular rhythm.      Pulses: Normal pulses.      Heart sounds: No murmur heard.    No gallop.   Pulmonary:      Breath sounds: Wheezing (end expiratory anterior) and rales (bibasilar) present.      Comments: Tachypnea, conversational dyspnea  Abdominal:      General: Bowel sounds are normal. There is no distension.      Palpations: Abdomen is soft.      Tenderness: There is no abdominal tenderness. There is no guarding.   Musculoskeletal:      Right lower leg: Edema present.      Left lower leg: Edema present.      Comments: 3+ pitting edema to the knee   Skin:     General: Skin is warm and dry.      Coloration: Skin is not jaundiced.      Comments: flushed   Neurological:      Mental Status: She is alert and oriented to person, place, and time. Mental status is at baseline.   Psychiatric:         Mood and Affect: Mood normal.         Behavior: Behavior normal.           Significant Labs: All pertinent labs within the past 24 hours have been reviewed.  CBC:    Recent Labs   Lab 03/26/22  0813   WBC 4.72   HGB 13.3   HCT 41.2        CMP:   Recent Labs   Lab 03/26/22  0813      K 3.7      CO2 22*   *   BUN 16   CREATININE 0.7   CALCIUM 8.7   PROT 6.5   ALBUMIN 3.0*   BILITOT 0.8   ALKPHOS 70   AST 23   ALT 23   ANIONGAP 10   EGFRNONAA >60.0     Cardiac Markers:   Recent Labs   Lab 03/26/22  0813   *       Significant Imaging: I have reviewed all pertinent imaging results/findings within the past 24 hours.  Narrative & Impression  EXAMINATION:  XR CHEST AP PORTABLE     CLINICAL HISTORY:  CHF;     TECHNIQUE:  Single frontal view of the chest was performed.     COMPARISON:  11/12/2021     FINDINGS:  Left lower lung zone partially obscured.     There is interstitial disease bilateral mid and lower lung zones.  Enlarged cardiac silhouette with cardiac conduction device.  No pleural effusion or pneumothorax.     Impression:     Interstitial opacities favor pulmonary edema given cardiomegaly.  Atypical infection or pneumonitis also possible depending on clinical presentation.        Electronically signed by: Griffin Henry  Date:                                            03/26/2022  Time:                                           08:37

## 2022-03-26 NOTE — HPI
67 yo female with PMH of CAD with MI in 2005 s/p stent and AICD placement, DMII, htn, dld, CHF presents to the ER with complaints of worsening dyspnea on exertion and shortness of breath. She has noted worsening leg swelling over the past several days and has being using lasix 20 mg PO BID, which she is prescribed to use as needed for swelling and SOB, for the last several days with minimal relief. She was evaluated in the ER this morning and given lasix which improved symptoms somewhat and was discharged home. Later in the afternoon she became increasingly short of breath and was unable to walk to the bathroom without becoming winded so returned to the ER where she was given IV lasix with some relief. BNP elevated in the 800s. Troponin normal. CXR with interstitial edema favoring pulmonary edema. Hospital team called for admission.

## 2022-03-26 NOTE — ED PROVIDER NOTES
Encounter Date: 3/26/2022       History     Chief Complaint   Patient presents with    Shortness of Breath     Sob was in this er this morning and not better     66-year-old female with a history of CHF, seen here this morning and discharged with prescription for Lasix, comes back this evening stating that she took the Lasix x1 dose at home, and began diuresing, but she had another episode of shortness of breath so she returns here for re-evaluation.  Denies chest pain or palpitations.  No fevers or chills.  She does state that her ankles, which had been swollen, or much better now.        Review of patient's allergies indicates:   Allergen Reactions    Demerol [meperidine]     Diclofenac      Itching., GI issues.      Past Medical History:   Diagnosis Date    Coronary artery disease     Diabetes mellitus     Hyperlipidemia     Hypertension     MI (myocardial infarction)     Neuropathy     Plantar fasciitis     Thyroid disease      Past Surgical History:   Procedure Laterality Date    A-V CARDIAC PACEMAKER INSERTION      HYSTERECTOMY      INSERTION OF PACEMAKER      REMOVAL OF IMPLANTED CARDIOVERTER-DEFIBRILLATOR (ICD)       History reviewed. No pertinent family history.  Social History     Tobacco Use    Smoking status: Never Smoker    Smokeless tobacco: Never Used   Substance Use Topics    Alcohol use: Yes     Comment: occ    Drug use: Never     Review of Systems   Constitutional: Negative.    HENT: Negative.    Eyes: Negative.    Respiratory: Positive for shortness of breath.    Cardiovascular: Negative.    Gastrointestinal: Negative.    Endocrine: Negative.    Genitourinary: Negative.    Neurological: Negative.    Hematological: Negative.        Physical Exam     Initial Vitals   BP Pulse Resp Temp SpO2   03/26/22 1747 03/26/22 1638 03/26/22 1638 03/26/22 1638 03/26/22 1638   (!) 158/79 73 (!) 22 98.1 °F (36.7 °C) (!) 90 %      MAP       --                Physical Exam    Nursing note and  vitals reviewed.  Constitutional: She appears well-developed and well-nourished. She is not diaphoretic. No distress.   HENT:   Head: Normocephalic and atraumatic.   Right Ear: External ear normal.   Left Ear: External ear normal.   Nose: Nose normal.   Mouth/Throat: Oropharynx is clear and moist.   Eyes: Conjunctivae and EOM are normal. Pupils are equal, round, and reactive to light.   Neck: Neck supple. No JVD present.   Normal range of motion.  Cardiovascular: Normal rate, regular rhythm, normal heart sounds and intact distal pulses.   No murmur heard.  Pulmonary/Chest: No stridor. She has rales (Bilateral).   Abdominal: Abdomen is soft. Bowel sounds are normal. She exhibits no distension. There is no abdominal tenderness.   Musculoskeletal:         General: No tenderness or edema. Normal range of motion.      Cervical back: Normal range of motion and neck supple.     Neurological: She is alert and oriented to person, place, and time. She has normal reflexes. She displays normal reflexes. No cranial nerve deficit or sensory deficit. GCS score is 15. GCS eye subscore is 4. GCS verbal subscore is 5. GCS motor subscore is 6.   Skin: Skin is warm and dry. Capillary refill takes less than 2 seconds. No rash noted. No erythema.   Psychiatric: She has a normal mood and affect. Her behavior is normal.         ED Course   Procedures  Labs Reviewed   URINALYSIS, REFLEX TO URINE CULTURE - Abnormal; Notable for the following components:       Result Value    Glucose, UA 2+ (*)     All other components within normal limits    Narrative:     Preferred Collection Type->Urine, Clean Catch  Specimen Source->Urine   SARS-COV-2 RNA AMPLIFICATION, QUAL    Narrative:     Is the patient symptomatic?->Yes          Imaging Results    None          Medications   amiodarone tablet 200 mg (has no administration in time range)   aspirin EC tablet 81 mg (has no administration in time range)   buPROPion TBSR 12 hr tablet 150 mg (has no  administration in time range)   gabapentin capsule 300 mg (has no administration in time range)   levothyroxine tablet 50 mcg (has no administration in time range)   metoprolol tartrate (LOPRESSOR) tablet 25 mg (has no administration in time range)   pantoprazole EC tablet 40 mg (has no administration in time range)   nitroGLYCERIN SL tablet 0.4 mg (has no administration in time range)   atorvastatin tablet 80 mg (has no administration in time range)   furosemide injection 40 mg (has no administration in time range)   furosemide injection 20 mg (20 mg Intravenous Given 3/26/22 1716)     Medical Decision Making:   Differential Diagnosis:   CHF exacerbation, pneumonia, pneumothorax, etc.  ED Management:  Spoke to Dr. Carbone about the patient who agrees to admit for CHF treatment.  Patient will be given IV Lasix here in the emergency department before going to the floor.                      Clinical Impression:   Final diagnoses:  [R06.02] SOB (shortness of breath)  [I50.9] Acute exacerbation of CHF (congestive heart failure)  [I50.43] Acute on chronic combined systolic and diastolic congestive heart failure          ED Disposition Condition    Admit               Ricardo Alcantar MD  03/26/22 1529

## 2022-03-26 NOTE — DISCHARGE INSTRUCTIONS
Take Lasix and potassium as we discussed, and follow-up with your cardiologist via telephone today.  Return here as needed or if worse in any way.

## 2022-03-26 NOTE — ASSESSMENT & PLAN NOTE
Patient's FSGs are controlled on current medication regimen.  Last A1c reviewed- No results found for: LABA1C, HGBA1C  Most recent fingerstick glucose reviewed- No results for input(s): POCTGLUCOSE in the last 24 hours.  Current correctional scale  none, add if uncontrolled hyperglycemia    Antihyperglycemics (From admission, onward)            None        Hold Oral hypoglycemics while patient is in the hospital.

## 2022-03-26 NOTE — LETTER
March 27, 2022         [address] [facility] [address]  Phone: [phone]  Fax: [phone]       Patient: Antonia Polk   YOB: 1955  Date of Visit: 03/27/2022    To Whom It May Concern:    Renetta Polk  was [facility] on 3/26/2022-3/27/2022. The patient may return to work 3/30/2022 with no restrictions. If you have any questions or concerns, or if I can be of further assistance, please do not hesitate to contact me.    Sincerely,    Keely Cameron RN

## 2022-03-26 NOTE — ASSESSMENT & PLAN NOTE
Continue ASA, amiodarone  With AICD in place, last interrogated in 2021  Last echo and stress test in 2021 with no need for coronary intervention

## 2022-03-27 ENCOUNTER — HOSPITAL ENCOUNTER (OUTPATIENT)
Facility: HOSPITAL | Age: 67
Discharge: HOME | End: 2022-03-29
Attending: INTERNAL MEDICINE | Admitting: HOSPITALIST
Payer: COMMERCIAL

## 2022-03-27 VITALS
HEIGHT: 68 IN | BODY MASS INDEX: 34.92 KG/M2 | OXYGEN SATURATION: 92 % | DIASTOLIC BLOOD PRESSURE: 71 MMHG | SYSTOLIC BLOOD PRESSURE: 147 MMHG | HEART RATE: 61 BPM | WEIGHT: 230.38 LBS | RESPIRATION RATE: 17 BRPM | TEMPERATURE: 97 F

## 2022-03-27 DIAGNOSIS — R07.9 CHEST PAIN: ICD-10-CM

## 2022-03-27 DIAGNOSIS — E11.9 TYPE 2 DIABETES MELLITUS WITHOUT COMPLICATION, WITHOUT LONG-TERM CURRENT USE OF INSULIN: ICD-10-CM

## 2022-03-27 DIAGNOSIS — R60.0 BILATERAL LOWER EXTREMITY EDEMA: ICD-10-CM

## 2022-03-27 DIAGNOSIS — I50.9 CHF (CONGESTIVE HEART FAILURE): ICD-10-CM

## 2022-03-27 DIAGNOSIS — I50.9 ACUTE EXACERBATION OF CHF (CONGESTIVE HEART FAILURE): ICD-10-CM

## 2022-03-27 DIAGNOSIS — R06.02 SHORTNESS OF BREATH: ICD-10-CM

## 2022-03-27 DIAGNOSIS — I50.43 ACUTE ON CHRONIC COMBINED SYSTOLIC AND DIASTOLIC CHF (CONGESTIVE HEART FAILURE): Primary | ICD-10-CM

## 2022-03-27 DIAGNOSIS — I48.91 ATRIAL FIBRILLATION: ICD-10-CM

## 2022-03-27 LAB
ALBUMIN SERPL BCP-MCNC: 3.2 G/DL (ref 3.5–5.2)
ALP SERPL-CCNC: 76 U/L (ref 55–135)
ALT SERPL W/O P-5'-P-CCNC: 21 U/L (ref 10–44)
ANION GAP SERPL CALC-SCNC: 10 MMOL/L (ref 8–16)
ANION GAP SERPL CALC-SCNC: 12 MMOL/L (ref 8–16)
AST SERPL-CCNC: 20 U/L (ref 10–40)
BASOPHILS # BLD AUTO: 0.01 K/UL (ref 0–0.2)
BASOPHILS # BLD AUTO: 0.03 K/UL (ref 0–0.2)
BASOPHILS NFR BLD: 0.2 % (ref 0–1.9)
BASOPHILS NFR BLD: 0.4 % (ref 0–1.9)
BILIRUB SERPL-MCNC: 0.8 MG/DL (ref 0.1–1)
BNP SERPL-MCNC: 728 PG/ML (ref 0–99)
BUN SERPL-MCNC: 16 MG/DL (ref 8–23)
BUN SERPL-MCNC: 18 MG/DL (ref 8–23)
CALCIUM SERPL-MCNC: 9 MG/DL (ref 8.7–10.5)
CALCIUM SERPL-MCNC: 9 MG/DL (ref 8.7–10.5)
CHLORIDE SERPL-SCNC: 109 MMOL/L (ref 95–110)
CHLORIDE SERPL-SCNC: 110 MMOL/L (ref 95–110)
CO2 SERPL-SCNC: 23 MMOL/L (ref 23–29)
CO2 SERPL-SCNC: 23 MMOL/L (ref 23–29)
CREAT SERPL-MCNC: 0.7 MG/DL (ref 0.5–1.4)
CREAT SERPL-MCNC: 0.8 MG/DL (ref 0.5–1.4)
DIFFERENTIAL METHOD: NORMAL
DIFFERENTIAL METHOD: NORMAL
EOSINOPHIL # BLD AUTO: 0.2 K/UL (ref 0–0.5)
EOSINOPHIL # BLD AUTO: 0.2 K/UL (ref 0–0.5)
EOSINOPHIL NFR BLD: 2.4 % (ref 0–8)
EOSINOPHIL NFR BLD: 2.6 % (ref 0–8)
ERYTHROCYTE [DISTWIDTH] IN BLOOD BY AUTOMATED COUNT: 13.2 % (ref 11.5–14.5)
ERYTHROCYTE [DISTWIDTH] IN BLOOD BY AUTOMATED COUNT: 13.2 % (ref 11.5–14.5)
EST. GFR  (AFRICAN AMERICAN): >60 ML/MIN/1.73 M^2
EST. GFR  (AFRICAN AMERICAN): >60 ML/MIN/1.73 M^2
EST. GFR  (NON AFRICAN AMERICAN): >60 ML/MIN/1.73 M^2
EST. GFR  (NON AFRICAN AMERICAN): >60 ML/MIN/1.73 M^2
GLUCOSE SERPL-MCNC: 117 MG/DL (ref 70–110)
GLUCOSE SERPL-MCNC: 147 MG/DL (ref 70–110)
HCT VFR BLD AUTO: 40.4 % (ref 37–48.5)
HCT VFR BLD AUTO: 43.4 % (ref 37–48.5)
HGB BLD-MCNC: 13.1 G/DL (ref 12–16)
HGB BLD-MCNC: 14.2 G/DL (ref 12–16)
IMM GRANULOCYTES # BLD AUTO: 0.02 K/UL (ref 0–0.04)
IMM GRANULOCYTES # BLD AUTO: 0.02 K/UL (ref 0–0.04)
IMM GRANULOCYTES NFR BLD AUTO: 0.3 % (ref 0–0.5)
IMM GRANULOCYTES NFR BLD AUTO: 0.3 % (ref 0–0.5)
LYMPHOCYTES # BLD AUTO: 1.3 K/UL (ref 1–4.8)
LYMPHOCYTES # BLD AUTO: 1.5 K/UL (ref 1–4.8)
LYMPHOCYTES NFR BLD: 21.3 % (ref 18–48)
LYMPHOCYTES NFR BLD: 21.4 % (ref 18–48)
MAGNESIUM SERPL-MCNC: 1.8 MG/DL (ref 1.6–2.6)
MCH RBC QN AUTO: 30.7 PG (ref 27–31)
MCH RBC QN AUTO: 31 PG (ref 27–31)
MCHC RBC AUTO-ENTMCNC: 32.4 G/DL (ref 32–36)
MCHC RBC AUTO-ENTMCNC: 32.7 G/DL (ref 32–36)
MCV RBC AUTO: 94 FL (ref 82–98)
MCV RBC AUTO: 96 FL (ref 82–98)
MONOCYTES # BLD AUTO: 0.5 K/UL (ref 0.3–1)
MONOCYTES # BLD AUTO: 0.5 K/UL (ref 0.3–1)
MONOCYTES NFR BLD: 7.2 % (ref 4–15)
MONOCYTES NFR BLD: 8.4 % (ref 4–15)
NEUTROPHILS # BLD AUTO: 4.3 K/UL (ref 1.8–7.7)
NEUTROPHILS # BLD AUTO: 4.8 K/UL (ref 1.8–7.7)
NEUTROPHILS NFR BLD: 67.4 % (ref 38–73)
NEUTROPHILS NFR BLD: 68.1 % (ref 38–73)
NRBC BLD-RTO: 0 /100 WBC
NRBC BLD-RTO: 0 /100 WBC
PHOSPHATE SERPL-MCNC: 3.3 MG/DL (ref 2.7–4.5)
PLATELET # BLD AUTO: 198 K/UL (ref 150–450)
PLATELET # BLD AUTO: 233 K/UL (ref 150–450)
PMV BLD AUTO: 9.3 FL (ref 9.2–12.9)
PMV BLD AUTO: 9.6 FL (ref 9.2–12.9)
POCT GLUCOSE: 177 MG/DL (ref 70–110)
POTASSIUM SERPL-SCNC: 3.6 MMOL/L (ref 3.5–5.1)
POTASSIUM SERPL-SCNC: 3.7 MMOL/L (ref 3.5–5.1)
PROT SERPL-MCNC: 7.2 G/DL (ref 6–8.4)
RBC # BLD AUTO: 4.23 M/UL (ref 4–5.4)
RBC # BLD AUTO: 4.62 M/UL (ref 4–5.4)
SODIUM SERPL-SCNC: 143 MMOL/L (ref 136–145)
SODIUM SERPL-SCNC: 144 MMOL/L (ref 136–145)
TROPONIN I SERPL DL<=0.01 NG/ML-MCNC: 0.01 NG/ML (ref 0–0.03)
TROPONIN I SERPL DL<=0.01 NG/ML-MCNC: <0.006 NG/ML (ref 0–0.03)
WBC # BLD AUTO: 6.3 K/UL (ref 3.9–12.7)
WBC # BLD AUTO: 7.05 K/UL (ref 3.9–12.7)

## 2022-03-27 PROCEDURE — 71045 X-RAY EXAM CHEST 1 VIEW: CPT | Mod: TC,FY

## 2022-03-27 PROCEDURE — 93010 ELECTROCARDIOGRAM REPORT: CPT | Mod: ,,, | Performed by: INTERNAL MEDICINE

## 2022-03-27 PROCEDURE — 63600175 PHARM REV CODE 636 W HCPCS: Performed by: HOSPITALIST

## 2022-03-27 PROCEDURE — 84484 ASSAY OF TROPONIN QUANT: CPT | Performed by: FAMILY MEDICINE

## 2022-03-27 PROCEDURE — G0378 HOSPITAL OBSERVATION PER HR: HCPCS

## 2022-03-27 PROCEDURE — 80053 COMPREHEN METABOLIC PANEL: CPT | Performed by: INTERNAL MEDICINE

## 2022-03-27 PROCEDURE — 84484 ASSAY OF TROPONIN QUANT: CPT | Mod: 91 | Performed by: INTERNAL MEDICINE

## 2022-03-27 PROCEDURE — 96374 THER/PROPH/DIAG INJ IV PUSH: CPT

## 2022-03-27 PROCEDURE — 83735 ASSAY OF MAGNESIUM: CPT | Performed by: FAMILY MEDICINE

## 2022-03-27 PROCEDURE — 99220 PR INITIAL OBSERVATION CARE,LEVL III: ICD-10-PCS | Mod: 95,,, | Performed by: HOSPITALIST

## 2022-03-27 PROCEDURE — 63600175 PHARM REV CODE 636 W HCPCS: Performed by: FAMILY MEDICINE

## 2022-03-27 PROCEDURE — 85025 COMPLETE CBC W/AUTO DIFF WBC: CPT | Mod: 91 | Performed by: INTERNAL MEDICINE

## 2022-03-27 PROCEDURE — 85025 COMPLETE CBC W/AUTO DIFF WBC: CPT | Performed by: FAMILY MEDICINE

## 2022-03-27 PROCEDURE — 63600175 PHARM REV CODE 636 W HCPCS: Performed by: INTERNAL MEDICINE

## 2022-03-27 PROCEDURE — 99217 PR OBSERVATION CARE DISCHARGE: CPT | Mod: ,,, | Performed by: FAMILY MEDICINE

## 2022-03-27 PROCEDURE — 25000003 PHARM REV CODE 250: Performed by: HOSPITALIST

## 2022-03-27 PROCEDURE — 80048 BASIC METABOLIC PNL TOTAL CA: CPT | Performed by: FAMILY MEDICINE

## 2022-03-27 PROCEDURE — 83880 ASSAY OF NATRIURETIC PEPTIDE: CPT | Performed by: INTERNAL MEDICINE

## 2022-03-27 PROCEDURE — 93010 EKG 12-LEAD: ICD-10-PCS | Mod: ,,, | Performed by: INTERNAL MEDICINE

## 2022-03-27 PROCEDURE — 99220 PR INITIAL OBSERVATION CARE,LEVL III: CPT | Mod: 95,,, | Performed by: HOSPITALIST

## 2022-03-27 PROCEDURE — 93005 ELECTROCARDIOGRAM TRACING: CPT

## 2022-03-27 PROCEDURE — 84100 ASSAY OF PHOSPHORUS: CPT | Performed by: FAMILY MEDICINE

## 2022-03-27 PROCEDURE — 71045 X-RAY EXAM CHEST 1 VIEW: CPT | Mod: 26,,, | Performed by: RADIOLOGY

## 2022-03-27 PROCEDURE — 96376 TX/PRO/DX INJ SAME DRUG ADON: CPT

## 2022-03-27 PROCEDURE — 96372 THER/PROPH/DIAG INJ SC/IM: CPT | Mod: 59 | Performed by: HOSPITALIST

## 2022-03-27 PROCEDURE — 99217 PR OBSERVATION CARE DISCHARGE: ICD-10-PCS | Mod: ,,, | Performed by: FAMILY MEDICINE

## 2022-03-27 PROCEDURE — 99285 EMERGENCY DEPT VISIT HI MDM: CPT | Mod: 25

## 2022-03-27 PROCEDURE — 71045 XR CHEST AP PORTABLE: ICD-10-PCS | Mod: 26,,, | Performed by: RADIOLOGY

## 2022-03-27 PROCEDURE — 25000003 PHARM REV CODE 250: Performed by: FAMILY MEDICINE

## 2022-03-27 PROCEDURE — 36415 COLL VENOUS BLD VENIPUNCTURE: CPT | Performed by: FAMILY MEDICINE

## 2022-03-27 RX ORDER — IBUPROFEN 200 MG
16 TABLET ORAL
Status: DISCONTINUED | OUTPATIENT
Start: 2022-03-27 | End: 2022-03-29 | Stop reason: HOSPADM

## 2022-03-27 RX ORDER — ACETAMINOPHEN 325 MG/1
650 TABLET ORAL EVERY 8 HOURS PRN
Status: DISCONTINUED | OUTPATIENT
Start: 2022-03-27 | End: 2022-03-29 | Stop reason: HOSPADM

## 2022-03-27 RX ORDER — IPRATROPIUM BROMIDE AND ALBUTEROL SULFATE 2.5; .5 MG/3ML; MG/3ML
3 SOLUTION RESPIRATORY (INHALATION) EVERY 4 HOURS PRN
Status: DISCONTINUED | OUTPATIENT
Start: 2022-03-27 | End: 2022-03-29 | Stop reason: HOSPADM

## 2022-03-27 RX ORDER — PROCHLORPERAZINE EDISYLATE 5 MG/ML
5 INJECTION INTRAMUSCULAR; INTRAVENOUS EVERY 6 HOURS PRN
Status: DISCONTINUED | OUTPATIENT
Start: 2022-03-27 | End: 2022-03-29 | Stop reason: HOSPADM

## 2022-03-27 RX ORDER — SIMETHICONE 80 MG
1 TABLET,CHEWABLE ORAL 4 TIMES DAILY PRN
Status: DISCONTINUED | OUTPATIENT
Start: 2022-03-27 | End: 2022-03-29 | Stop reason: HOSPADM

## 2022-03-27 RX ORDER — ENOXAPARIN SODIUM 100 MG/ML
40 INJECTION SUBCUTANEOUS EVERY 24 HOURS
Status: DISCONTINUED | OUTPATIENT
Start: 2022-03-27 | End: 2022-03-29 | Stop reason: HOSPADM

## 2022-03-27 RX ORDER — GABAPENTIN 300 MG/1
300 CAPSULE ORAL NIGHTLY
Status: DISCONTINUED | OUTPATIENT
Start: 2022-03-27 | End: 2022-03-29 | Stop reason: HOSPADM

## 2022-03-27 RX ORDER — TRAZODONE HYDROCHLORIDE 50 MG/1
50 TABLET ORAL NIGHTLY PRN
Status: DISCONTINUED | OUTPATIENT
Start: 2022-03-27 | End: 2022-03-29 | Stop reason: HOSPADM

## 2022-03-27 RX ORDER — SUCRALFATE 1 G/10ML
1 SUSPENSION ORAL EVERY 6 HOURS
Status: DISCONTINUED | OUTPATIENT
Start: 2022-03-28 | End: 2022-03-27

## 2022-03-27 RX ORDER — ACETAMINOPHEN 325 MG/1
650 TABLET ORAL EVERY 4 HOURS PRN
Status: DISCONTINUED | OUTPATIENT
Start: 2022-03-27 | End: 2022-03-29 | Stop reason: HOSPADM

## 2022-03-27 RX ORDER — PANTOPRAZOLE SODIUM 40 MG/1
40 TABLET, DELAYED RELEASE ORAL DAILY
Status: DISCONTINUED | OUTPATIENT
Start: 2022-03-28 | End: 2022-03-29 | Stop reason: HOSPADM

## 2022-03-27 RX ORDER — NALOXONE HCL 0.4 MG/ML
0.02 VIAL (ML) INJECTION
Status: DISCONTINUED | OUTPATIENT
Start: 2022-03-27 | End: 2022-03-29 | Stop reason: HOSPADM

## 2022-03-27 RX ORDER — METOPROLOL TARTRATE 25 MG/1
25 TABLET, FILM COATED ORAL 2 TIMES DAILY
Status: DISCONTINUED | OUTPATIENT
Start: 2022-03-27 | End: 2022-03-29 | Stop reason: HOSPADM

## 2022-03-27 RX ORDER — INSULIN ASPART 100 [IU]/ML
0-5 INJECTION, SOLUTION INTRAVENOUS; SUBCUTANEOUS
Status: DISCONTINUED | OUTPATIENT
Start: 2022-03-27 | End: 2022-03-29 | Stop reason: HOSPADM

## 2022-03-27 RX ORDER — GLUCAGON 1 MG
1 KIT INJECTION
Status: DISCONTINUED | OUTPATIENT
Start: 2022-03-27 | End: 2022-03-29 | Stop reason: HOSPADM

## 2022-03-27 RX ORDER — ONDANSETRON 2 MG/ML
4 INJECTION INTRAMUSCULAR; INTRAVENOUS EVERY 8 HOURS PRN
Status: DISCONTINUED | OUTPATIENT
Start: 2022-03-27 | End: 2022-03-29 | Stop reason: HOSPADM

## 2022-03-27 RX ORDER — FUROSEMIDE 10 MG/ML
40 INJECTION INTRAMUSCULAR; INTRAVENOUS DAILY
Status: DISCONTINUED | OUTPATIENT
Start: 2022-03-28 | End: 2022-03-29 | Stop reason: HOSPADM

## 2022-03-27 RX ORDER — ASPIRIN 81 MG/1
81 TABLET ORAL DAILY
Status: DISCONTINUED | OUTPATIENT
Start: 2022-03-28 | End: 2022-03-29 | Stop reason: HOSPADM

## 2022-03-27 RX ORDER — MAG HYDROX/ALUMINUM HYD/SIMETH 200-200-20
30 SUSPENSION, ORAL (FINAL DOSE FORM) ORAL 4 TIMES DAILY PRN
Status: DISCONTINUED | OUTPATIENT
Start: 2022-03-27 | End: 2022-03-29 | Stop reason: HOSPADM

## 2022-03-27 RX ORDER — FUROSEMIDE 10 MG/ML
40 INJECTION INTRAMUSCULAR; INTRAVENOUS
Status: COMPLETED | OUTPATIENT
Start: 2022-03-27 | End: 2022-03-27

## 2022-03-27 RX ORDER — ATORVASTATIN CALCIUM 40 MG/1
80 TABLET, FILM COATED ORAL NIGHTLY
Status: DISCONTINUED | OUTPATIENT
Start: 2022-03-27 | End: 2022-03-29 | Stop reason: HOSPADM

## 2022-03-27 RX ORDER — BUPROPION HYDROCHLORIDE 150 MG/1
150 TABLET, EXTENDED RELEASE ORAL 2 TIMES DAILY
Status: DISCONTINUED | OUTPATIENT
Start: 2022-03-27 | End: 2022-03-29 | Stop reason: HOSPADM

## 2022-03-27 RX ORDER — IBUPROFEN 200 MG
24 TABLET ORAL
Status: DISCONTINUED | OUTPATIENT
Start: 2022-03-27 | End: 2022-03-29 | Stop reason: HOSPADM

## 2022-03-27 RX ORDER — LEVOTHYROXINE SODIUM 25 UG/1
50 TABLET ORAL
Status: DISCONTINUED | OUTPATIENT
Start: 2022-03-28 | End: 2022-03-29 | Stop reason: HOSPADM

## 2022-03-27 RX ORDER — AMIODARONE HYDROCHLORIDE 100 MG/1
200 TABLET ORAL DAILY
Status: DISCONTINUED | OUTPATIENT
Start: 2022-03-28 | End: 2022-03-29 | Stop reason: HOSPADM

## 2022-03-27 RX ADMIN — BUPROPION HYDROCHLORIDE 150 MG: 150 TABLET, EXTENDED RELEASE ORAL at 08:03

## 2022-03-27 RX ADMIN — PANTOPRAZOLE SODIUM 40 MG: 40 TABLET, DELAYED RELEASE ORAL at 08:03

## 2022-03-27 RX ADMIN — AMIODARONE HYDROCHLORIDE 200 MG: 100 TABLET ORAL at 08:03

## 2022-03-27 RX ADMIN — FUROSEMIDE 40 MG: 10 INJECTION, SOLUTION INTRAVENOUS at 08:03

## 2022-03-27 RX ADMIN — ALUMINUM HYDROXIDE, MAGNESIUM HYDROXIDE, AND SIMETHICONE 30 ML: 200; 200; 20 SUSPENSION ORAL at 05:03

## 2022-03-27 RX ADMIN — GABAPENTIN 300 MG: 300 CAPSULE ORAL at 10:03

## 2022-03-27 RX ADMIN — FUROSEMIDE 40 MG: 10 INJECTION, SOLUTION INTRAVENOUS at 09:03

## 2022-03-27 RX ADMIN — SUCRALFATE 1 G: 1 SUSPENSION ORAL at 05:03

## 2022-03-27 RX ADMIN — METOPROLOL TARTRATE 25 MG: 25 TABLET, FILM COATED ORAL at 08:03

## 2022-03-27 RX ADMIN — LEVOTHYROXINE SODIUM 50 MCG: 25 TABLET ORAL at 05:03

## 2022-03-27 RX ADMIN — ENOXAPARIN SODIUM 40 MG: 40 INJECTION SUBCUTANEOUS at 10:03

## 2022-03-27 NOTE — DISCHARGE INSTRUCTIONS
Take lasix 40 mg twice daily for the next 3 days. Take potassium supplements - 40 mEq by mouth daily while taking the increased dose of lasix

## 2022-03-27 NOTE — NURSING
D/C instructions provided and explained to pt and pt's spouse, understanding of D/C instructions verbalized. IV removed, catheter intact. Tolerated well. Telemetry monitor removed and returned to monitor room. VSS. Pt denies complaints. Transported off unit via wheelchair

## 2022-03-27 NOTE — PLAN OF CARE
Problem: Adult Inpatient Plan of Care  Goal: Plan of Care Review  Outcome: Ongoing, Progressing  Flowsheets (Taken 3/26/2022 2131)  Plan of Care Reviewed With:   patient   spouse  Goal: Patient-Specific Goal (Individualized)  Outcome: Ongoing, Progressing  Flowsheets (Taken 3/26/2022 2131)  Anxieties, Fears or Concerns: DENIES  Individualized Care Needs: PT DENIES  Goal: Absence of Hospital-Acquired Illness or Injury  Outcome: Ongoing, Progressing  Intervention: Identify and Manage Fall Risk  Flowsheets (Taken 3/26/2022 2131)  Safety Promotion/Fall Prevention:   side rails raised x 2   nonskid shoes/socks when out of bed   instructed to call staff for mobility  Intervention: Prevent Skin Injury  Flowsheets (Taken 3/26/2022 2131)  Body Position: supine  Skin Protection:   adhesive use limited   tubing/devices free from skin contact  Intervention: Prevent and Manage VTE (Venous Thromboembolism) Risk  Flowsheets (Taken 3/26/2022 2131)  Activity Management: Ambulated to bathroom - L4  VTE Prevention/Management:   bleeding risk assessed   ROM (active) performed  Range of Motion: active ROM (range of motion) encouraged  Intervention: Prevent Infection  Flowsheets (Taken 3/26/2022 2131)  Infection Prevention:   environmental surveillance performed   equipment surfaces disinfected   hand hygiene promoted   single patient room provided  Goal: Optimal Comfort and Wellbeing  Outcome: Ongoing, Progressing  Intervention: Monitor Pain and Promote Comfort  Flowsheets (Taken 3/26/2022 2131)  Pain Management Interventions:   care clustered   relaxation techniques promoted  Intervention: Provide Person-Centered Care  Flowsheets (Taken 3/26/2022 2131)  Trust Relationship/Rapport:   care explained   choices provided   emotional support provided   empathic listening provided   questions answered   questions encouraged   reassurance provided  Goal: Readiness for Transition of Care  Outcome: Ongoing, Progressing  Intervention: Mutually  Develop Transition Plan  Flowsheets (Taken 3/26/2022 2131)  Transportation Anticipated: family or friend will provide  Communicated LEON with patient/caregiver: Yes  Do you expect to return to your current living situation?: Yes  Do you have help at home or someone to help you manage your care at home?: Yes  Readmission within 30 days?: No     Problem: Adjustment to Illness (Heart Failure)  Goal: Optimal Coping  Outcome: Ongoing, Progressing  Intervention: Support Psychosocial Response  Flowsheets (Taken 3/26/2022 2131)  Supportive Measures:   active listening utilized   self-care encouraged  Family/Support System Care: involvement promoted     Problem: Cardiac Output Decreased (Heart Failure)  Goal: Optimal Cardiac Output  Outcome: Ongoing, Progressing  Intervention: Optimize Cardiac Output  Flowsheets (Taken 3/26/2022 2131)  Stabilization Measures: airway opened  Environmental Support: calm environment promoted     Problem: Dysrhythmia (Heart Failure)  Goal: Stable Heart Rate and Rhythm  Outcome: Ongoing, Progressing     Problem: Fluid Imbalance (Heart Failure)  Goal: Fluid Balance  Outcome: Ongoing, Progressing  Intervention: Monitor and Manage Fluid Balance  Flowsheets (Taken 3/26/2022 2131)  Fluid/Electrolyte Management: fluids provided     Problem: Functional Ability Impaired (Heart Failure)  Goal: Optimal Functional Ability  Outcome: Ongoing, Progressing  Intervention: Optimize Functional Ability  Flowsheets (Taken 3/26/2022 2131)  Self-Care Promotion: independence encouraged  Activity Management: Ambulated to bathroom - L4     Problem: Oral Intake Inadequate (Heart Failure)  Goal: Optimal Nutrition Intake  Outcome: Ongoing, Progressing  Intervention: Promote and Optimize Nutrition Intake  Flowsheets (Taken 3/26/2022 2131)  Oral Nutrition Promotion: rest periods promoted     Problem: Respiratory Compromise (Heart Failure)  Goal: Effective Oxygenation and Ventilation  Outcome: Ongoing,  Progressing  Intervention: Promote Airway Secretion Clearance  Flowsheets (Taken 3/26/2022 2131)  Breathing Techniques/Airway Clearance: deep/controlled cough encouraged  Cough And Deep Breathing: done independently per patient  Intervention: Optimize Oxygenation and Ventilation  Flowsheets (Taken 3/26/2022 2131)  Airway/Ventilation Management: airway patency maintained     Problem: Sleep Disordered Breathing (Heart Failure)  Goal: Effective Breathing Pattern During Sleep  Outcome: Ongoing, Progressing  Intervention: Monitor and Manage Obstructive Sleep Apnea  Flowsheets (Taken 3/26/2022 2131)  NPPV/CPAP Maintenance: nasal prongs secure   POC reviewed at bedside. Questions and concerns addressed. VSS. Placed bed in low and locked position. Call light within reach. Side rails up x2. Instructed to call for any needs. Verbalized understanding of all instructions. Frequent rounds.

## 2022-03-28 LAB
ALBUMIN SERPL BCP-MCNC: 2.9 G/DL (ref 3.5–5.2)
ALP SERPL-CCNC: 76 U/L (ref 55–135)
ALT SERPL W/O P-5'-P-CCNC: 21 U/L (ref 10–44)
ANION GAP SERPL CALC-SCNC: 13 MMOL/L (ref 8–16)
AST SERPL-CCNC: 19 U/L (ref 10–40)
BASOPHILS # BLD AUTO: 0.03 K/UL (ref 0–0.2)
BASOPHILS NFR BLD: 0.6 % (ref 0–1.9)
BILIRUB SERPL-MCNC: 0.9 MG/DL (ref 0.1–1)
BUN SERPL-MCNC: 20 MG/DL (ref 8–23)
CALCIUM SERPL-MCNC: 9.1 MG/DL (ref 8.7–10.5)
CHLORIDE SERPL-SCNC: 107 MMOL/L (ref 95–110)
CO2 SERPL-SCNC: 24 MMOL/L (ref 23–29)
CREAT SERPL-MCNC: 0.7 MG/DL (ref 0.5–1.4)
D DIMER PPP IA.FEU-MCNC: 0.9 MG/L FEU
DIFFERENTIAL METHOD: ABNORMAL
EOSINOPHIL # BLD AUTO: 0.1 K/UL (ref 0–0.5)
EOSINOPHIL NFR BLD: 2.4 % (ref 0–8)
ERYTHROCYTE [DISTWIDTH] IN BLOOD BY AUTOMATED COUNT: 13 % (ref 11.5–14.5)
EST. GFR  (AFRICAN AMERICAN): >60 ML/MIN/1.73 M^2
EST. GFR  (NON AFRICAN AMERICAN): >60 ML/MIN/1.73 M^2
GLUCOSE SERPL-MCNC: 113 MG/DL (ref 70–110)
HCT VFR BLD AUTO: 41.3 % (ref 37–48.5)
HGB BLD-MCNC: 13.6 G/DL (ref 12–16)
IMM GRANULOCYTES # BLD AUTO: 0.01 K/UL (ref 0–0.04)
IMM GRANULOCYTES NFR BLD AUTO: 0.2 % (ref 0–0.5)
LYMPHOCYTES # BLD AUTO: 1.6 K/UL (ref 1–4.8)
LYMPHOCYTES NFR BLD: 32.5 % (ref 18–48)
MAGNESIUM SERPL-MCNC: 1.9 MG/DL (ref 1.6–2.6)
MCH RBC QN AUTO: 31.1 PG (ref 27–31)
MCHC RBC AUTO-ENTMCNC: 32.9 G/DL (ref 32–36)
MCV RBC AUTO: 94 FL (ref 82–98)
MONOCYTES # BLD AUTO: 0.5 K/UL (ref 0.3–1)
MONOCYTES NFR BLD: 9.4 % (ref 4–15)
NEUTROPHILS # BLD AUTO: 2.7 K/UL (ref 1.8–7.7)
NEUTROPHILS NFR BLD: 54.9 % (ref 38–73)
NRBC BLD-RTO: 0 /100 WBC
PHOSPHATE SERPL-MCNC: 3.3 MG/DL (ref 2.7–4.5)
PLATELET # BLD AUTO: 192 K/UL (ref 150–450)
PMV BLD AUTO: 9.1 FL (ref 9.2–12.9)
POCT GLUCOSE: 114 MG/DL (ref 70–110)
POCT GLUCOSE: 125 MG/DL (ref 70–110)
POCT GLUCOSE: 130 MG/DL (ref 70–110)
POCT GLUCOSE: 131 MG/DL (ref 70–110)
POTASSIUM SERPL-SCNC: 3.4 MMOL/L (ref 3.5–5.1)
PROT SERPL-MCNC: 6.7 G/DL (ref 6–8.4)
RBC # BLD AUTO: 4.38 M/UL (ref 4–5.4)
SODIUM SERPL-SCNC: 144 MMOL/L (ref 136–145)
WBC # BLD AUTO: 4.99 K/UL (ref 3.9–12.7)

## 2022-03-28 PROCEDURE — 25000003 PHARM REV CODE 250: Performed by: INTERNAL MEDICINE

## 2022-03-28 PROCEDURE — 25000003 PHARM REV CODE 250: Performed by: FAMILY MEDICINE

## 2022-03-28 PROCEDURE — G0378 HOSPITAL OBSERVATION PER HR: HCPCS

## 2022-03-28 PROCEDURE — 99226 PR SUBSEQUENT OBSERVATION CARE,LEVEL III: CPT | Mod: ,,, | Performed by: FAMILY MEDICINE

## 2022-03-28 PROCEDURE — 84100 ASSAY OF PHOSPHORUS: CPT | Performed by: HOSPITALIST

## 2022-03-28 PROCEDURE — 93005 ELECTROCARDIOGRAM TRACING: CPT

## 2022-03-28 PROCEDURE — 27000221 HC OXYGEN, UP TO 24 HOURS

## 2022-03-28 PROCEDURE — 94761 N-INVAS EAR/PLS OXIMETRY MLT: CPT

## 2022-03-28 PROCEDURE — 83735 ASSAY OF MAGNESIUM: CPT | Performed by: HOSPITALIST

## 2022-03-28 PROCEDURE — 63600175 PHARM REV CODE 636 W HCPCS: Performed by: HOSPITALIST

## 2022-03-28 PROCEDURE — 36415 COLL VENOUS BLD VENIPUNCTURE: CPT | Performed by: FAMILY MEDICINE

## 2022-03-28 PROCEDURE — 85379 FIBRIN DEGRADATION QUANT: CPT | Performed by: FAMILY MEDICINE

## 2022-03-28 PROCEDURE — 85025 COMPLETE CBC W/AUTO DIFF WBC: CPT | Performed by: HOSPITALIST

## 2022-03-28 PROCEDURE — 96372 THER/PROPH/DIAG INJ SC/IM: CPT | Performed by: HOSPITALIST

## 2022-03-28 PROCEDURE — 80053 COMPREHEN METABOLIC PANEL: CPT | Performed by: HOSPITALIST

## 2022-03-28 PROCEDURE — 36415 COLL VENOUS BLD VENIPUNCTURE: CPT | Performed by: HOSPITALIST

## 2022-03-28 PROCEDURE — 99226 PR SUBSEQUENT OBSERVATION CARE,LEVEL III: ICD-10-PCS | Mod: ,,, | Performed by: FAMILY MEDICINE

## 2022-03-28 PROCEDURE — 25000003 PHARM REV CODE 250: Performed by: HOSPITALIST

## 2022-03-28 RX ORDER — SPIRONOLACTONE 25 MG/1
25 TABLET ORAL DAILY
Status: DISCONTINUED | OUTPATIENT
Start: 2022-03-28 | End: 2022-03-29 | Stop reason: HOSPADM

## 2022-03-28 RX ORDER — POTASSIUM CHLORIDE 20 MEQ/1
40 TABLET, EXTENDED RELEASE ORAL ONCE
Status: COMPLETED | OUTPATIENT
Start: 2022-03-28 | End: 2022-03-28

## 2022-03-28 RX ADMIN — BUPROPION HYDROCHLORIDE 150 MG: 150 TABLET, EXTENDED RELEASE ORAL at 08:03

## 2022-03-28 RX ADMIN — LEVOTHYROXINE SODIUM 50 MCG: 25 TABLET ORAL at 05:03

## 2022-03-28 RX ADMIN — POTASSIUM CHLORIDE 40 MEQ: 1500 TABLET, EXTENDED RELEASE ORAL at 06:03

## 2022-03-28 RX ADMIN — AMIODARONE HYDROCHLORIDE 100 MG: 100 TABLET ORAL at 08:03

## 2022-03-28 RX ADMIN — ASPIRIN 81 MG: 81 TABLET, DELAYED RELEASE ORAL at 08:03

## 2022-03-28 RX ADMIN — GABAPENTIN 300 MG: 300 CAPSULE ORAL at 08:03

## 2022-03-28 RX ADMIN — METOPROLOL TARTRATE 25 MG: 25 TABLET, FILM COATED ORAL at 08:03

## 2022-03-28 RX ADMIN — SPIRONOLACTONE 25 MG: 25 TABLET ORAL at 04:03

## 2022-03-28 RX ADMIN — FUROSEMIDE 40 MG: 10 INJECTION, SOLUTION INTRAMUSCULAR; INTRAVENOUS at 08:03

## 2022-03-28 RX ADMIN — ATORVASTATIN CALCIUM 80 MG: 40 TABLET, FILM COATED ORAL at 08:03

## 2022-03-28 RX ADMIN — PANTOPRAZOLE SODIUM 40 MG: 40 TABLET, DELAYED RELEASE ORAL at 08:03

## 2022-03-28 RX ADMIN — ENOXAPARIN SODIUM 40 MG: 40 INJECTION SUBCUTANEOUS at 04:03

## 2022-03-28 NOTE — ASSESSMENT & PLAN NOTE
No results found for: LABA1C, HGBA1C  No results for input(s): POCTGLUCOSE in the last 24 hours.     Low dose correction scale   Cont blood glucose monitoring   BG goal:  Preprandial blood glucose target <140 mg/dL  Random glucoses <180 mg/dL  Avoid hypoglycemia -  Reduce antihyperglycemic therapy when caloric intake is reduced. Avoid insulin stacking as a result of repeated injection of prandial insulin at close intervals.   Avoid severe hyperglycemia  ADA diet  Antihyperglycemics (From admission, onward)            Start     Stop Route Frequency Ordered    03/27/22 2250  insulin aspart U-100 pen 0-5 Units         -- SubQ Before meals & nightly PRN 03/27/22 7940

## 2022-03-28 NOTE — ASSESSMENT & PLAN NOTE
No results found for: LDLCALC   Patient is chronically on statin  Continue home medication  Monitor for acute changes

## 2022-03-28 NOTE — ASSESSMENT & PLAN NOTE
BP Readings from Last 3 Encounters:   03/27/22 (!) 143/76   03/27/22 (!) 147/71   03/26/22 (!) 153/88     Continuing home medications as prescribed  Will cont to monitor and adjust as needed  Will utilize p.r.n. blood pressure medication only if patient's blood pressure greater than 180/110 and she develops symptoms such as worsening chest pain or shortness of breath.  Cardiac diet    Cardiac/Autonomic (From admission, onward)            Start     Stop Route Frequency Ordered    03/28/22 0900  amiodarone tablet 200 mg         -- Oral Daily 03/27/22 2150 03/27/22 2200  metoprolol tartrate (LOPRESSOR) tablet 25 mg         -- Oral 2 times daily 03/27/22 2150 03/27/22 2200  atorvastatin tablet 80 mg         -- Oral Nightly 03/27/22 2150

## 2022-03-28 NOTE — ASSESSMENT & PLAN NOTE
No results found for: LABA1C, HGBA1C  Recent Labs   Lab 03/28/22  0728   POCTGLUCOSE 131*        Low dose correction scale   Cont blood glucose monitoring   BG goal:  Preprandial blood glucose target <140 mg/dL  Random glucoses <180 mg/dL  Avoid hypoglycemia -  Reduce antihyperglycemic therapy when caloric intake is reduced. Avoid insulin stacking as a result of repeated injection of prandial insulin at close intervals.   Avoid severe hyperglycemia  ADA diet  Antihyperglycemics (From admission, onward)            Start     Stop Route Frequency Ordered    03/27/22 2250  insulin aspart U-100 pen 0-5 Units         -- SubQ Before meals & nightly PRN 03/27/22 2150

## 2022-03-28 NOTE — SUBJECTIVE & OBJECTIVE
Interval History: still with shortness of breath, requiring supplemental oxygen    Review of Systems   Constitutional:  Positive for unexpected weight change. Negative for fatigue and fever.   HENT: Negative.     Eyes: Negative.    Respiratory:  Positive for shortness of breath. Negative for chest tightness and wheezing.    Cardiovascular:  Positive for leg swelling. Negative for chest pain.        Dyspnea on exertion   Gastrointestinal:  Negative for abdominal pain.   Endocrine: Negative.    Genitourinary: Negative.    Musculoskeletal: Negative.    Skin: Negative.    Allergic/Immunologic: Negative.    Neurological: Negative.    Hematological: Negative.    Psychiatric/Behavioral: Negative.     Objective:     Vital Signs (Most Recent):  Temp: 96.4 °F (35.8 °C) (03/28/22 0730)  Pulse: (!) 56 (03/28/22 0832)  Resp: 18 (03/28/22 0730)  BP: (!) 145/67 (03/28/22 0832)  SpO2: (!) 93 % (03/28/22 0730)   Vital Signs (24h Range):  Temp:  [96.4 °F (35.8 °C)-98.9 °F (37.2 °C)] 96.4 °F (35.8 °C)  Pulse:  [55-74] 56  Resp:  [17-27] 18  SpO2:  [69 %-96 %] 93 %  BP: (116-172)/(58-93) 145/67     Weight: 100.5 kg (221 lb 9 oz)  Body mass index is 33.69 kg/m².    Intake/Output Summary (Last 24 hours) at 3/28/2022 1004  Last data filed at 3/28/2022 0500  Gross per 24 hour   Intake 360 ml   Output 400 ml   Net -40 ml      Physical Exam  Vitals reviewed.   Constitutional:       General: She is not in acute distress.     Appearance: She is not toxic-appearing or diaphoretic.   HENT:      Head: Normocephalic and atraumatic.      Right Ear: External ear normal.      Left Ear: External ear normal.      Nose: Nose normal.      Mouth/Throat:      Mouth: Mucous membranes are moist.   Eyes:      General: No scleral icterus.     Conjunctiva/sclera: Conjunctivae normal.   Cardiovascular:      Rate and Rhythm: Normal rate and regular rhythm.      Pulses: Normal pulses.      Heart sounds: No murmur heard.    No gallop.   Pulmonary:      Effort:  Pulmonary effort is normal.      Breath sounds: Rales (bibasilar mild) present. No wheezing.   Abdominal:      General: Bowel sounds are normal. There is no distension.      Palpations: Abdomen is soft.      Tenderness: There is no abdominal tenderness. There is no guarding.   Musculoskeletal:      Right lower leg: Edema present.      Left lower leg: Edema present.      Comments: Mild edema at the ankles   Skin:     General: Skin is warm and dry.      Coloration: Skin is not jaundiced.      Findings: No erythema.   Neurological:      Mental Status: She is alert and oriented to person, place, and time. Mental status is at baseline.   Psychiatric:         Mood and Affect: Mood normal.         Behavior: Behavior normal.       Significant Labs: All pertinent labs within the past 24 hours have been reviewed.  CBC:   Recent Labs   Lab 03/27/22  0638 03/27/22 2047 03/28/22 0628   WBC 6.30 7.05 4.99   HGB 13.1 14.2 13.6   HCT 40.4 43.4 41.3    233 192     CMP:   Recent Labs   Lab 03/27/22  0638 03/27/22 2047 03/28/22 0628    144 144   K 3.6 3.7 3.4*    109 107   CO2 23 23 24   * 147* 113*   BUN 16 18 20   CREATININE 0.7 0.8 0.7   CALCIUM 9.0 9.0 9.1   PROT  --  7.2 6.7   ALBUMIN  --  3.2* 2.9*   BILITOT  --  0.8 0.9   ALKPHOS  --  76 76   AST  --  20 19   ALT  --  21 21   ANIONGAP 10 12 13   EGFRNONAA >60.0 >60.0 >60.0     Cardiac Markers:   Recent Labs   Lab 03/27/22 2047   *       Significant Imaging: I have reviewed all pertinent imaging results/findings within the past 24 hours.  X-Ray Chest AP Portable   ED Interpretation   CHF bilateral infiltrates      Final Result   Abnormal      1. Increasing congestive heart failure small bilateral pleural effusions.   2. Pacemaker.   This report was flagged in Epic as abnormal.         Electronically signed by: Nick Bradley   Date:    03/28/2022   Time:    07:28

## 2022-03-28 NOTE — HPI
History of Present Illness:  Patient is a 66 y.o. female who has a past medical history of Coronary artery disease, Diabetes mellitus, Hyperlipidemia, Hypertension, MI (myocardial infarction), Neuropathy, Plantar fasciitis, and Thyroid disease presented with dyspnea on exertion and shortness of breath. She was recently discharged from the hospital after being admitted and treated for decompensated heart failure. After discharge patient states she went home and noticed that her ankles were swollen more than when she left the hospital. She was prescribed lasix at discharge and took her medications however she did not notice that she urinated much with the pills. She presented to ED and was found to be tachypneic and hypoxic with oxygen sats around 87% on RA. Chest xray with bilateral opacities similar to previous. BNP at baseline. She is not on home oxygen. She was treated with IV lasix in ED and placed on supplemental oxygen. She states she feels better and her oxygenation improved to 96%. Patient currently denies any fever/chills, chest pain, weakness, numbness, abdominal pain, nausea/vomiting, dysuria/hematuria, or weight loss.

## 2022-03-28 NOTE — ASSESSMENT & PLAN NOTE
BP Readings from Last 3 Encounters:   03/28/22 (!) 145/67   03/27/22 (!) 147/71   03/26/22 (!) 153/88     Continuing home medications as prescribed  Will cont to monitor and adjust as needed  Will utilize p.r.n. blood pressure medication only if patient's blood pressure greater than 180/110 and she develops symptoms such as worsening chest pain or shortness of breath.  Cardiac diet    Cardiac/Autonomic (From admission, onward)            Start     Stop Route Frequency Ordered    03/28/22 0900  amiodarone tablet 200 mg         -- Oral Daily 03/27/22 2150 03/27/22 2200  metoprolol tartrate (LOPRESSOR) tablet 25 mg         -- Oral 2 times daily 03/27/22 2150 03/27/22 2200  atorvastatin tablet 80 mg         -- Oral Nightly 03/27/22 2150

## 2022-03-28 NOTE — NURSING
Patient AAOX4, respirations even and unlabored. patient refused bed alarm, educated patient on falls and safety verbalizes understanding. charge nuse and MD aware. Call light within reach, bed in lowest position, wheels locked. Will continue to monitor the patient.

## 2022-03-28 NOTE — SUBJECTIVE & OBJECTIVE
Past Medical History:   Diagnosis Date    Coronary artery disease     Diabetes mellitus     Hyperlipidemia     Hypertension     MI (myocardial infarction)     Neuropathy     Plantar fasciitis     Thyroid disease        Past Surgical History:   Procedure Laterality Date    A-V CARDIAC PACEMAKER INSERTION      HYSTERECTOMY      INSERTION OF PACEMAKER      REMOVAL OF IMPLANTED CARDIOVERTER-DEFIBRILLATOR (ICD)         Review of patient's allergies indicates:   Allergen Reactions    Demerol [meperidine]     Diclofenac      Itching., GI issues.        Current Facility-Administered Medications on File Prior to Encounter   Medication    [DISCONTINUED] acetaminophen tablet 650 mg    [DISCONTINUED] albuterol-ipratropium 2.5 mg-0.5 mg/3 mL nebulizer solution 3 mL    [DISCONTINUED] aluminum-magnesium hydroxide-simethicone 200-200-20 mg/5 mL suspension 30 mL    [DISCONTINUED] amiodarone tablet 200 mg    [DISCONTINUED] aspirin EC tablet 81 mg    [DISCONTINUED] atorvastatin tablet 80 mg    [DISCONTINUED] buPROPion TBSR 12 hr tablet 150 mg    [DISCONTINUED] dextrose 50% injection 12.5 g    [DISCONTINUED] dextrose 50% injection 25 g    [DISCONTINUED] enoxaparin injection 40 mg    [DISCONTINUED] furosemide injection 40 mg    [DISCONTINUED] gabapentin capsule 300 mg    [DISCONTINUED] glucagon (human recombinant) injection 1 mg    [DISCONTINUED] glucose chewable tablet 16 g    [DISCONTINUED] glucose chewable tablet 24 g    [DISCONTINUED] HYDROcodone-acetaminophen  mg per tablet 1 tablet    [DISCONTINUED] levothyroxine tablet 50 mcg    [DISCONTINUED] magnesium oxide tablet 800 mg    [DISCONTINUED] magnesium oxide tablet 800 mg    [DISCONTINUED] metoprolol tartrate (LOPRESSOR) tablet 25 mg    [DISCONTINUED] morphine injection 2 mg    [DISCONTINUED] naloxone 0.4 mg/mL injection 0.02 mg    [DISCONTINUED] nitroGLYCERIN SL tablet 0.4 mg    [DISCONTINUED] ondansetron injection 4 mg    [DISCONTINUED] pantoprazole EC tablet 40 mg     [DISCONTINUED] polyethylene glycol packet 17 g    [DISCONTINUED] potassium, sodium phosphates 280-160-250 mg packet 2 packet    [DISCONTINUED] potassium, sodium phosphates 280-160-250 mg packet 2 packet    [DISCONTINUED] potassium, sodium phosphates 280-160-250 mg packet 2 packet    [DISCONTINUED] promethazine tablet 25 mg    [DISCONTINUED] sodium chloride 0.9% flush 10 mL    [DISCONTINUED] sucralfate 100 mg/mL suspension 1 g    [DISCONTINUED] trazodone split tablet 25 mg     Current Outpatient Medications on File Prior to Encounter   Medication Sig    albuterol (PROVENTIL/VENTOLIN HFA) 90 mcg/actuation inhaler Inhale 1-2 puffs into the lungs every 4 (four) hours as needed for Wheezing or Shortness of Breath. Rescue    amiodarone (PACERONE) 200 MG Tab Take by mouth once daily.    aspirin (ECOTRIN) 81 MG EC tablet Take 81 mg by mouth once daily.    buPROPion (WELLBUTRIN XL) 150 MG TB24 tablet   1 tab, Oral, every 24 hours, # 90 tab, 3 Refill(s), Pharmacy: [id number], 173, cm, 03/16/21 13:29:00 CDT, Height/Length Measured, 98.4, kg, 01/26/21 14:36:00 CST, Weight Dosing    empagliflozin (JARDIANCE) 25 mg tablet Take 25 mg by mouth once daily.    ezetimibe (ZETIA) 10 mg tablet   See Instructions, Take 1 tablet by mouth once daily for 90 days, # 90 tab, 1 Refill(s), Pharmacy: [id number], 173, cm, 11/23/21 10:41:00 CST, Height/Length Measured, 104.5, kg, 11/23/21 10:41:00 CST, Weight Dosing    furosemide (LASIX) 20 MG tablet 20 mg.    furosemide (LASIX) 20 MG tablet Take 1 tablet (20 mg total) by mouth 2 (two) times daily. for 5 days    gabapentin (NEURONTIN) 300 MG capsule Take 300 mg by mouth once daily.    levothyroxine (SYNTHROID) 50 MCG tablet   = 1 tab, Oral, Daily, # 60 tab, 5 Refill(s), Pharmacy: [id number], 173, cm, 10/07/21 16:04:00 CDT, Height/Length Measured, 104.8, kg, 10/07/21 16:04:00 CDT, Weight Dosing    meloxicam (MOBIC) 15 MG tablet Take 1 tablet (15 mg total) by mouth  once daily. for 14 days    metoprolol tartrate (LOPRESSOR) 25 MG tablet Take 25 mg by mouth 2 (two) times daily.    nitroGLYCERIN (NITROSTAT) 0.4 MG SL tablet SMARTSI Tablet(s) Sublingual PRN    potassium chloride SA (K-DUR,KLOR-CON) 20 MEQ tablet Take 1 tablet (20 mEq total) by mouth once daily. for 5 days    rosuvastatin (CRESTOR) 40 MG Tab Take 40 mg by mouth once daily.    sacubitriL-valsartan (ENTRESTO) 49-51 mg per tablet   1 tab, Oral, BID, # 60 tab, 0 Refill(s), Pharmacy: [id number], 173, cm, 21 10:41:00 CST, Height/Length Measured, 104.5, kg, 21 10:41:00 CST, Weight Dosing    hyoscyamine (ANASPAZ,LEVSIN) 0.125 mg Tab Take 1 tablet (125 mcg total) by mouth every 4 (four) hours as needed (abdominal cramping).    ondansetron (ZOFRAN-ODT) 8 MG TbDL 8 mg.     Family History    None       Tobacco Use    Smoking status: Never Smoker    Smokeless tobacco: Never Used   Substance and Sexual Activity    Alcohol use: Yes     Comment: occ    Drug use: Never    Sexual activity: Yes     Birth control/protection: See Surgical Hx     Review of Systems   Constitutional:  Positive for activity change and fatigue. Negative for chills and fever.   HENT:  Negative for congestion, facial swelling, hearing loss and trouble swallowing.    Eyes:  Negative for photophobia and visual disturbance.   Respiratory:  Positive for shortness of breath. Negative for chest tightness and wheezing.    Cardiovascular:  Negative for chest pain, palpitations and leg swelling.   Gastrointestinal:  Negative for abdominal pain, blood in stool, constipation, diarrhea, nausea and vomiting.   Endocrine: Negative.    Genitourinary: Negative.    Musculoskeletal:  Negative for back pain, joint swelling and myalgias.   Skin: Negative.    Allergic/Immunologic: Negative.    Neurological:  Negative for dizziness, facial asymmetry, speech difficulty, weakness and numbness.   Hematological: Negative.    Psychiatric/Behavioral:  Negative  for agitation, confusion and dysphoric mood. The patient is not nervous/anxious.    Objective:     Vital Signs (Most Recent):  Temp: 98.5 °F (36.9 °C) (03/27/22 2038)  Pulse: 63 (03/27/22 2130)  Resp: (!) 22 (03/27/22 2130)  BP: (!) 143/76 (03/27/22 2130)  SpO2: 95 % (03/27/22 2130)   Vital Signs (24h Range):  Temp:  [96.9 °F (36.1 °C)-98.9 °F (37.2 °C)] 98.5 °F (36.9 °C)  Pulse:  [61-74] 63  Resp:  [17-27] 22  SpO2:  [87 %-96 %] 95 %  BP: (128-162)/(65-93) 143/76     Weight: 102.1 kg (225 lb)  Body mass index is 34.21 kg/m².    Physical Exam  Vitals and nursing note reviewed.   Constitutional:       General: She is awake. She is not in acute distress.     Appearance: Normal appearance. She is well-developed and well-groomed. She is obese. She is not ill-appearing.      Interventions: Nasal cannula in place.   HENT:      Head: Normocephalic and atraumatic.   Eyes:      General: No scleral icterus.  Cardiovascular:      Rate and Rhythm: Normal rate.   Pulmonary:      Effort: No respiratory distress.   Musculoskeletal:      Right lower leg: No edema.      Left lower leg: No edema.   Skin:     Coloration: Skin is not jaundiced.   Neurological:      General: No focal deficit present.      Mental Status: She is alert and oriented to person, place, and time. Mental status is at baseline.   Psychiatric:         Mood and Affect: Mood normal.         Behavior: Behavior normal. Behavior is cooperative.         Thought Content: Thought content normal.         Judgment: Judgment normal.           Significant Labs: All pertinent labs within the past 24 hours have been reviewed.    Significant Imaging: I have reviewed all pertinent imaging results/findings within the past 24 hours.

## 2022-03-28 NOTE — ASSESSMENT & PLAN NOTE
No results found for this or any previous visit.    Recent Labs   Lab 03/27/22 2047   *     Chest Xray: with bilateral pulmonary edema  Continue IV Lasix  Cont BB, amiodarone  Check d-dimer  Cont to monitor I/O's and daily weights.  Fluid restriction (1.5 liters/24 hours)  Low Na diet  Monitor for signs of fluid overload: RR>30, O2 sat<92%, weight gain of >3 lbs, or urinary output <160ml/8hr  Maintain oxygen sats >92% via NC if supplemental oxygen needed.   Cardiology consulted    Patient Vitals for the past 72 hrs (Last 3 readings):   Weight   03/27/22 2245 100.5 kg (221 lb 9 oz)   03/27/22 2026 102.1 kg (225 lb)

## 2022-03-28 NOTE — ASSESSMENT & PLAN NOTE
Patient with known CAD   Will continue ASA and Statin  Monitor for S/Sx of angina/ACS.   Continue to monitor on telemetry.

## 2022-03-28 NOTE — ASSESSMENT & PLAN NOTE
No results found for: TSH, V9OILEG, I3HRESZ, THYROIDAB, FREET4  Patient has chronic hypothyroidism.   Cont with current dose of levothyroxine to treat   Repeat TSH as outpatient with PCP

## 2022-03-28 NOTE — PLAN OF CARE
Problem: Adult Inpatient Plan of Care  Goal: Plan of Care Review  Outcome: Ongoing, Progressing  Flowsheets (Taken 3/28/2022 0006)  Plan of Care Reviewed With: patient  Goal: Patient-Specific Goal (Individualized)  Outcome: Ongoing, Progressing  Goal: Absence of Hospital-Acquired Illness or Injury  Outcome: Ongoing, Progressing  Intervention: Identify and Manage Fall Risk  Flowsheets (Taken 3/28/2022 0006)  Safety Promotion/Fall Prevention:   side rails raised x 2   instructed to call staff for mobility  Intervention: Prevent Skin Injury  Flowsheets (Taken 3/28/2022 0006)  Body Position: supine  Skin Protection: adhesive use limited  Intervention: Prevent and Manage VTE (Venous Thromboembolism) Risk  Flowsheets (Taken 3/28/2022 0006)  Activity Management: Ambulated to bathroom - L4  VTE Prevention/Management: bleeding risk assessed  Range of Motion: active ROM (range of motion) encouraged  Intervention: Prevent Infection  Flowsheets (Taken 3/28/2022 0006)  Infection Prevention: rest/sleep promoted  Goal: Optimal Comfort and Wellbeing  Outcome: Ongoing, Progressing  Intervention: Monitor Pain and Promote Comfort  Flowsheets (Taken 3/28/2022 0006)  Pain Management Interventions: care clustered  Intervention: Provide Person-Centered Care  Flowsheets (Taken 3/28/2022 0006)  Trust Relationship/Rapport:   care explained   choices provided   emotional support provided   empathic listening provided   questions answered   questions encouraged   reassurance provided   thoughts/feelings acknowledged  Goal: Readiness for Transition of Care  Outcome: Ongoing, Progressing  Intervention: Mutually Develop Transition Plan  Flowsheets (Taken 3/28/2022 0006)  Equipment Currently Used at Home: none  Transportation Anticipated: family or friend will provide  Communicated LEON with patient/caregiver: Yes  Do you expect to return to your current living situation?: Yes  Do you have help at home or someone to help you manage your care at  home?: Yes  Readmission within 30 days?: No     Problem: Infection  Goal: Absence of Infection Signs and Symptoms  Outcome: Ongoing, Progressing  Intervention: Prevent or Manage Infection  Flowsheets (Taken 3/28/2022 0006)  Infection Management: aseptic technique maintained     Problem: Diabetes Comorbidity  Goal: Blood Glucose Level Within Targeted Range  Outcome: Ongoing, Progressing  Intervention: Monitor and Manage Glycemia  Flowsheets (Taken 3/28/2022 0006)  Glycemic Management: blood glucose monitored     Problem: Fall Injury Risk  Goal: Absence of Fall and Fall-Related Injury  Outcome: Ongoing, Progressing  Intervention: Identify and Manage Contributors  Flowsheets (Taken 3/28/2022 0006)  Self-Care Promotion:   independence encouraged   BADL personal objects within reach  Medication Review/Management: medications reviewed  Intervention: Promote Injury-Free Environment  Flowsheets (Taken 3/28/2022 0006)  Safety Promotion/Fall Prevention:   side rails raised x 2   instructed to call staff for mobility     Problem: Adjustment to Illness (Heart Failure)  Goal: Optimal Coping  Outcome: Ongoing, Progressing  Intervention: Support Psychosocial Response  Flowsheets (Taken 3/28/2022 0006)  Supportive Measures:   active listening utilized   verbalization of feelings encouraged  Family/Support System Care:   involvement promoted   presence promoted     Problem: Cardiac Output Decreased (Heart Failure)  Goal: Optimal Cardiac Output  Outcome: Ongoing, Progressing  Intervention: Optimize Cardiac Output  Flowsheets (Taken 3/28/2022 0006)  Stabilization Measures: airway opened  Environmental Support: calm environment promoted     Problem: Dysrhythmia (Heart Failure)  Goal: Stable Heart Rate and Rhythm  Outcome: Ongoing, Progressing     Problem: Fluid Imbalance (Heart Failure)  Goal: Fluid Balance  Outcome: Ongoing, Progressing  Intervention: Monitor and Manage Fluid Balance  Flowsheets (Taken 3/28/2022  0006)  Fluid/Electrolyte Management: fluids restricted     Problem: Functional Ability Impaired (Heart Failure)  Goal: Optimal Functional Ability  Outcome: Ongoing, Progressing  Intervention: Optimize Functional Ability  Flowsheets (Taken 3/28/2022 0006)  Self-Care Promotion:   independence encouraged   BADL personal objects within reach  Activity Management: Ambulated to bathroom - L4     Problem: Oral Intake Inadequate (Heart Failure)  Goal: Optimal Nutrition Intake  Outcome: Ongoing, Progressing  Intervention: Promote and Optimize Nutrition Intake  Flowsheets (Taken 3/28/2022 0006)  Oral Nutrition Promotion: rest periods promoted     Problem: Sleep Disordered Breathing (Heart Failure)  Goal: Effective Breathing Pattern During Sleep  Outcome: Ongoing, Progressing  Intervention: Monitor and Manage Obstructive Sleep Apnea  Flowsheets (Taken 3/28/2022 0006)  NPPV/CPAP Maintenance: nasal prongs adjusted   POC reviewed at bedside. Questions and concerns addressed. VSS. Placed bed in low and locked position. Call light within reach. Side rails up x2. Instructed to call for any needs. Verbalized understanding of all instructions. Frequent rounds.

## 2022-03-28 NOTE — ED PROVIDER NOTES
Encounter Date: 3/27/2022       History     Chief Complaint   Patient presents with    Shortness of Breath    Tachycardia     The patient presents with good shortness of breast this p.m..  The patient was discharged from the hospital earlier today with complaints of decompensated congestive heart failure.  She was given IV Lasix in the last dose this morning with good results.  When she got home she got more short of breath presented to the emergency room with a oxygen saturation of 87%.  She does not have oxygen at home.  She denies any chest pain fever chills.        Review of patient's allergies indicates:   Allergen Reactions    Demerol [meperidine]     Diclofenac      Itching., GI issues.      Past Medical History:   Diagnosis Date    Coronary artery disease     Diabetes mellitus     Hyperlipidemia     Hypertension     MI (myocardial infarction)     Neuropathy     Plantar fasciitis     Thyroid disease      Past Surgical History:   Procedure Laterality Date    A-V CARDIAC PACEMAKER INSERTION      HYSTERECTOMY      INSERTION OF PACEMAKER      REMOVAL OF IMPLANTED CARDIOVERTER-DEFIBRILLATOR (ICD)       History reviewed. No pertinent family history.  Social History     Tobacco Use    Smoking status: Never Smoker    Smokeless tobacco: Never Used   Substance Use Topics    Alcohol use: Yes     Comment: occ    Drug use: Never     Review of Systems   Constitutional: Negative for fever.   HENT: Negative for sore throat.    Respiratory: Negative for shortness of breath.    Cardiovascular: Negative for chest pain.   Gastrointestinal: Negative for nausea.   Genitourinary: Negative for dysuria.   Musculoskeletal: Negative for back pain.   Skin: Negative for rash.   Neurological: Negative for weakness.   Hematological: Does not bruise/bleed easily.       Physical Exam     Initial Vitals   BP Pulse Resp Temp SpO2   03/27/22 2030 03/27/22 2030 03/27/22 2025 03/27/22 2038 03/27/22 2025   (!) 128/93 74 (!) 27  98.5 °F (36.9 °C) (!) 87 %      MAP       --                Physical Exam    Vitals reviewed.  Constitutional: She appears well-developed.   Eyes: Pupils are equal, round, and reactive to light.   Neck:   Normal range of motion.  Cardiovascular: Normal rate.   Pulmonary/Chest: She has rhonchi. She has rales.   Abdominal: Abdomen is soft.   Musculoskeletal:         General: Normal range of motion.      Cervical back: Normal range of motion.     Neurological: She is alert. She has normal strength and normal reflexes. No cranial nerve deficit. GCS eye subscore is 4. GCS verbal subscore is 5. GCS motor subscore is 6.   Skin: Skin is warm.   Psychiatric: She has a normal mood and affect.         ED Course   Procedures  Labs Reviewed   COMPREHENSIVE METABOLIC PANEL - Abnormal; Notable for the following components:       Result Value    Glucose 147 (*)     Albumin 3.2 (*)     All other components within normal limits   B-TYPE NATRIURETIC PEPTIDE - Abnormal; Notable for the following components:     (*)     All other components within normal limits   CBC W/ AUTO DIFFERENTIAL   TROPONIN I        ECG Results          EKG 12-lead (Preliminary result)  Result time 03/27/22 20:44:11    ED Interpretation by Jonathan Hopper MD (03/27/22 20:44:11, [facility], Emergency Medicine)    Normal sinus rhythm with rate of 71 and no acute ischemic changes                            Imaging Results          X-Ray Chest AP Portable (Preliminary result)  Result time 03/27/22 20:59:52    ED Interpretation by Jonathan Hopper MD (03/27/22 20:59:52, [facility] Dept, Emergency Medicine)    CHF bilateral infiltrates                               Medications   furosemide injection 40 mg (40 mg Intravenous Given 3/27/22 2116)                 ED Course as of 03/27/22 2147   Sun Mar 27, 2022   2059 X-Ray Chest AP Portable [PW]   2106 CBC auto differential [PW]   2118 Comprehensive metabolic panel(!) [PW]   2140 BNP(!): 728  [PW]   2141 Troponin I: 0.006 [PW]      ED Course User Index  [PW] Jonathan Hopper MD             Clinical Impression:   Final diagnoses:  [R06.02] Shortness of breath  [I50.43] Acute on chronic combined systolic and diastolic CHF (congestive heart failure) (Primary)          ED Disposition Condition    Observation               Jonathan Hopper MD  03/27/22 2147

## 2022-03-28 NOTE — CONSULTS
CARDIOLOGY CONSULTATION    Patient Name:  Antonia Polk    :  1955    Medical Record:  [id number]    DATE OF SERVICE: 3/28/2022    ATTENDING PHYSICIAN: Jose Hodges MD    REASON FOR CONSULT:     HISTORY OF PRESENT ILLNESS  The patient is a 66 y.o. y/o female who is hospitalized for Acute on chronic combined systolic and diastolic congestive heart failure.  States possibly 1 have weeks ago had been on a diet bland diet with high in sodium seen in hospital 1 day ago shortness of breath given Lasix and improved swelling improved subsequent readmitted shortness of breath improved at this time no chest pain patient says she has a history of MI approximately  stent x2 at that time states she was recently catheterization 1 year ago medical treatment at that time had AICD fire proximally 2 years ago and has been on amiodarone 200 mg daily history of AICD pacer hyperlipidemia cardiology consult for CHF      PAST MEDICAL HISTORY  Past Medical History:   Diagnosis Date    Coronary artery disease     Diabetes mellitus     Hyperlipidemia     Hypertension     MI (myocardial infarction)     Neuropathy     Plantar fasciitis     Thyroid disease        PAST SURGICAL HISTORY  Past Surgical History:   Procedure Laterality Date    A-V CARDIAC PACEMAKER INSERTION      HYSTERECTOMY      INSERTION OF PACEMAKER      REMOVAL OF IMPLANTED CARDIOVERTER-DEFIBRILLATOR (ICD)         SOCIAL HISTORY   reports that she has never smoked. She has never used smokeless tobacco. She reports current alcohol use. She reports that she does not use drugs.      FAMILY HISTORY  History reviewed. No pertinent family history.      ALLERGIES   Review of patient's allergies indicates:   Allergen Reactions    Demerol [meperidine]     Diclofenac      Itching., GI issues.        HOME  MEDICATIONS  Prior to Admission medications    Medication Sig Start Date End Date Taking? Authorizing Provider   albuterol (PROVENTIL/VENTOLIN HFA) 90  mcg/actuation inhaler Inhale 1-2 puffs into the lungs every 4 (four) hours as needed for Wheezing or Shortness of Breath. Rescue 21 Yes Pedro Luis Block MD   amiodarone (PACERONE) 200 MG Tab Take by mouth once daily.   Yes Historical Provider   aspirin (ECOTRIN) 81 MG EC tablet Take 81 mg by mouth once daily.   Yes Historical Provider   buPROPion (WELLBUTRIN XL) 150 MG TB24 tablet   1 tab, Oral, every 24 hours, # 90 tab, 3 Refill(s), Pharmacy: [id number], 173, cm, 21 13:29:00 CDT, Height/Length Measured, 98.4, kg, 21 14:36:00 CST, Weight Dosing 5/10/21  Yes Historical Provider   empagliflozin (JARDIANCE) 25 mg tablet Take 25 mg by mouth once daily.   Yes Historical Provider   ezetimibe (ZETIA) 10 mg tablet   See Instructions, Take 1 tablet by mouth once daily for 90 days, # 90 tab, 1 Refill(s), Pharmacy: [id number], 173, cm, 21 10:41:00 CST, Height/Length Measured, 104.5, kg, 21 10:41:00 CST, Weight Dosing 22  Yes Historical Provider   furosemide (LASIX) 20 MG tablet 20 mg. 10/7/21  Yes Historical Provider   furosemide (LASIX) 20 MG tablet Take 1 tablet (20 mg total) by mouth 2 (two) times daily. for 5 days 3/26/22 3/31/22 Yes Ricardo Alcantar MD   gabapentin (NEURONTIN) 300 MG capsule Take 300 mg by mouth once daily.   Yes Historical Provider   levothyroxine (SYNTHROID) 50 MCG tablet   = 1 tab, Oral, Daily, # 60 tab, 5 Refill(s), Pharmacy: [id number], 173, cm, 10/07/21 16:04:00 CDT, Height/Length Measured, 104.8, kg, 10/07/21 16:04:00 CDT, Weight Dosing 10/27/21  Yes Historical Provider   meloxicam (MOBIC) 15 MG tablet Take 1 tablet (15 mg total) by mouth once daily. for 14 days 3/14/22 3/28/22 Yes Tad Grewal DPM   metoprolol tartrate (LOPRESSOR) 25 MG tablet Take 25 mg by mouth 2 (two) times daily.   Yes Historical Provider   nitroGLYCERIN (NITROSTAT) 0.4 MG SL tablet SMARTSI Tablet(s) Sublingual PRN 22  Yes Historical  Provider   potassium chloride SA (K-DUR,KLOR-CON) 20 MEQ tablet Take 1 tablet (20 mEq total) by mouth once daily. for 5 days 3/26/22 3/31/22 Yes Ricardo Alcantar MD   rosuvastatin (CRESTOR) 40 MG Tab Take 40 mg by mouth once daily. 1/10/22  Yes Historical Provider   sacubitriL-valsartan (ENTRESTO) 49-51 mg per tablet   1 tab, Oral, BID, # 60 tab, 0 Refill(s), Pharmacy: [id number], 173, cm, 11/23/21 10:41:00 CST, Height/Length Measured, 104.5, kg, 11/23/21 10:41:00 CST, Weight Dosing 12/6/21  Yes Historical Provider   hyoscyamine (ANASPAZ,LEVSIN) 0.125 mg Tab Take 1 tablet (125 mcg total) by mouth every 4 (four) hours as needed (abdominal cramping). 1/7/21 2/6/21  Pedro Luis Block MD   ondansetron (ZOFRAN-ODT) 8 MG TbDL 8 mg. 10/7/21   Historical Provider     CURRENT MEDICATIONS   amiodarone  200 mg Oral Daily    aspirin  81 mg Oral Daily    atorvastatin  80 mg Oral QHS    buPROPion  150 mg Oral BID    enoxaparin  40 mg Subcutaneous Daily    furosemide (LASIX) injection  40 mg Intravenous Daily    gabapentin  300 mg Oral QHS    levothyroxine  50 mcg Oral Before breakfast    metoprolol tartrate  25 mg Oral BID    pantoprazole  40 mg Oral Daily     acetaminophen, acetaminophen, albuterol-ipratropium, aluminum-magnesium hydroxide-simethicone, dextrose 50%, dextrose 50%, glucagon (human recombinant), glucose, glucose, insulin aspart U-100, naloxone, ondansetron, prochlorperazine, simethicone, trazodone      REVIEW OF SYSTEMS  General: no weight loss, no fever, no chills, no anorexia  Skin: no rash  Eyes: no blurry vision  Ears/Nose/Throat: no nasal congestion, no sore throat  Respiratory: no cough, + dyspnea, no wheezing  Cardiovascular: no chest pain, + ankle swelling  Gastrointestinal: no nausea, no vomiting, no diarrhea, no constipation, no abdominal pain. No melena, no bright red blood per rectum  Genitourinary: no dysuria, no hematuria  Musculoskeletal: no joint pain, no myalgia, no muscle  weakness  Neurologic: no seizures, no tremors, no fainting  Hematologic/Lymphatic: no abnormal bleeding, no abnormal bruising  Endocrine: no heat or cold intolerance, no polydipsia, no polyuria  Psychiatric: no anxiety, no depression  Allergy/Immunology: no seasonal allergies, no joint stiffness in morning      PHYSICAL EXAM  Vital Signs:  Temp:  [96.4 °F (35.8 °C)-98.9 °F (37.2 °C)] 96.4 °F (35.8 °C)  Pulse:  [55-74] 56  Resp:  [17-27] 18  SpO2:  [69 %-96 %] 93 %  BP: (116-172)/(58-93) 145/67  Constitutional:  Healthy, no distress  HENT:  Normocephalic, Atraumatic, Bilateral external ears normal, Oropharynx moist, No oral exudates, No tenderness, neck supple.  Eyes:  PERRL, EOMI, Conjunctiva normal, No discharge.   Lymphatic:  No cervical or supraclavicular lymphadenopathy noted.   Cardiovascular:  Normal heart rate, Normal rhythm, S3 2/6 systolic ejection murmur left sternal border  Respiratory:  Normal breath sounds, No respiratory distress, No wheezing, No rhonchi, + rare rales, No chest tenderness.   GI:  Bowel sounds normal, Soft, No tenderness, No rebound or guarding, No masses.   Integument:  Warm, Dry, No erythema, No rash.   Musculoskeletal/Extremities:  Intact distal pulses, No edema, No tenderness, No cyanosis, No clubbing. Good range of motion in all major joints.  Trace pedal edema   Neurologic:  Alert & oriented x 3, cranial nerves grossly intact, No focal deficits.    LABS    CBC  Recent Labs   Lab 03/27/22  0638 03/27/22 2047 03/28/22 0628   WBC 6.30 7.05 4.99   RBC 4.23 4.62 4.38   HGB 13.1 14.2 13.6   HCT 40.4 43.4 41.3   MCV 96 94 94   MCH 31.0 30.7 31.1*   MCHC 32.4 32.7 32.9   RDW 13.2 13.2 13.0   MPV 9.3 9.6 9.1*    233 192       Chemistry  Recent Labs   Lab 03/26/22  0813 03/27/22  0638 03/27/22 2047 03/28/22 0628    143 144 144   K 3.7 3.6 3.7 3.4*    110 109 107   CO2 22* 23 23 24   BUN 16 16 18 20   CREATININE 0.7 0.7 0.8 0.7   * 117* 147* 113*   PROT 6.5  --   7.2 6.7   CALCIUM 8.7 9.0 9.0 9.1   BILITOT 0.8  --  0.8 0.9   AST 23  --  20 19   ALT 23  --  21 21       ABG  No results for input(s): PHART, MJI8STH, PO2ART, XDS6WUZ, Q0EQSRVF, BEART, T8ZFJENT in the last 168 hours.    IMAGING STUDIES & OTHER STUDIES  Reviewed          ASSESSMENT  Active Hospital Problems    Diagnosis  POA    *Acute on chronic combined systolic and diastolic congestive heart failure [I50.43]  Yes    Hyperlipidemia [E78.5]  Yes    Hypertension [I10]  Yes    Coronary artery disease involving native coronary artery of native heart without angina pectoris [I25.10]  Yes    Hypothyroid [E03.9]  Yes    Type 2 diabetes mellitus without complication, without long-term current use of insulin [E11.9]  Yes      Resolved Hospital Problems   No resolved problems to display.       PLAN    Acute on chronic congestive heart  [place] Heart  Association Class 3   Recent increased salt intake 1/2 weeks ago    Rate mid 1 day ago    Coronary artery disease  No chest pain  Enzyme negative  Anterior MI undetermined age  MI 2005  Stent x2 2005  Catheterization patient report 2021 medical treatment    AICD  States AICD fired 2 years ago  Amiodarone 200 mg daily    Hyperlipidemia  On statin    Hypokalemia  K 3.4    Plan  Continue diuresis  Repeat potassium  Echo evaluation LV function  Spironolactone 25 mg daily  States recent leg swelling now improved venous Doppler evaluate for DVT        The plan was discussed with the patient and/or family.    Thank you for the Consult.    Electronically signed by: Gabriel Myles, 3/28/2022 10:16 AM     Time spent on counseling/coordination of care:   Total time spent with patient:

## 2022-03-28 NOTE — PLAN OF CARE
03/28/22 1600   Discharge Assessment   Assessment Type Discharge Planning Assessment   Confirmed/corrected address, phone number and insurance Yes   Confirmed Demographics Correct on Facesheet   Source of Information patient   When was your last doctors appointment?   (Dr Ritchie in Feb)   Does patient/caregiver understand observation status Yes   Communicated LEON with patient/caregiver Date not available/Unable to determine   Reason For Admission shortness of breath   Lives With spouse   Do you expect to return to your current living situation? Yes   Do you have help at home or someone to help you manage your care at home? Yes   Who are your caregiver(s) and their phone number(s)? Teresa Polk spouse [phone]   Prior to hospitilization cognitive status: Alert/Oriented   Current cognitive status: Alert/Oriented   Walking or Climbing Stairs Difficulty none   Dressing/Bathing Difficulty none   Home Accessibility not wheelchair accessible   Home Layout Able to live on 1st floor   Equipment Currently Used at Home none   Readmission within 30 days? Yes   Patient currently being followed by outpatient case management? No   Do you currently have service(s) that help you manage your care at home? No   Do you take prescription medications? Yes   Do you have prescription coverage? Yes   Coverage BC   Do you have any problems affording any of your prescribed medications? No   Is the patient taking medications as prescribed? yes   Who is going to help you get home at discharge? Teresa Polk spouse [phone]   How do you get to doctors appointments? car, drives self   Are you on dialysis? No   Do you take coumadin? No   Discharge Plan A Home with family   DME Needed Upon Discharge  none   Discharge Plan discussed with: Patient;Spouse/sig other   Name(s) and Number(s) Teresa Polk spouse [phone]   Discharge Barriers Identified None   Relationship/Environment   Name(s) of Who Lives With Patient Teresa Polk spouse  [phone]   Patient lives at home with her spouse who is at bedside. She is independent at home. 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 She uses Dr Phillips for cardiologist as has hx of congestive heart failure. Her primary care is Dr Ritchie & last saw her in Feb. She is concerned about going home too soon as had to come to ER 3x in the last 2 days with same problem of shortness of breath. States feels fine when she is discharged but within 6-7hr becomes short of breath again. At time of discharge will see if she will qualify for oxygen. Denies any other needs at this time. Will continue to follow.

## 2022-03-28 NOTE — ASSESSMENT & PLAN NOTE
No results found for this or any previous visit.    Recent Labs   Lab 03/27/22 2047   *     Chest Xray: with bilateral pulmonary edema  Start IV Lasix  Cont BB  Cont to monitor I/O's and daily weights.  Fluid restriction (2 liters/24 hours)  Low Na diet  Monitor for signs of fluid overload: RR>30, O2 sat<92%, weight gain of >3 lbs, or urinary output <160ml/8hr  Maintain oxygen sats >92% via NC if supplemental oxygen needed.     Patient Vitals for the past 72 hrs (Last 3 readings):   Weight   03/27/22 2026 102.1 kg (225 lb)

## 2022-03-28 NOTE — PLAN OF CARE
Problem: Adult Inpatient Plan of Care  Goal: Patient-Specific Goal (Individualized)  Outcome: Ongoing, Progressing     Problem: Adult Inpatient Plan of Care  Goal: Absence of Hospital-Acquired Illness or Injury  Outcome: Ongoing, Progressing     Problem: Adult Inpatient Plan of Care  Goal: Readiness for Transition of Care  Outcome: Ongoing, Progressing     Problem: Infection  Goal: Absence of Infection Signs and Symptoms  Outcome: Ongoing, Progressing     Problem: Diabetes Comorbidity  Goal: Blood Glucose Level Within Targeted Range  Outcome: Ongoing, Progressing     Problem: Fall Injury Risk  Goal: Absence of Fall and Fall-Related Injury  Outcome: Ongoing, Progressing

## 2022-03-28 NOTE — PLAN OF CARE
03/28/22 1600   Readmission   Why were you hospitalized in the last 30 days? shortness of breath   Why were you readmitted? Alarmed about signs/symptoms   When you left the hospital how did you feel? felt ok   When you left the hospital where did you go? Home with Family   Did patient/caregiver refused recommended DC plan? No   Tell me about what happened between when you left the hospital and the day you returned. within 6-7hr of discharge became short of breath again   When did you start not feeling well? within 6-7hr of discharge became short of breath again   Did you try to manage your symptoms your self? Yes   What did you do? took lasix   Did you call anyone? No   Did you try to see or did see a doctor or nurse before you came? No   Did you have  a follow-up appointment on discharge? No  (admitted on Sat & discharged on Sun)   Was this a planned readmission? No

## 2022-03-28 NOTE — PROGRESS NOTES
[facility] Medicine  Progress Note    Patient Name: Antonia Polk  MRN: [id number]  Patient Class: OP- Observation   Admission Date: 3/27/2022  Length of Stay: 0 days  Attending Physician: Jose Hodges MD  Primary Care Provider: Jose Phillips MD        Subjective:     Principal Problem:Acute on chronic combined systolic and diastolic congestive heart failure        HPI:  History of Present Illness:  Patient is a 66 y.o. female who has a past medical history of Coronary artery disease, Diabetes mellitus, Hyperlipidemia, Hypertension, MI (myocardial infarction), Neuropathy, Plantar fasciitis, and Thyroid disease presented with dyspnea on exertion and shortness of breath. She was recently discharged from the hospital after being admitted and treated for decompensated heart failure. After discharge patient states she went home and noticed that her ankles were swollen more than when she left the hospital. She was prescribed lasix at discharge and took her medications however she did not notice that she urinated much with the pills. She presented to ED and was found to be tachypneic and hypoxic with oxygen sats around 87% on RA. Chest xray with bilateral opacities similar to previous. BNP at baseline. She is not on home oxygen. She was treated with IV lasix in ED and placed on supplemental oxygen. She states she feels better and her oxygenation improved to 96%. Patient currently denies any fever/chills, chest pain, weakness, numbness, abdominal pain, nausea/vomiting, dysuria/hematuria, or weight loss.       Overview/Hospital Course:  No notes on file    Interval History: still with shortness of breath, requiring supplemental oxygen    Review of Systems   Constitutional:  Positive for unexpected weight change. Negative for fatigue and fever.   HENT: Negative.     Eyes: Negative.    Respiratory:  Positive for shortness of breath. Negative for chest tightness and wheezing.    Cardiovascular:  Positive for  leg swelling. Negative for chest pain.        Dyspnea on exertion   Gastrointestinal:  Negative for abdominal pain.   Endocrine: Negative.    Genitourinary: Negative.    Musculoskeletal: Negative.    Skin: Negative.    Allergic/Immunologic: Negative.    Neurological: Negative.    Hematological: Negative.    Psychiatric/Behavioral: Negative.     Objective:     Vital Signs (Most Recent):  Temp: 96.4 °F (35.8 °C) (03/28/22 0730)  Pulse: (!) 56 (03/28/22 0832)  Resp: 18 (03/28/22 0730)  BP: (!) 145/67 (03/28/22 0832)  SpO2: (!) 93 % (03/28/22 0730)   Vital Signs (24h Range):  Temp:  [96.4 °F (35.8 °C)-98.9 °F (37.2 °C)] 96.4 °F (35.8 °C)  Pulse:  [55-74] 56  Resp:  [17-27] 18  SpO2:  [69 %-96 %] 93 %  BP: (116-172)/(58-93) 145/67     Weight: 100.5 kg (221 lb 9 oz)  Body mass index is 33.69 kg/m².    Intake/Output Summary (Last 24 hours) at 3/28/2022 1004  Last data filed at 3/28/2022 0500  Gross per 24 hour   Intake 360 ml   Output 400 ml   Net -40 ml      Physical Exam  Vitals reviewed.   Constitutional:       General: She is not in acute distress.     Appearance: She is not toxic-appearing or diaphoretic.   HENT:      Head: Normocephalic and atraumatic.      Right Ear: External ear normal.      Left Ear: External ear normal.      Nose: Nose normal.      Mouth/Throat:      Mouth: Mucous membranes are moist.   Eyes:      General: No scleral icterus.     Conjunctiva/sclera: Conjunctivae normal.   Cardiovascular:      Rate and Rhythm: Normal rate and regular rhythm.      Pulses: Normal pulses.      Heart sounds: No murmur heard.    No gallop.   Pulmonary:      Effort: Pulmonary effort is normal.      Breath sounds: Rales (bibasilar mild) present. No wheezing.   Abdominal:      General: Bowel sounds are normal. There is no distension.      Palpations: Abdomen is soft.      Tenderness: There is no abdominal tenderness. There is no guarding.   Musculoskeletal:      Right lower leg: Edema present.      Left lower leg: Edema  present.      Comments: Mild edema at the ankles   Skin:     General: Skin is warm and dry.      Coloration: Skin is not jaundiced.      Findings: No erythema.   Neurological:      Mental Status: She is alert and oriented to person, place, and time. Mental status is at baseline.   Psychiatric:         Mood and Affect: Mood normal.         Behavior: Behavior normal.       Significant Labs: All pertinent labs within the past 24 hours have been reviewed.  CBC:   Recent Labs   Lab 03/27/22 0638 03/27/22 2047 03/28/22 0628   WBC 6.30 7.05 4.99   HGB 13.1 14.2 13.6   HCT 40.4 43.4 41.3    233 192     CMP:   Recent Labs   Lab 03/27/22 0638 03/27/22 2047 03/28/22 0628    144 144   K 3.6 3.7 3.4*    109 107   CO2 23 23 24   * 147* 113*   BUN 16 18 20   CREATININE 0.7 0.8 0.7   CALCIUM 9.0 9.0 9.1   PROT  --  7.2 6.7   ALBUMIN  --  3.2* 2.9*   BILITOT  --  0.8 0.9   ALKPHOS  --  76 76   AST  --  20 19   ALT  --  21 21   ANIONGAP 10 12 13   EGFRNONAA >60.0 >60.0 >60.0     Cardiac Markers:   Recent Labs   Lab 03/27/22 2047   *       Significant Imaging: I have reviewed all pertinent imaging results/findings within the past 24 hours.  X-Ray Chest AP Portable   ED Interpretation   CHF bilateral infiltrates      Final Result   Abnormal      1. Increasing congestive heart failure small bilateral pleural effusions.   2. Pacemaker.   This report was flagged in Epic as abnormal.         Electronically signed by: Nick Bradley   Date:    03/28/2022   Time:    07:28              Assessment/Plan:      * Acute on chronic combined systolic and diastolic congestive heart failure  No results found for this or any previous visit.    Recent Labs   Lab 03/27/22 2047   *     Chest Xray: with bilateral pulmonary edema  Continue IV Lasix  Cont BB, amiodarone  Check d-dimer  Cont to monitor I/O's and daily weights.  Fluid restriction (1.5 liters/24 hours)  Low Na diet  Monitor for signs of fluid  overload: RR>30, O2 sat<92%, weight gain of >3 lbs, or urinary output <160ml/8hr  Maintain oxygen sats >92% via NC if supplemental oxygen needed.   Cardiology consulted    Patient Vitals for the past 72 hrs (Last 3 readings):   Weight   03/27/22 2245 100.5 kg (221 lb 9 oz)   03/27/22 2026 102.1 kg (225 lb)       Hypertension  BP Readings from Last 3 Encounters:   03/28/22 (!) 145/67   03/27/22 (!) 147/71   03/26/22 (!) 153/88     Continuing home medications as prescribed  Will cont to monitor and adjust as needed  Will utilize p.r.n. blood pressure medication only if patient's blood pressure greater than 180/110 and she develops symptoms such as worsening chest pain or shortness of breath.  Cardiac diet    Cardiac/Autonomic (From admission, onward)            Start     Stop Route Frequency Ordered    03/28/22 0900  amiodarone tablet 200 mg         -- Oral Daily 03/27/22 2150 03/27/22 2200  metoprolol tartrate (LOPRESSOR) tablet 25 mg         -- Oral 2 times daily 03/27/22 2150 03/27/22 2200  atorvastatin tablet 80 mg         -- Oral Nightly 03/27/22 2150          Hyperlipidemia  No results found for: LDLCALC   Patient is chronically on statin  Continue home medication  Monitor for acute changes      Hypothyroid    Patient has chronic hypothyroidism.   Cont with current dose of levothyroxine to treat   Repeat TSH as outpatient with PCP    Coronary artery disease involving native coronary artery of native heart without angina pectoris  Patient with known CAD   Will continue ASA and Statin  Monitor for S/Sx of angina/ACS.   Continue to monitor on telemetry.         Type 2 diabetes mellitus without complication, without long-term current use of insulin  No results found for: LABA1C, HGBA1C  Recent Labs   Lab 03/28/22  0728   POCTGLUCOSE 131*        Low dose correction scale   Cont blood glucose monitoring   BG goal:  Preprandial blood glucose target <140 mg/dL  Random glucoses <180 mg/dL  Avoid hypoglycemia -   Reduce antihyperglycemic therapy when caloric intake is reduced. Avoid insulin stacking as a result of repeated injection of prandial insulin at close intervals.   Avoid severe hyperglycemia  ADA diet  Antihyperglycemics (From admission, onward)            Start     Stop Route Frequency Ordered    03/27/22 2250  insulin aspart U-100 pen 0-5 Units         -- SubQ Before meals & nightly PRN 03/27/22 2150             VTE Risk Mitigation (From admission, onward)         Ordered     enoxaparin injection 40 mg  Daily         03/27/22 2150     IP VTE LOW RISK PATIENT  Once         03/27/22 2150                Discharge Planning   LEON:      Code Status: Full Code   Is the patient medically ready for discharge?:     Reason for patient still in hospital (select all that apply): Treatment                     Maine Carbone MD  Department of Hospital Medicine   [facility]

## 2022-03-28 NOTE — ASSESSMENT & PLAN NOTE
Patient has chronic hypothyroidism.   Cont with current dose of levothyroxine to treat   Repeat TSH as outpatient with PCP

## 2022-03-29 VITALS
BODY MASS INDEX: 33.49 KG/M2 | TEMPERATURE: 98 F | SYSTOLIC BLOOD PRESSURE: 136 MMHG | RESPIRATION RATE: 18 BRPM | HEIGHT: 68 IN | OXYGEN SATURATION: 94 % | DIASTOLIC BLOOD PRESSURE: 66 MMHG | HEART RATE: 56 BPM | WEIGHT: 221 LBS

## 2022-03-29 LAB
ALBUMIN SERPL BCP-MCNC: 3 G/DL (ref 3.5–5.2)
ALP SERPL-CCNC: 81 U/L (ref 55–135)
ALT SERPL W/O P-5'-P-CCNC: 23 U/L (ref 10–44)
ANION GAP SERPL CALC-SCNC: 10 MMOL/L (ref 8–16)
AORTIC ROOT ANNULUS: 2.67 CM
AORTIC VALVE CUSP SEPERATION: 1.75 CM
AST SERPL-CCNC: 29 U/L (ref 10–40)
AV INDEX (PROSTH): 0.77
AV MEAN GRADIENT: 4 MMHG
AV PEAK GRADIENT: 7 MMHG
AV VALVE AREA: 2.09 CM2
AV VELOCITY RATIO: 0.7
BASOPHILS # BLD AUTO: 0.04 K/UL (ref 0–0.2)
BASOPHILS NFR BLD: 0.7 % (ref 0–1.9)
BILIRUB SERPL-MCNC: 0.7 MG/DL (ref 0.1–1)
BNP SERPL-MCNC: 273 PG/ML (ref 0–99)
BSA FOR ECHO PROCEDURE: 2.19 M2
BUN SERPL-MCNC: 27 MG/DL (ref 8–23)
CALCIUM SERPL-MCNC: 9.3 MG/DL (ref 8.7–10.5)
CHLORIDE SERPL-SCNC: 107 MMOL/L (ref 95–110)
CO2 SERPL-SCNC: 24 MMOL/L (ref 23–29)
CREAT SERPL-MCNC: 0.7 MG/DL (ref 0.5–1.4)
CV ECHO LV RWT: 0.36 CM
DIFFERENTIAL METHOD: NORMAL
DOP CALC AO PEAK VEL: 1.33 M/S
DOP CALC AO VTI: 31.65 CM
DOP CALC LVOT AREA: 2.7 CM2
DOP CALC LVOT DIAMETER: 1.86 CM
DOP CALC LVOT PEAK VEL: 0.93 M/S
DOP CALC LVOT STROKE VOLUME: 66.27 CM3
DOP CALC MV VTI: 28.01 CM
DOP CALCLVOT PEAK VEL VTI: 24.4 CM
E WAVE DECELERATION TIME: 183.45 MSEC
E/A RATIO: 1.89
E/E' RATIO: 25 M/S
ECHO LV POSTERIOR WALL: 1.02 CM (ref 0.6–1.1)
EJECTION FRACTION: 42 %
EOSINOPHIL # BLD AUTO: 0.3 K/UL (ref 0–0.5)
EOSINOPHIL NFR BLD: 4.5 % (ref 0–8)
ERYTHROCYTE [DISTWIDTH] IN BLOOD BY AUTOMATED COUNT: 12.9 % (ref 11.5–14.5)
EST. GFR  (AFRICAN AMERICAN): >60 ML/MIN/1.73 M^2
EST. GFR  (NON AFRICAN AMERICAN): >60 ML/MIN/1.73 M^2
FRACTIONAL SHORTENING: 14 % (ref 28–44)
GLUCOSE SERPL-MCNC: 115 MG/DL (ref 70–110)
HCT VFR BLD AUTO: 41.9 % (ref 37–48.5)
HGB BLD-MCNC: 13.7 G/DL (ref 12–16)
IMM GRANULOCYTES # BLD AUTO: 0.02 K/UL (ref 0–0.04)
IMM GRANULOCYTES NFR BLD AUTO: 0.4 % (ref 0–0.5)
INTERVENTRICULAR SEPTUM: 0.98 CM (ref 0.6–1.1)
IVRT: 106.57 MSEC
LA MAJOR: 5.24 CM
LA MINOR: 3.39 CM
LEFT ATRIUM SIZE: 4.62 CM
LEFT ATRIUM VOLUME INDEX MOD: 13.7 ML/M2
LEFT ATRIUM VOLUME MOD: 29.27 CM3
LEFT INTERNAL DIMENSION IN SYSTOLE: 4.93 CM (ref 2.1–4)
LEFT VENTRICLE DIASTOLIC VOLUME INDEX: 76.07 ML/M2
LEFT VENTRICLE DIASTOLIC VOLUME: 162.03 ML
LEFT VENTRICLE MASS INDEX: 107 G/M2
LEFT VENTRICLE SYSTOLIC VOLUME INDEX: 53.6 ML/M2
LEFT VENTRICLE SYSTOLIC VOLUME: 114.19 ML
LEFT VENTRICULAR INTERNAL DIMENSION IN DIASTOLE: 5.73 CM (ref 3.5–6)
LEFT VENTRICULAR MASS: 228.37 G
LV LATERAL E/E' RATIO: 20 M/S
LV SEPTAL E/E' RATIO: 33.33 M/S
LYMPHOCYTES # BLD AUTO: 1.4 K/UL (ref 1–4.8)
LYMPHOCYTES NFR BLD: 25.7 % (ref 18–48)
MAGNESIUM SERPL-MCNC: 1.8 MG/DL (ref 1.6–2.6)
MCH RBC QN AUTO: 30.8 PG (ref 27–31)
MCHC RBC AUTO-ENTMCNC: 32.7 G/DL (ref 32–36)
MCV RBC AUTO: 94 FL (ref 82–98)
MONOCYTES # BLD AUTO: 0.6 K/UL (ref 0.3–1)
MONOCYTES NFR BLD: 9.9 % (ref 4–15)
MV A" WAVE DURATION": 11.42 MSEC
MV MEAN GRADIENT: 1 MMHG
MV PEAK A VEL: 0.53 M/S
MV PEAK E VEL: 1 M/S
MV PEAK GRADIENT: 5 MMHG
MV STENOSIS PRESSURE HALF TIME: 53.2 MS
MV VALVE AREA BY CONTINUITY EQUATION: 2.37 CM2
MV VALVE AREA P 1/2 METHOD: 4.14 CM2
NEUTROPHILS # BLD AUTO: 3.3 K/UL (ref 1.8–7.7)
NEUTROPHILS NFR BLD: 58.8 % (ref 38–73)
NRBC BLD-RTO: 0 /100 WBC
PHOSPHATE SERPL-MCNC: 3 MG/DL (ref 2.7–4.5)
PISA MRMAX VEL: 0.04 M/S
PISA TR MAX VEL: 2.45 M/S
PLATELET # BLD AUTO: 222 K/UL (ref 150–450)
PMV BLD AUTO: 9.3 FL (ref 9.2–12.9)
POCT GLUCOSE: 116 MG/DL (ref 70–110)
POCT GLUCOSE: 145 MG/DL (ref 70–110)
POTASSIUM SERPL-SCNC: 4 MMOL/L (ref 3.5–5.1)
PROT SERPL-MCNC: 7 G/DL (ref 6–8.4)
PV MEAN GRADIENT: 2 MMHG
PV PEAK VELOCITY: 1.02 CM/S
RA MAJOR: 4.4 CM
RA WIDTH: 3.5 CM
RBC # BLD AUTO: 4.45 M/UL (ref 4–5.4)
RIGHT VENTRICULAR END-DIASTOLIC DIMENSION: 2.68 CM
SODIUM SERPL-SCNC: 141 MMOL/L (ref 136–145)
TDI LATERAL: 0.05 M/S
TDI SEPTAL: 0.03 M/S
TDI: 0.04 M/S
TR MAX PG: 24 MMHG
TRICUSPID ANNULAR PLANE SYSTOLIC EXCURSION: 1.78 CM
TSH SERPL DL<=0.005 MIU/L-ACNC: 3.21 UIU/ML (ref 0.4–4)
WBC # BLD AUTO: 5.53 K/UL (ref 3.9–12.7)

## 2022-03-29 PROCEDURE — 36415 COLL VENOUS BLD VENIPUNCTURE: CPT | Performed by: HOSPITALIST

## 2022-03-29 PROCEDURE — 80053 COMPREHEN METABOLIC PANEL: CPT | Performed by: HOSPITALIST

## 2022-03-29 PROCEDURE — 25000003 PHARM REV CODE 250: Performed by: INTERNAL MEDICINE

## 2022-03-29 PROCEDURE — 99225 PR SUBSEQUENT OBSERVATION CARE,LEVEL II: ICD-10-PCS | Mod: ,,, | Performed by: HOSPITALIST

## 2022-03-29 PROCEDURE — 99225 PR SUBSEQUENT OBSERVATION CARE,LEVEL II: CPT | Mod: ,,, | Performed by: HOSPITALIST

## 2022-03-29 PROCEDURE — 83735 ASSAY OF MAGNESIUM: CPT | Performed by: HOSPITALIST

## 2022-03-29 PROCEDURE — 84443 ASSAY THYROID STIM HORMONE: CPT | Performed by: HOSPITALIST

## 2022-03-29 PROCEDURE — 63600175 PHARM REV CODE 636 W HCPCS: Performed by: HOSPITALIST

## 2022-03-29 PROCEDURE — 94761 N-INVAS EAR/PLS OXIMETRY MLT: CPT

## 2022-03-29 PROCEDURE — 85025 COMPLETE CBC W/AUTO DIFF WBC: CPT | Performed by: HOSPITALIST

## 2022-03-29 PROCEDURE — 83880 ASSAY OF NATRIURETIC PEPTIDE: CPT | Performed by: FAMILY MEDICINE

## 2022-03-29 PROCEDURE — 36415 COLL VENOUS BLD VENIPUNCTURE: CPT | Performed by: FAMILY MEDICINE

## 2022-03-29 PROCEDURE — 84100 ASSAY OF PHOSPHORUS: CPT | Performed by: HOSPITALIST

## 2022-03-29 PROCEDURE — G0378 HOSPITAL OBSERVATION PER HR: HCPCS

## 2022-03-29 PROCEDURE — 99900035 HC TECH TIME PER 15 MIN (STAT)

## 2022-03-29 PROCEDURE — 25000003 PHARM REV CODE 250: Performed by: HOSPITALIST

## 2022-03-29 RX ORDER — SPIRONOLACTONE 25 MG/1
25 TABLET ORAL DAILY
Qty: 30 TABLET | Refills: 11 | Status: SHIPPED | OUTPATIENT
Start: 2022-03-30 | End: 2024-02-16

## 2022-03-29 RX ADMIN — BUPROPION HYDROCHLORIDE 150 MG: 150 TABLET, EXTENDED RELEASE ORAL at 08:03

## 2022-03-29 RX ADMIN — PANTOPRAZOLE SODIUM 40 MG: 40 TABLET, DELAYED RELEASE ORAL at 08:03

## 2022-03-29 RX ADMIN — FUROSEMIDE 40 MG: 10 INJECTION, SOLUTION INTRAMUSCULAR; INTRAVENOUS at 08:03

## 2022-03-29 RX ADMIN — ASPIRIN 81 MG: 81 TABLET, DELAYED RELEASE ORAL at 08:03

## 2022-03-29 RX ADMIN — SPIRONOLACTONE 25 MG: 25 TABLET ORAL at 08:03

## 2022-03-29 RX ADMIN — AMIODARONE HYDROCHLORIDE 200 MG: 100 TABLET ORAL at 08:03

## 2022-03-29 RX ADMIN — METOPROLOL TARTRATE 25 MG: 25 TABLET, FILM COATED ORAL at 08:03

## 2022-03-29 RX ADMIN — LEVOTHYROXINE SODIUM 50 MCG: 25 TABLET ORAL at 05:03

## 2022-03-29 NOTE — ASSESSMENT & PLAN NOTE
No results found for: LABA1C, HGBA1C  Recent Labs   Lab 03/29/22  0752   POCTGLUCOSE 145*        Low dose correction scale   Cont blood glucose monitoring   BG goal:  Preprandial blood glucose target <140 mg/dL  Random glucoses <180 mg/dL  Avoid hypoglycemia -  Reduce antihyperglycemic therapy when caloric intake is reduced. Avoid insulin stacking as a result of repeated injection of prandial insulin at close intervals.   Avoid severe hyperglycemia  ADA diet  Antihyperglycemics (From admission, onward)            Start     Stop Route Frequency Ordered    03/27/22 2250  insulin aspart U-100 pen 0-5 Units         -- SubQ Before meals & nightly PRN 03/27/22 2150

## 2022-03-29 NOTE — PLAN OF CARE
Patient in no apparent distress. Sat's  96 % on room air. PRN treatments not needed . Will continue to monitor.

## 2022-03-29 NOTE — PLAN OF CARE
03/29/22 1619   Final Note   Assessment Type Final Discharge Note   Anticipated Discharge Disposition Home   What phone number can be called within the next 1-3 days to see how you are doing after discharge? [phone]   Hospital Resources/Appts/Education Provided Appointments scheduled and added to AVS   Post-Acute Status   Discharge Delays None known at this time   Patient discharged to home this afternoon.  Hospital follow up appointments were scheduled and provided to patient verbally and in writing.  No other needs were identified.

## 2022-03-29 NOTE — PROGRESS NOTES
[facility] Medicine  Progress Note    Patient Name: Antonia Polk  MRN: [id number]  Patient Class: OP- Observation   Admission Date: 3/27/2022  Length of Stay: 0 days  Attending Physician: Jose Hodges MD  Primary Care Provider: Jose Phillips MD        Subjective:     Principal Problem:Acute on chronic combined systolic and diastolic congestive heart failure        HPI:  History of Present Illness:  Patient is a 66 y.o. female who has a past medical history of Coronary artery disease, Diabetes mellitus, Hyperlipidemia, Hypertension, MI (myocardial infarction), Neuropathy, Plantar fasciitis, and Thyroid disease presented with dyspnea on exertion and shortness of breath. She was recently discharged from the hospital after being admitted and treated for decompensated heart failure. After discharge patient states she went home and noticed that her ankles were swollen more than when she left the hospital. She was prescribed lasix at discharge and took her medications however she did not notice that she urinated much with the pills. She presented to ED and was found to be tachypneic and hypoxic with oxygen sats around 87% on RA. Chest xray with bilateral opacities similar to previous. BNP at baseline. She is not on home oxygen. She was treated with IV lasix in ED and placed on supplemental oxygen. She states she feels better and her oxygenation improved to 96%. Patient currently denies any fever/chills, chest pain, weakness, numbness, abdominal pain, nausea/vomiting, dysuria/hematuria, or weight loss.       Overview/Hospital Course:  No notes on file    Interval History:  She has been taken off her oxygen.  She is walking back and forth to the restroom and is not short of breath.  She denies chest pain headaches a dizziness.  Review of Systems   Constitutional: Negative.    HENT: Negative.     Eyes: Negative.    Respiratory:  Positive for shortness of breath.    Cardiovascular: Negative.     Gastrointestinal: Negative.    Endocrine: Negative.    Genitourinary: Negative.    Musculoskeletal: Negative.    Skin: Negative.    Allergic/Immunologic: Negative.    Neurological: Negative.    Hematological: Negative.    Psychiatric/Behavioral: Negative.     Objective:     Vital Signs (Most Recent):  Temp: 97.7 °F (36.5 °C) (03/29/22 0722)  Pulse: (!) 57 (03/29/22 0723)  Resp: 20 (03/29/22 0723)  BP: 132/65 (03/29/22 0722)  SpO2: 96 % (03/29/22 0723)   Vital Signs (24h Range):  Temp:  [96.2 °F (35.7 °C)-98.3 °F (36.8 °C)] 97.7 °F (36.5 °C)  Pulse:  [56-67] 57  Resp:  [18-20] 20  SpO2:  [90 %-97 %] 96 %  BP: (129-150)/(61-70) 132/65     Weight: 100.2 kg (221 lb)  Body mass index is 33.6 kg/m².    Intake/Output Summary (Last 24 hours) at 3/29/2022 1021  Last data filed at 3/29/2022 0417  Gross per 24 hour   Intake 840 ml   Output 1500 ml   Net -660 ml      Physical Exam  Vitals and nursing note reviewed.   Constitutional:       Appearance: Normal appearance.   HENT:      Head: Normocephalic and atraumatic.      Right Ear: External ear normal.      Left Ear: External ear normal.      Nose: Nose normal.      Mouth/Throat:      Mouth: Mucous membranes are moist.   Eyes:      Extraocular Movements: Extraocular movements intact.   Cardiovascular:      Rate and Rhythm: Normal rate and regular rhythm.      Pulses: Normal pulses.      Heart sounds: Normal heart sounds.   Pulmonary:      Effort: Pulmonary effort is normal.      Breath sounds: Rales present.   Abdominal:      General: Abdomen is flat. Bowel sounds are normal.      Palpations: Abdomen is soft.   Musculoskeletal:         General: Normal range of motion.      Cervical back: Normal range of motion and neck supple.   Neurological:      General: No focal deficit present.      Mental Status: She is alert and oriented to person, place, and time.   Psychiatric:         Mood and Affect: Mood normal.         Behavior: Behavior normal.       Significant Labs: All  pertinent labs within the past 24 hours have been reviewed.    Significant Imaging: I have reviewed all pertinent imaging results/findings within the past 24 hours.      Assessment/Plan:      * Acute on chronic combined systolic and diastolic congestive heart failure  No results found for this or any previous visit.    Recent Labs   Lab 03/27/22 2047   *     Chest Xray: with bilateral pulmonary edema  Continue IV Lasix  Cont BB, amiodarone  Check d-dimer  Cont to monitor I/O's and daily weights.  Fluid restriction (1.5 liters/24 hours)  Low Na diet  Monitor for signs of fluid overload: RR>30, O2 sat<92%, weight gain of >3 lbs, or urinary output <160ml/8hr  Maintain oxygen sats >92% via NC if supplemental oxygen needed.   Cardiology consulted    Patient Vitals for the past 72 hrs (Last 3 readings):   Weight   03/28/22 1000 100.2 kg (221 lb)   03/27/22 2245 100.5 kg (221 lb 9 oz)   03/27/22 2026 102.1 kg (225 lb)       Hypertension  BP Readings from Last 3 Encounters:   03/29/22 132/65   03/27/22 (!) 147/71   03/26/22 (!) 153/88     Continuing home medications as prescribed  Will cont to monitor and adjust as needed  Will utilize p.r.n. blood pressure medication only if patient's blood pressure greater than 180/110 and she develops symptoms such as worsening chest pain or shortness of breath.  Cardiac diet    Cardiac/Autonomic (From admission, onward)            Start     Stop Route Frequency Ordered    03/28/22 0900  amiodarone tablet 200 mg         -- Oral Daily 03/27/22 2150 03/27/22 2200  metoprolol tartrate (LOPRESSOR) tablet 25 mg         -- Oral 2 times daily 03/27/22 2150 03/27/22 2200  atorvastatin tablet 80 mg         -- Oral Nightly 03/27/22 2150          Hyperlipidemia  No results found for: LDLCALC   Patient is chronically on statin  Continue home medication  Monitor for acute changes      Hypothyroid    Patient has chronic hypothyroidism.   Cont with current dose of levothyroxine to treat    Repeat TSH as outpatient with PCP    Coronary artery disease involving native coronary artery of native heart without angina pectoris  Patient with known CAD   Will continue ASA and Statin  Monitor for S/Sx of angina/ACS.   Continue to monitor on telemetry.         Type 2 diabetes mellitus without complication, without long-term current use of insulin  No results found for: LABA1C, HGBA1C  Recent Labs   Lab 03/29/22  0752   POCTGLUCOSE 145*        Low dose correction scale   Cont blood glucose monitoring   BG goal:  Preprandial blood glucose target <140 mg/dL  Random glucoses <180 mg/dL  Avoid hypoglycemia -  Reduce antihyperglycemic therapy when caloric intake is reduced. Avoid insulin stacking as a result of repeated injection of prandial insulin at close intervals.   Avoid severe hyperglycemia  ADA diet  Antihyperglycemics (From admission, onward)            Start     Stop Route Frequency Ordered    03/27/22 2250  insulin aspart U-100 pen 0-5 Units         -- SubQ Before meals & nightly PRN 03/27/22 2150             VTE Risk Mitigation (From admission, onward)         Ordered     enoxaparin injection 40 mg  Daily         03/27/22 2150     IP VTE LOW RISK PATIENT  Once         03/27/22 2150                Discharge Planning   LEON:      Code Status: Full Code   Is the patient medically ready for discharge?:     Reason for patient still in hospital (select all that apply): Treatment  Discharge Plan A: Home with family                  Alvino Johnson MD  Department of Hospital Medicine   [facility]

## 2022-03-29 NOTE — NURSING
Patient AAOX4, Respirations even and unlabored. No distress noted. Discharge instructions reviewed with the patient and verbalizes understanding. New prescriptions and medication changes reviewed with the patient, verbalizes understanding. Patient transported to personal vehicle via wheelchair to be discharged home with family. All personal belongings sent with the patient.

## 2022-03-29 NOTE — ASSESSMENT & PLAN NOTE
BP Readings from Last 3 Encounters:   03/29/22 132/65   03/27/22 (!) 147/71   03/26/22 (!) 153/88     Continuing home medications as prescribed  Will cont to monitor and adjust as needed  Will utilize p.r.n. blood pressure medication only if patient's blood pressure greater than 180/110 and she develops symptoms such as worsening chest pain or shortness of breath.  Cardiac diet    Cardiac/Autonomic (From admission, onward)            Start     Stop Route Frequency Ordered    03/28/22 0900  amiodarone tablet 200 mg         -- Oral Daily 03/27/22 2150 03/27/22 2200  metoprolol tartrate (LOPRESSOR) tablet 25 mg         -- Oral 2 times daily 03/27/22 2150 03/27/22 2200  atorvastatin tablet 80 mg         -- Oral Nightly 03/27/22 2150

## 2022-03-29 NOTE — SUBJECTIVE & OBJECTIVE
Interval History:  She has been taken off her oxygen.  She is walking back and forth to the restroom and is not short of breath.  She denies chest pain headaches a dizziness.  Review of Systems   Constitutional: Negative.    HENT: Negative.     Eyes: Negative.    Respiratory:  Positive for shortness of breath.    Cardiovascular: Negative.    Gastrointestinal: Negative.    Endocrine: Negative.    Genitourinary: Negative.    Musculoskeletal: Negative.    Skin: Negative.    Allergic/Immunologic: Negative.    Neurological: Negative.    Hematological: Negative.    Psychiatric/Behavioral: Negative.     Objective:     Vital Signs (Most Recent):  Temp: 97.7 °F (36.5 °C) (03/29/22 0722)  Pulse: (!) 57 (03/29/22 0723)  Resp: 20 (03/29/22 0723)  BP: 132/65 (03/29/22 0722)  SpO2: 96 % (03/29/22 0723)   Vital Signs (24h Range):  Temp:  [96.2 °F (35.7 °C)-98.3 °F (36.8 °C)] 97.7 °F (36.5 °C)  Pulse:  [56-67] 57  Resp:  [18-20] 20  SpO2:  [90 %-97 %] 96 %  BP: (129-150)/(61-70) 132/65     Weight: 100.2 kg (221 lb)  Body mass index is 33.6 kg/m².    Intake/Output Summary (Last 24 hours) at 3/29/2022 1021  Last data filed at 3/29/2022 0417  Gross per 24 hour   Intake 840 ml   Output 1500 ml   Net -660 ml      Physical Exam  Vitals and nursing note reviewed.   Constitutional:       Appearance: Normal appearance.   HENT:      Head: Normocephalic and atraumatic.      Right Ear: External ear normal.      Left Ear: External ear normal.      Nose: Nose normal.      Mouth/Throat:      Mouth: Mucous membranes are moist.   Eyes:      Extraocular Movements: Extraocular movements intact.   Cardiovascular:      Rate and Rhythm: Normal rate and regular rhythm.      Pulses: Normal pulses.      Heart sounds: Normal heart sounds.   Pulmonary:      Effort: Pulmonary effort is normal.      Breath sounds: Rales present.   Abdominal:      General: Abdomen is flat. Bowel sounds are normal.      Palpations: Abdomen is soft.   Musculoskeletal:          General: Normal range of motion.      Cervical back: Normal range of motion and neck supple.   Neurological:      General: No focal deficit present.      Mental Status: She is alert and oriented to person, place, and time.   Psychiatric:         Mood and Affect: Mood normal.         Behavior: Behavior normal.       Significant Labs: All pertinent labs within the past 24 hours have been reviewed.    Significant Imaging: I have reviewed all pertinent imaging results/findings within the past 24 hours.

## 2022-03-29 NOTE — PLAN OF CARE
03/29/22 1155   Final Note   Assessment Type Final Discharge Note   Anticipated Discharge Disposition Home   What phone number can be called within the next 1-3 days to see how you are doing after discharge? [phone]   Hospital Resources/Appts/Education Provided Appointments scheduled and added to AVS   Post-Acute Status   Discharge Delays None known at this time   Verbal & written follow up appointment with Dr Phillips & Dr Ritchie provided to patient. Demonstrated understanding by verbal feedback. If not discharged today will change follow up appointment with Dr Ritchie to another day. Denies any other needs at this time.

## 2022-03-29 NOTE — DISCHARGE SUMMARY
[facility]  Discharge Summary      Patient Name: Antonia Polk  MRN: [id number]  Admission Date: 3/27/2022  Hospital Length of Stay: 0 days  Discharge Date and Time:  03/29/2022 1:45 PM  Attending Physician: Jose Hodges MD   Discharging Provider: Alvino Johnson MD  Primary Care Provider: Jose Phillips MD    Admitting diagnosis:    1. Acute on chronic combined systolic and diastolic congestive heart failure     2. Hypertension    3. Hypothyroidism    Final diagnosis:        1. Acute on chronic combined systolic and diastolic congestive heart failure    2. Hypertension    3. Hypothyroidism.            HPI:  66-year-old female with a history of combined systolic and diastolic congestive heart failure was recently discharged from the hospital and noticed swelling of her legs and ankles.  She also was short of breath.  She was seen emergency room and was found to be tachypneic and hypoxic.  Her O2 sat was down to 87% on room air.  Her BNP was in the 700s.  Hospitalist service was asked to admit and treat for congestive heart failure.    * No surgery found *      Hospital Course:  She was placed on telemetry and low-flow oxygen.  She was treated with IV Lasix and p.o. I type tone.  Cardiology consult was obtained.  She had an echocardiogram but results are pending at the time of this dictation.  Her shortness of breath and leg swelling improved with treatment.  She was seen by Cardiology today who agrees with  discharge today are tomorrow.  Patient is medically stable for discharge today.  She needs follow-up by her PCP and Cardiology.    Consults:   Consults (From admission, onward)        Status Ordering Provider     Inpatient consult to Cardiology  Once        Provider:  Gabriel Myles MD    Completed CHIVO LARRY          Final Active Diagnoses:    Diagnosis Date Noted POA    PRINCIPAL PROBLEM:  Acute on chronic combined systolic and diastolic congestive heart failure [I50.43]  03/26/2022 Yes    Hyperlipidemia [E78.5]  Yes    Hypertension [I10]  Yes    Coronary artery disease involving native coronary artery of native heart without angina pectoris [I25.10] 03/26/2022 Yes    Hypothyroid [E03.9] 03/26/2022 Yes    Type 2 diabetes mellitus without complication, without long-term current use of insulin [E11.9] 02/28/2022 Yes      Problems Resolved During this Admission:      Discharged Condition: stable    Disposition: Home or Self Care    Follow Up:   Follow-up Information     July Ritchie MD. Go on 3/30/2022.    Specialty: Internal Medicine  Why: appointment Wednesday March 30th at 11:00am for hospital follow up  Contact information:  [address]  [facility] [place] [address]  [phone]             Jose Phillips MD. Go on 4/11/2022.    Specialty: Cardiology  Why: appointment Monday April 11th at 3:00pm for cardiology follow up  Contact information:  [address] [place] [address] [place] [address]  [phone]                       Patient Instructions:   No discharge procedures on file.  Medications:  Reconciled Home Medications:      Medication List      START taking these medications    spironolactone 25 MG tablet  Commonly known as: ALDACTONE  Take 1 tablet (25 mg total) by mouth once daily.  Start taking on: March 30, 2022        CHANGE how you take these medications    furosemide 20 MG tablet  Commonly known as: LASIX  Take 1 tablet (20 mg total) by mouth 2 (two) times daily. for 5 days  What changed: Another medication with the same name was removed. Continue taking this medication, and follow the directions you see here.        CONTINUE taking these medications    albuterol 90 mcg/actuation inhaler  Commonly known as: PROVENTIL/VENTOLIN HFA  Inhale 1-2 puffs into the lungs every 4 (four) hours as needed for Wheezing or Shortness of Breath. Rescue     amiodarone 200 MG Tab  Commonly known as: PACERONE  Take by mouth once daily.     aspirin 81 MG EC tablet  Commonly known as:  ECOTRIN  Take 81 mg by mouth once daily.     buPROPion 150 MG TB24 tablet  Commonly known as: WELLBUTRIN XL  1 tab, Oral, every 24 hours, # 90 tab, 3 Refill(s), Pharmacy: [id number], 173, cm, 21 13:29:00 CDT, Height/Length Measured, 98.4, kg, 21 14:36:00 CST, Weight Dosing     empagliflozin 25 mg tablet  Commonly known as: JARDIANCE  Take 25 mg by mouth once daily.     ENTRESTO 49-51 mg per tablet  Generic drug: sacubitriL-valsartan  1 tab, Oral, BID, # 60 tab, 0 Refill(s), Pharmacy: [facility]5, 173, cm, 21 10:41:00 CST, Height/Length Measured, 104.5, kg, 21 10:41:00 CST, Weight Dosing     ezetimibe 10 mg tablet  Commonly known as: ZETIA  See Instructions, Take 1 tablet by mouth once daily for 90 days, # 90 tab, 1 Refill(s), Pharmacy: [facility], 173, cm, 21 10:41:00 CST, Height/Length Measured, 104.5, kg, 21 10:41:00 CST, Weight Dosing     gabapentin 300 MG capsule  Commonly known as: NEURONTIN  Take 300 mg by mouth once daily.     hyoscyamine 0.125 mg Tab  Commonly known as: ANASPAZ,LEVSIN  Take 1 tablet (125 mcg total) by mouth every 4 (four) hours as needed (abdominal cramping).     levothyroxine 50 MCG tablet  Commonly known as: SYNTHROID  = 1 tab, Oral, Daily, # 60 tab, 5 Refill(s), Pharmacy: [facility], 173, cm, 10/07/21 16:04:00 CDT, Height/Length Measured, 104.8, kg, 10/07/21 16:04:00 CDT, Weight Dosing     metoprolol tartrate 25 MG tablet  Commonly known as: LOPRESSOR  Take 25 mg by mouth 2 (two) times daily.     nitroGLYCERIN 0.4 MG SL tablet  Commonly known as: NITROSTAT  SMARTSI Tablet(s) Sublingual PRN     ondansetron 8 MG Tbdl  Commonly known as: ZOFRAN-ODT  8 mg.     potassium chloride SA 20 MEQ tablet  Commonly known as: K-DUR,KLOR-CON  Take 1 tablet (20 mEq total) by mouth once daily. for 5 days     rosuvastatin 40 MG Tab  Commonly known as: CRESTOR  Take 40 mg by mouth once daily.        STOP taking these  medications    meloxicam 15 MG tablet  Commonly known as: MOBIC            Significant Diagnostic Studies: Labs:   CMP   Recent Labs   Lab 03/27/22 2047 03/28/22 0628 03/29/22  0621    144 141   K 3.7 3.4* 4.0    107 107   CO2 23 24 24   * 113* 115*   BUN 18 20 27*   CREATININE 0.8 0.7 0.7   CALCIUM 9.0 9.1 9.3   PROT 7.2 6.7 7.0   ALBUMIN 3.2* 2.9* 3.0*   BILITOT 0.8 0.9 0.7   ALKPHOS 76 76 81   AST 20 19 29   ALT 21 21 23   ANIONGAP 12 13 10   ESTGFRAFRICA >60.0 >60.0 >60.0   EGFRNONAA >60.0 >60.0 >60.0   , CBC   Recent Labs   Lab 03/27/22 2047 03/28/22 0628 03/29/22 0622   WBC 7.05 4.99 5.53   HGB 14.2 13.6 13.7   HCT 43.4 41.3 41.9    192 222    and All labs within the past 24 hours have been reviewed    Pending Diagnostic Studies:     Procedure Component Value Units Date/Time    EKG 12-lead [[id number]] Collected: 03/28/22 1158    Order Status: Sent Lab Status: In process Updated: 03/28/22 1500    Narrative:      Test Reason : I48.91,    Vent. Rate : 058 BPM     Atrial Rate : 058 BPM     P-R Int : 174 ms          QRS Dur : 082 ms      QT Int : 494 ms       P-R-T Axes : 038 030 059 degrees     QTc Int : 484 ms    Sinus bradycardia  Anterolateral infarct (cited on or before 20-MAY-2018)  Abnormal ECG  When compared with ECG of 27-MAR-2022 20:32,  T wave inversion now evident in Anterior leads    Referred By: [email or web address]   SELF           Confirmed By:         Indwelling Lines/Drains at time of discharge:   Lines/Drains/Airways     None                 Time spent on the discharge of patient: 23  minutes         Alvino Johnson MD  Department of Hospital Medicine  [facility]

## 2022-03-29 NOTE — ASSESSMENT & PLAN NOTE
No results found for this or any previous visit.    Recent Labs   Lab 03/27/22 2047   *     Chest Xray: with bilateral pulmonary edema  Continue IV Lasix  Cont BB, amiodarone  Check d-dimer  Cont to monitor I/O's and daily weights.  Fluid restriction (1.5 liters/24 hours)  Low Na diet  Monitor for signs of fluid overload: RR>30, O2 sat<92%, weight gain of >3 lbs, or urinary output <160ml/8hr  Maintain oxygen sats >92% via NC if supplemental oxygen needed.   Cardiology consulted    Patient Vitals for the past 72 hrs (Last 3 readings):   Weight   03/28/22 1000 100.2 kg (221 lb)   03/27/22 2245 100.5 kg (221 lb 9 oz)   03/27/22 2026 102.1 kg (225 lb)

## 2022-03-29 NOTE — PROGRESS NOTES
"CARDIOLOGY PROGRESS NOTE    Patient Name:  Antonia Polk    :  1955    Medical Record:  [id number]    DATE OF SERVICE  3/29/2022        SUBJECTIVE  The patient is being seen for follow-up no shortness of breath the patient has not yet been active      REVIEW OF SYSTEMS  General: No chills, No fever, No fatigue  Eyes: No vision changes, No drainage from eyes  ENT: No nasal congestion, no sore throat  Respiratory: No shortness of breath, No cough  Cardiac: No chest pain, palpitations, dyspnea, dizziness, claudication, or syncopal episodes  GI: No abdominal pain, no nausea, no vomiting  : No dysuria  Musculoskeletal: No joint pain  Neuro: No weakness, dizziness, vision changes       OBJECTIVE          PHYSICAL EXAM  Vital Signs:  /66 (BP Location: Right arm, Patient Position: Lying)   Pulse (!) 56   Temp 98 °F (36.7 °C) (Oral)   Resp 18   Ht 5' 8" (1.727 m)   Wt 100.2 kg (221 lb)   SpO2 (!) 94% Comment: nurse notified  Breastfeeding No   BMI 33.60 kg/m²   Temp:  [96.2 °F (35.7 °C)-98.3 °F (36.8 °C)] 98 °F (36.7 °C)  Pulse:  [56-67] 56  Resp:  [18-20] 18  SpO2:  [90 %-97 %] 94 %  BP: (129-150)/(61-70) 136/66      General:   Eyes: Anicteric sclera. Moist conjunctiva. Vision grossly intact.  HENMT: Normocephalic.  Moist mucous membranes. No obvious ulcerations.   Neck: Trachea midline.   Cardiovascular: Heart regular rate and rhythm. S1, S2, S3 1-2/6 systolic murmur over the sternal  Peripheral pulses palpable. No peripheral edema.   Respiratory: Lungs clear to auscultation bilaterally. No increased work of breathing.  Gastrointestinal: Bowel sounds active in all 4 quadrants. Soft, nontender, nondistended.   Genitourinary: Urine clear yellow  Musculoskeletal: Moves all extremities with equal strength.   Skin: Color normal for ethnicity. Skin warm and dry with good turgor. Capillary refill < 3 seconds.   Neurologic: Oriented x 3.  Speech fluent, follows commands.  Psychiatric:  Awake and alert, normal " affect mood good.      LABS    CBC  Recent Labs   Lab 03/27/22 2047 03/28/22 0628 03/29/22  0622   WBC 7.05 4.99 5.53   RBC 4.62 4.38 4.45   HGB 14.2 13.6 13.7   HCT 43.4 41.3 41.9   MCV 94 94 94   MCH 30.7 31.1* 30.8   MCHC 32.7 32.9 32.7   RDW 13.2 13.0 12.9   MPV 9.6 9.1* 9.3    192 222       Chemistry  Recent Labs   Lab 03/27/22 2047 03/28/22 0628 03/29/22  0621    144 141   K 3.7 3.4* 4.0    107 107   CO2 23 24 24   BUN 18 20 27*   CREATININE 0.8 0.7 0.7   * 113* 115*   PROT 7.2 6.7 7.0   CALCIUM 9.0 9.1 9.3   BILITOT 0.8 0.9 0.7   AST 20 19 29   ALT 21 21 23       ABG  No results for input(s): PHART, BXI4BUB, PO2ART, VGS9RYC, A7SABERF, BEART, B0PPPHIK in the last 168 hours.      MICROBIOLOGY  Reviewed    IMAGING & OTHER STUDIES  Reviewed      Intake/Output last 3 shifts:  I/O last 3 completed shifts:  In: 1440 [P.O.:1440]  Out: 1900 [Urine:1900]    Scheduled meds:   amiodarone  200 mg Oral Daily    aspirin  81 mg Oral Daily    atorvastatin  80 mg Oral QHS    buPROPion  150 mg Oral BID    enoxaparin  40 mg Subcutaneous Daily    furosemide (LASIX) injection  40 mg Intravenous Daily    gabapentin  300 mg Oral QHS    levothyroxine  50 mcg Oral Before breakfast    metoprolol tartrate  25 mg Oral BID    pantoprazole  40 mg Oral Daily    spironolactone  25 mg Oral Daily       PRN Meds:  acetaminophen, acetaminophen, albuterol-ipratropium, aluminum-magnesium hydroxide-simethicone, dextrose 50%, dextrose 50%, glucagon (human recombinant), glucose, glucose, insulin aspart U-100, naloxone, ondansetron, prochlorperazine, simethicone, trazodone    Continuous Infusions:      Dietary Orders:  [unfilled]     Admit weight: Weight: 102.1 kg (225 lb)   Today weight: Weight: 100.2 kg (221 lb)      Length of stay in days: 0      ASSESSMENT    Active Hospital Problems    Diagnosis  POA    *Acute on chronic combined systolic and diastolic congestive heart failure [I50.43]  Yes     Hyperlipidemia [E78.5]  Yes    Hypertension [I10]  Yes    Coronary artery disease involving native coronary artery of native heart without angina pectoris [I25.10]  Yes    Hypothyroid [E03.9]  Yes    Type 2 diabetes mellitus without complication, without long-term current use of insulin [E11.9]  Yes      Resolved Hospital Problems   No resolved problems to display.          PLAN    Acute on chronic congestive heart  [place] Heart  Association Class 3   Recent increased salt intake 1/2 weeks ago    3/22  Spironolactone 25 mg daily  S/p readmitted 1 day ago  Lasix 40 mg IV daily     Coronary artery disease  No chest pain  Enzyme negative  Anterior MI undetermined age  MI 2005  Stent x2 2005  Catheterization patient report 2021 medical treatment     AICD  States A[employer] fired 2 years ago  Amiodarone 200 mg daily     Hyperlipidemia  On statin     Hypokalemia  K 4.0      Plan  Continue diuresis  Echo evaluation LV function  Spironolactone 25 mg daily  States recent leg swelling now improved venous Doppler evaluate for DVT   Probable DC in a.m.             Electronically signed by: Gabriel Myles, 3/29/2022 1:12 PM

## 2022-03-29 NOTE — PLAN OF CARE
Problem: Adult Inpatient Plan of Care  Goal: Plan of Care Review  Outcome: Ongoing, Progressing  Flowsheets (Taken 3/28/2022 2124)  Plan of Care Reviewed With: patient  Goal: Patient-Specific Goal (Individualized)  Outcome: Ongoing, Progressing  Flowsheets (Taken 3/28/2022 2124)  Anxieties, Fears or Concerns: PT DENIES  Individualized Care Needs: PT DENIES  Goal: Absence of Hospital-Acquired Illness or Injury  Outcome: Ongoing, Progressing  Intervention: Identify and Manage Fall Risk  Flowsheets (Taken 3/28/2022 2124)  Safety Promotion/Fall Prevention:   assistive device/personal item within reach   nonskid shoes/socks when out of bed   instructed to call staff for mobility  Intervention: Prevent Skin Injury  Flowsheets (Taken 3/28/2022 2124)  Body Position: position changed independently  Skin Protection: adhesive use limited  Intervention: Prevent and Manage VTE (Venous Thromboembolism) Risk  Flowsheets (Taken 3/28/2022 2124)  Activity Management: Ambulated to bathroom - L4  VTE Prevention/Management: ambulation promoted  Range of Motion: active ROM (range of motion) encouraged  Intervention: Prevent Infection  Flowsheets (Taken 3/28/2022 2124)  Infection Prevention: hand hygiene promoted  Goal: Optimal Comfort and Wellbeing  Outcome: Ongoing, Progressing  Intervention: Monitor Pain and Promote Comfort  Flowsheets (Taken 3/28/2022 2124)  Pain Management Interventions: care clustered  Intervention: Provide Person-Centered Care  Flowsheets (Taken 3/28/2022 2124)  Trust Relationship/Rapport:   care explained   choices provided  Goal: Readiness for Transition of Care  Outcome: Ongoing, Progressing  Intervention: Mutually Develop Transition Plan  Flowsheets (Taken 3/28/2022 2124)  Equipment Currently Used at Home: none  Transportation Anticipated: family or friend will provide     Problem: Infection  Goal: Absence of Infection Signs and Symptoms  Outcome: Ongoing, Progressing  Intervention: Prevent or Manage  Infection  Flowsheets (Taken 3/28/2022 2124)  Infection Management: aseptic technique maintained     Problem: Diabetes Comorbidity  Goal: Blood Glucose Level Within Targeted Range  Outcome: Ongoing, Progressing  Intervention: Monitor and Manage Glycemia  Flowsheets (Taken 3/28/2022 2124)  Glycemic Management: blood glucose monitored     Problem: Fall Injury Risk  Goal: Absence of Fall and Fall-Related Injury  Outcome: Ongoing, Progressing  Intervention: Identify and Manage Contributors  Flowsheets (Taken 3/28/2022 2124)  Self-Care Promotion:   independence encouraged   BADL personal objects within reach  Intervention: Promote Injury-Free Environment  Flowsheets (Taken 3/28/2022 2124)  Safety Promotion/Fall Prevention:   assistive device/personal item within reach   nonskid shoes/socks when out of bed   instructed to call staff for mobility     Problem: Adjustment to Illness (Heart Failure)  Goal: Optimal Coping  Outcome: Ongoing, Progressing  Intervention: Support Psychosocial Response  Flowsheets (Taken 3/28/2022 2124)  Supportive Measures:   active listening utilized   counseling provided   decision-making supported   goal-setting facilitated   positive reinforcement provided  Family/Support System Care: involvement promoted     Problem: Cardiac Output Decreased (Heart Failure)  Goal: Optimal Cardiac Output  Outcome: Ongoing, Progressing  Intervention: Optimize Cardiac Output  Flowsheets (Taken 3/28/2022 2124)  Stabilization Measures: airway opened  Environmental Support: calm environment promoted     Problem: Dysrhythmia (Heart Failure)  Goal: Stable Heart Rate and Rhythm  Outcome: Ongoing, Progressing     Problem: Fluid Imbalance (Heart Failure)  Goal: Fluid Balance  Outcome: Ongoing, Progressing  Intervention: Monitor and Manage Fluid Balance  Flowsheets (Taken 3/28/2022 2124)  Fluid/Electrolyte Management: fluids restricted     Problem: Functional Ability Impaired (Heart Failure)  Goal: Optimal Functional  Ability  Outcome: Ongoing, Progressing  Intervention: Optimize Functional Ability  Flowsheets (Taken 3/28/2022 2124)  Self-Care Promotion:   independence encouraged   BADL personal objects within reach  Activity Management: Ambulated to bathroom - L4     Problem: Oral Intake Inadequate (Heart Failure)  Goal: Optimal Nutrition Intake  Outcome: Ongoing, Progressing  Intervention: Promote and Optimize Nutrition Intake  Flowsheets (Taken 3/28/2022 2124)  Oral Nutrition Promotion: rest periods promoted     Problem: Respiratory Compromise (Heart Failure)  Goal: Effective Oxygenation and Ventilation  Outcome: Ongoing, Progressing  Intervention: Promote Airway Secretion Clearance  Flowsheets (Taken 3/28/2022 2124)  Breathing Techniques/Airway Clearance: deep/controlled cough encouraged  Cough And Deep Breathing: done independently per patient  Intervention: Optimize Oxygenation and Ventilation  Flowsheets (Taken 3/28/2022 2124)  Airway/Ventilation Management: airway patency maintained     Problem: Sleep Disordered Breathing (Heart Failure)  Goal: Effective Breathing Pattern During Sleep  Outcome: Ongoing, Progressing  Intervention: Monitor and Manage Obstructive Sleep Apnea  Flowsheets (Taken 3/28/2022 2124)  NPPV/CPAP Maintenance:   nasal prongs adjusted   tubes secured   POC reviewed at bedside. Questions and concerns addressed. VSS. Placed bed in low and locked position. Call light within reach. Side rails up x2. Instructed to call for any needs. Verbalized understanding of all instructions. Frequent rounds.

## 2022-03-29 NOTE — PLAN OF CARE
Problem: Adult Inpatient Plan of Care  Goal: Plan of Care Review  Outcome: Met     Problem: Adult Inpatient Plan of Care  Goal: Patient-Specific Goal (Individualized)  Outcome: Met     Problem: Adult Inpatient Plan of Care  Goal: Absence of Hospital-Acquired Illness or Injury  Outcome: Met     Problem: Adult Inpatient Plan of Care  Goal: Optimal Comfort and Wellbeing  Outcome: Met     Problem: Adult Inpatient Plan of Care  Goal: Readiness for Transition of Care  Outcome: Met     Problem: Infection  Goal: Absence of Infection Signs and Symptoms  Outcome: Met     Problem: Diabetes Comorbidity  Goal: Blood Glucose Level Within Targeted Range  Outcome: Met

## 2022-04-25 ENCOUNTER — HOSPITAL ENCOUNTER (EMERGENCY)
Facility: HOSPITAL | Age: 67
Discharge: HOME | End: 2022-04-25
Attending: INTERNAL MEDICINE
Payer: COMMERCIAL

## 2022-04-25 VITALS
HEIGHT: 68 IN | BODY MASS INDEX: 33.6 KG/M2 | SYSTOLIC BLOOD PRESSURE: 119 MMHG | DIASTOLIC BLOOD PRESSURE: 75 MMHG | RESPIRATION RATE: 13 BRPM | OXYGEN SATURATION: 95 % | HEART RATE: 53 BPM

## 2022-04-25 DIAGNOSIS — R07.9 CHEST PAIN, UNSPECIFIED TYPE: ICD-10-CM

## 2022-04-25 DIAGNOSIS — R07.9 CHEST PAIN: ICD-10-CM

## 2022-04-25 DIAGNOSIS — R06.02 SHORTNESS OF BREATH: ICD-10-CM

## 2022-04-25 DIAGNOSIS — R55 NEAR SYNCOPE: Primary | ICD-10-CM

## 2022-04-25 LAB
ALBUMIN SERPL BCP-MCNC: 3.7 G/DL (ref 3.5–5.2)
ALP SERPL-CCNC: 74 U/L (ref 55–135)
ALT SERPL W/O P-5'-P-CCNC: 66 U/L (ref 10–44)
ANION GAP SERPL CALC-SCNC: 11 MMOL/L (ref 8–16)
AST SERPL-CCNC: 49 U/L (ref 10–40)
BASOPHILS # BLD AUTO: 0.04 K/UL (ref 0–0.2)
BASOPHILS NFR BLD: 0.6 % (ref 0–1.9)
BILIRUB SERPL-MCNC: 0.6 MG/DL (ref 0.1–1)
BILIRUB UR QL STRIP: NEGATIVE
BNP SERPL-MCNC: 139 PG/ML (ref 0–99)
BUN SERPL-MCNC: 21 MG/DL (ref 8–23)
CALCIUM SERPL-MCNC: 10 MG/DL (ref 8.7–10.5)
CHLORIDE SERPL-SCNC: 105 MMOL/L (ref 95–110)
CLARITY UR: CLEAR
CO2 SERPL-SCNC: 24 MMOL/L (ref 23–29)
COLOR UR: YELLOW
CREAT SERPL-MCNC: 1 MG/DL (ref 0.5–1.4)
DIFFERENTIAL METHOD: NORMAL
EOSINOPHIL # BLD AUTO: 0.1 K/UL (ref 0–0.5)
EOSINOPHIL NFR BLD: 1.6 % (ref 0–8)
ERYTHROCYTE [DISTWIDTH] IN BLOOD BY AUTOMATED COUNT: 13 % (ref 11.5–14.5)
EST. GFR  (AFRICAN AMERICAN): >60 ML/MIN/1.73 M^2
EST. GFR  (NON AFRICAN AMERICAN): 59.7 ML/MIN/1.73 M^2
GLUCOSE SERPL-MCNC: 118 MG/DL (ref 70–110)
GLUCOSE UR QL STRIP: ABNORMAL
HCT VFR BLD AUTO: 47.3 % (ref 37–48.5)
HGB BLD-MCNC: 15.9 G/DL (ref 12–16)
HGB UR QL STRIP: ABNORMAL
IMM GRANULOCYTES # BLD AUTO: 0.02 K/UL (ref 0–0.04)
IMM GRANULOCYTES NFR BLD AUTO: 0.3 % (ref 0–0.5)
KETONES UR QL STRIP: NEGATIVE
LEUKOCYTE ESTERASE UR QL STRIP: NEGATIVE
LYMPHOCYTES # BLD AUTO: 2.1 K/UL (ref 1–4.8)
LYMPHOCYTES NFR BLD: 30.9 % (ref 18–48)
MCH RBC QN AUTO: 30.8 PG (ref 27–31)
MCHC RBC AUTO-ENTMCNC: 33.6 G/DL (ref 32–36)
MCV RBC AUTO: 92 FL (ref 82–98)
MONOCYTES # BLD AUTO: 0.6 K/UL (ref 0.3–1)
MONOCYTES NFR BLD: 8.4 % (ref 4–15)
NEUTROPHILS # BLD AUTO: 3.9 K/UL (ref 1.8–7.7)
NEUTROPHILS NFR BLD: 58.2 % (ref 38–73)
NITRITE UR QL STRIP: NEGATIVE
NRBC BLD-RTO: 0 /100 WBC
PH UR STRIP: 5 [PH] (ref 5–8)
PLATELET # BLD AUTO: 165 K/UL (ref 150–450)
PMV BLD AUTO: 11.4 FL (ref 9.2–12.9)
POTASSIUM SERPL-SCNC: 3.9 MMOL/L (ref 3.5–5.1)
PROT SERPL-MCNC: 7.3 G/DL (ref 6–8.4)
PROT UR QL STRIP: NEGATIVE
RBC # BLD AUTO: 5.16 M/UL (ref 4–5.4)
SODIUM SERPL-SCNC: 140 MMOL/L (ref 136–145)
SP GR UR STRIP: 1.01 (ref 1–1.03)
TROPONIN I SERPL DL<=0.01 NG/ML-MCNC: <0.006 NG/ML (ref 0–0.03)
URN SPEC COLLECT METH UR: ABNORMAL
UROBILINOGEN UR STRIP-ACNC: NEGATIVE EU/DL
WBC # BLD AUTO: 6.76 K/UL (ref 3.9–12.7)

## 2022-04-25 PROCEDURE — 85025 COMPLETE CBC W/AUTO DIFF WBC: CPT | Performed by: INTERNAL MEDICINE

## 2022-04-25 PROCEDURE — 84484 ASSAY OF TROPONIN QUANT: CPT | Performed by: INTERNAL MEDICINE

## 2022-04-25 PROCEDURE — 83880 ASSAY OF NATRIURETIC PEPTIDE: CPT | Performed by: INTERNAL MEDICINE

## 2022-04-25 PROCEDURE — 70450 CT HEAD/BRAIN W/O DYE: CPT | Mod: TC

## 2022-04-25 PROCEDURE — 93005 ELECTROCARDIOGRAM TRACING: CPT

## 2022-04-25 PROCEDURE — 93010 EKG 12-LEAD: ICD-10-PCS | Mod: ,,, | Performed by: INTERNAL MEDICINE

## 2022-04-25 PROCEDURE — 99285 EMERGENCY DEPT VISIT HI MDM: CPT | Mod: 25

## 2022-04-25 PROCEDURE — 71045 XR CHEST AP PORTABLE: ICD-10-PCS | Mod: 26,,, | Performed by: RADIOLOGY

## 2022-04-25 PROCEDURE — 71045 X-RAY EXAM CHEST 1 VIEW: CPT | Mod: TC,FY

## 2022-04-25 PROCEDURE — 93010 ELECTROCARDIOGRAM REPORT: CPT | Mod: ,,, | Performed by: INTERNAL MEDICINE

## 2022-04-25 PROCEDURE — 70450 CT HEAD WITHOUT CONTRAST: ICD-10-PCS | Mod: 26,,, | Performed by: RADIOLOGY

## 2022-04-25 PROCEDURE — 70450 CT HEAD/BRAIN W/O DYE: CPT | Mod: 26,,, | Performed by: RADIOLOGY

## 2022-04-25 PROCEDURE — 81003 URINALYSIS AUTO W/O SCOPE: CPT | Performed by: INTERNAL MEDICINE

## 2022-04-25 PROCEDURE — 80053 COMPREHEN METABOLIC PANEL: CPT | Performed by: INTERNAL MEDICINE

## 2022-04-25 PROCEDURE — 71045 X-RAY EXAM CHEST 1 VIEW: CPT | Mod: 26,,, | Performed by: RADIOLOGY

## 2022-04-25 NOTE — ED NOTES
[facility] Rep returns call. Antonia relays that the patient is showing a 5 year battery life available. Her pacemaker is actually set to capture at heart rates less than 40 bpm. FREYA Hopper aware.

## 2022-04-25 NOTE — ED PROVIDER NOTES
Encounter Date: 4/25/2022       History     Chief Complaint   Patient presents with    Dizziness     Patient reports dizziness, lightheaded, and weakness. Patient stated that early this morning she was awoken to a pressure in her chest. Pressure has resolved but patient is still dizzy and weak     Weakness     Patient comes in complaining of chest pain shortness of breath dizziness.  Patient states she had some reflux and gas pain last night that she took Tums and got better, but she woke up at 3 this morning with a heaviness in her chest some shortness of the breast that lasted about an hour before got better.  She started come to ER but he got better.  She went to work today as a  had another episode associated dizziness and came to emergency room.    The patient has an AICD pacemaker and history of acute on chronic congestive heart failure.  The patient states she was seen by cardiologist last week and her amiodarone was decreased and also around lack tone was decreased.  She still takes Lasix twice a day.        Review of patient's allergies indicates:   Allergen Reactions    Demerol [meperidine]     Diclofenac      Itching., GI issues.      Past Medical History:   Diagnosis Date    Coronary artery disease     Diabetes mellitus     Hyperlipidemia     Hypertension     MI (myocardial infarction)     Neuropathy     Plantar fasciitis     Thyroid disease      Past Surgical History:   Procedure Laterality Date    A-V CARDIAC PACEMAKER INSERTION      HYSTERECTOMY      INSERTION OF PACEMAKER      REMOVAL OF IMPLANTED CARDIOVERTER-DEFIBRILLATOR (ICD)       History reviewed. No pertinent family history.  Social History     Tobacco Use    Smoking status: Never Smoker    Smokeless tobacco: Never Used   Substance Use Topics    Alcohol use: Yes     Comment: occ    Drug use: Never     Review of Systems   Constitutional: Negative for fever.   HENT: Negative for sore throat.    Respiratory:  Negative for shortness of breath.    Cardiovascular: Negative for chest pain.   Gastrointestinal: Negative for nausea.   Genitourinary: Negative for dysuria.   Musculoskeletal: Negative for back pain.   Skin: Negative for rash.   Neurological: Negative for weakness.   Hematological: Does not bruise/bleed easily.       Physical Exam     Initial Vitals [04/25/22 0924]   BP Pulse Resp Temp SpO2   118/71 (!) 58 20 -- 96 %      MAP       --         Physical Exam    Vitals reviewed.  Constitutional: She appears well-developed.   Eyes: Pupils are equal, round, and reactive to light.   Neck:   Normal range of motion.  Cardiovascular: Normal rate. Exam reveals gallop and S3.    Pulmonary/Chest: She has rales.   Abdominal: Abdomen is soft.   Musculoskeletal:         General: Normal range of motion.      Cervical back: Normal range of motion.     Neurological: She is alert. She has normal strength and normal reflexes. No cranial nerve deficit. GCS eye subscore is 4. GCS verbal subscore is 5. GCS motor subscore is 6.   Skin: Skin is warm.   Psychiatric: She has a normal mood and affect.         ED Course   Procedures  Labs Reviewed   COMPREHENSIVE METABOLIC PANEL - Abnormal; Notable for the following components:       Result Value    Glucose 118 (*)     AST 49 (*)     ALT 66 (*)     eGFR if non  59.7 (*)     All other components within normal limits    Narrative:     Recoll. [id number] by [facility] at 04/25/2022 10:36, reason: Specimen   hemolyzed   B-TYPE NATRIURETIC PEPTIDE - Abnormal; Notable for the following components:     (*)     All other components within normal limits   URINALYSIS, REFLEX TO URINE CULTURE - Abnormal; Notable for the following components:    Glucose, UA 2+ (*)     Occult Blood UA Trace (*)     All other components within normal limits    Narrative:     Preferred Collection Type->Urine, Clean Catch  Specimen Source->Urine   CBC W/ AUTO DIFFERENTIAL   TROPONIN I    Narrative:      Recoll. [id number] by [facility] [place] [facility] at 04/25/2022 10:36, reason: Specimen   hemolyzed        ECG Results          EKG 12-lead (Chest Pain) Age >30 (Preliminary result)  Result time 04/25/22 09:31:53    ED Interpretation by Jonathan Hopper MD (04/25/22 09:31:53, [facility] Emergency Dept, Emergency Medicine)    Sinus bradycardia rate of 56 and no acute ischemic changes.                            Imaging Results          CT Head Without Contrast (Final result)  Result time 04/25/22 10:07:08    Final result by Nick Bradley MD (04/25/22 10:07:08)                 Impression:      Mild cortical atrophy without acute intracranial abnormality.      Electronically signed by: Nick Bradley  Date:    04/25/2022  Time:    10:07             Narrative:    EXAMINATION:  CT HEAD WITHOUT CONTRAST    CLINICAL HISTORY:  Dizziness, persistent/recurrent, cardiac or vascular cause suspected;    TECHNIQUE:  Low dose axial images were obtained through the head.  Coronal and sagittal reformations were also performed. Contrast was not administered.    COMPARISON:  CT 03/03/2005.    FINDINGS:  There is no acute hemorrhage or infarction.  There is mild cortical atrophy.  There is a normal pattern of gray-white matter differentiation.    No extra-axial fluid collections.  Ventricles are normal in size, shape and configuration.  The basal cisterns are patent.    The imaged paranasal sinuses and ethmoid air cells are well aerated.    The mastoid air cells and middle ears are normally pneumatized.                               X-Ray Chest AP Portable (Final result)  Result time 04/25/22 09:58:21    Final result by Nick Bradley MD (04/25/22 09:58:21)                 Impression:      No acute chest disease    Cardiomegaly.    Pacemaker.      Electronically signed by: Nick Bradley  Date:    04/25/2022  Time:    09:58             Narrative:    EXAMINATION:  XR CHEST AP PORTABLE    CLINICAL HISTORY:  Chest Pain;.    TECHNIQUE:  Single frontal  portable view of the chest was performed.    COMPARISON:  03/27/2022.    FINDINGS:  Support devices: Left axillary pacemaker in place.    The lungs are clear, with normal appearance of pulmonary vasculature and no pleural effusion or pneumothorax.    The cardiac silhouette is enlarged.  The hilar and mediastinal contours are unremarkable.    Bones are intact.                    ED Interpretation by Jonathan Hopper MD (04/25/22 09:48:06, [facility] Emergency Dept, Emergency Medicine)    No acute findings                               Medications - No data to display  Medical Decision Making:   ED Management:  The patient now sugar better explain that the dizziness could be multifactorial.  One is that she still is on the Lasix and spironolactone for fluid, she is not in congestive heart failure at present.  Renal function appears to be normal.  Her pacemaker is actually sat for 40 not the normal 60, she is on amiodarone and neck combination may be actually affecting her dizziness as well.  At this point she had a reason for admission but advised to follow back up with her cardiologist and consider readjusting medications.             ED Course as of 04/25/22 1136   Mon Apr 25, 2022   0931 EKG 12-lead (Chest Pain) Age >30 [PW]   0948 X-Ray Chest AP Portable [PW]   1017 CBC auto differential [PW]   1017 CT Head Without Contrast [PW]   1024 The patient's pacemaker does not kick in until she gets under 40, according to the cardiologist at [facility] [PW]   1050 Urinalysis, Reflex to Urine Culture Urine, Clean Catch(!) [PW]   1053 BNP(!): 139 [PW]   1130 Troponin I: <0.006 [PW]   1130 Comprehensive metabolic panel(!) [PW]   1133 Patient has not symptomatic, discussed options. [PW]      ED Course User Index  [PW] Jonathan Hopper MD             Clinical Impression:   Final diagnoses:  [R07.9] Chest pain  [R55] Near syncope (Primary)  [R07.9] Chest pain, unspecified type  [R06.02] Shortness of breath          ED Disposition  Condition    Discharge Stable        ED Prescriptions     None        Follow-up Information     Follow up With Specialties Details Why Contact Info    Jose Phillips MD Cardiology   [address] [place] [id number]  [phone]             Jonathan Hopper MD  04/25/22 1135       Jonathan Hopper MD  04/25/22 1133

## 2022-04-25 NOTE — Clinical Note
"Antonia Hopperen" Felicitas was seen and treated in our emergency department on 4/25/2022.  She may return to work on 04/26/2022.       If you have any questions or concerns, please don't hesitate to call.      Marah CLINTON    "

## 2022-04-29 NOTE — DISCHARGE SUMMARY
[facility] - Med Surg Hospital Medicine  Discharge Summary      Patient Name: Antonia Polk  MRN: [id number]  Patient Class: OP- Observation  Admission Date: 3/26/2022  Hospital Length of Stay: 1 days  Discharge Date and Time: 3/27/2022  1:37 PM  Attending Physician: Maine Carbone MD  Discharging Provider: Maine Carbone MD  Primary Care Provider: Jose Phillips MD      HPI:   65 yo female with PMH of CAD with MI in 2005 s/p stent and AICD placement, DMII, htn, dld, CHF presents to the ER with complaints of worsening dyspnea on exertion and shortness of breath. She has noted worsening leg swelling over the past several days and has being using lasix 20 mg PO BID, which she is prescribed to use as needed for swelling and SOB, for the last several days with minimal relief. She was evaluated in the ER this morning and given lasix which improved symptoms somewhat and was discharged home. Later in the afternoon she became increasingly short of breath and was unable to walk to the bathroom without becoming winded so returned to the ER where she was given IV lasix with some relief. BNP elevated in the 800s. Troponin normal. CXR with interstitial edema favoring pulmonary edema. Hospital team called for admission.        * No surgery found *        Goals of Care Treatment Preferences:  Code Status: Full Code      Consults:     Hospital Course:      * Acute on chronic combined systolic and diastolic congestive heart failure  Patient is identified as having Combined Systolic and Diastolic heart failure that is Acute on chronic. CHF is currently uncontrolled due to Continued edema of extremities, Dyspnea not returned to baseline after 1 doses of IV diuretic, Rales/crackles on pulmonary exam and Pulmonary edema/pleural effusion on CXR. Latest ECHO performed in 06/2021 - reported to have EF 40%.   Has CAD with AICD, h/o multiple stents and h/o of MI in 2005    Continue Beta Blocker and Furosemide  and monitor clinical status closely. Monitor on telemetry.Monitor strict Is&Os and daily weights.  Place on fluid restriction of 1.5 L. Continue to stress to patient importance of self efficacy and  on diet for CHF. Last BNP reviewed- and noted below   Recent Labs   Lab 03/26/22  0813   *   .      Hypothyroid  Continue home medications      Coronary artery disease involving native coronary artery of native heart without angina pectoris  Continue ASA, amiodarone  With AICD in place, last interrogated in 2021  Last echo and stress test in 2021 with no need for coronary intervention      Type 2 diabetes mellitus without complication, without long-term current use of insulin  Patient's FSGs are controlled on current medication regimen.  Last A1c reviewed- No results found for: LABA1C, HGBA1C  Most recent fingerstick glucose reviewed- No results for input(s): POCTGLUCOSE in the last 24 hours.  Current correctional scale  none, add if uncontrolled hyperglycemia    Antihyperglycemics (From admission, onward)            None        Hold Oral hypoglycemics while patient is in the hospital.              Final Active Diagnoses:    Diagnosis Date Noted POA    PRINCIPAL PROBLEM:  Acute on chronic combined systolic and diastolic congestive heart failure [I50.43] 03/26/2022 Yes    Coronary artery disease involving native coronary artery of native heart without angina pectoris [I25.10] 03/26/2022 Yes    Hypothyroid [E03.9] 03/26/2022 Yes    Type 2 diabetes mellitus without complication, without long-term current use of insulin [E11.9] 02/28/2022 Yes      Problems Resolved During this Admission:       Discharged Condition: good    Disposition: Home or Self Care    Follow Up:   Follow-up Information     Jose Phillips MD Follow up in 1 week(s).    Specialty: Cardiology  Contact information:  [facility] [place] [id number]  [phone]                       Patient Instructions:      Diet Cardiac     Notify your  health care provider if you experience any of the following:  difficulty breathing or increased cough     Notify your health care provider if you experience any of the following:  severe uncontrolled pain     Notify your health care provider if you experience any of the following:  persistent dizziness, light-headedness, or visual disturbances     Notify your health care provider if you experience any of the following:  increased confusion or weakness     Activity as tolerated       Significant Diagnostic Studies:   Results for orders placed or performed during the hospital encounter of 03/26/22   COVID-19 Rapid Screening   Result Value Ref Range    SARS-CoV-2 RNA, Amplification, Qual Negative Negative   Urinalysis, Reflex to Urine Culture Urine, Clean Catch    Specimen: Urine, Clean Catch   Result Value Ref Range    Specimen UA Urine, Clean Catch     Color, UA Yellow Yellow, Straw, Malreny    Appearance, UA Clear Clear    pH, UA 6.0 5.0 - 8.0    Specific Gravity, UA 1.015 1.005 - 1.030    Protein, UA Negative Negative    Glucose, UA 2+ (A) Negative    Ketones, UA Negative Negative    Bilirubin (UA) Negative Negative    Occult Blood UA Negative Negative    Nitrite, UA Negative Negative    Urobilinogen, UA Negative Negative EU/dL    Leukocytes, UA Negative Negative   Troponin I   Result Value Ref Range    Troponin I <0.006 0.000 - 0.026 ng/mL   Basic Metabolic Panel (BMP)   Result Value Ref Range    Sodium 143 136 - 145 mmol/L    Potassium 3.6 3.5 - 5.1 mmol/L    Chloride 110 95 - 110 mmol/L    CO2 23 23 - 29 mmol/L    Glucose 117 (H) 70 - 110 mg/dL    BUN 16 8 - 23 mg/dL    Creatinine 0.7 0.5 - 1.4 mg/dL    Calcium 9.0 8.7 - 10.5 mg/dL    Anion Gap 10 8 - 16 mmol/L    eGFR if African American >60.0 >60 mL/min/1.73 m^2    eGFR if non African American >60.0 >60 mL/min/1.73 m^2   Magnesium   Result Value Ref Range    Magnesium 1.8 1.6 - 2.6 mg/dL   Phosphorus   Result Value Ref Range    Phosphorus 3.3 2.7 - 4.5 mg/dL    CBC with Automated Differential   Result Value Ref Range    WBC 6.30 3.90 - 12.70 K/uL    RBC 4.23 4.00 - 5.40 M/uL    Hemoglobin 13.1 12.0 - 16.0 g/dL    Hematocrit 40.4 37.0 - 48.5 %    MCV 96 82 - 98 fL    MCH 31.0 27.0 - 31.0 pg    MCHC 32.4 32.0 - 36.0 g/dL    RDW 13.2 11.5 - 14.5 %    Platelets 198 150 - 450 K/uL    MPV 9.3 9.2 - 12.9 fL    Immature Granulocytes 0.3 0.0 - 0.5 %    Gran # (ANC) 4.3 1.8 - 7.7 K/uL    Immature Grans (Abs) 0.02 0.00 - 0.04 K/uL    Lymph # 1.3 1.0 - 4.8 K/uL    Mono # 0.5 0.3 - 1.0 K/uL    Eos # 0.2 0.0 - 0.5 K/uL    Baso # 0.01 0.00 - 0.20 K/uL    nRBC 0 0 /100 WBC    Gran % 67.4 38.0 - 73.0 %    Lymph % 21.3 18.0 - 48.0 %    Mono % 8.4 4.0 - 15.0 %    Eosinophil % 2.4 0.0 - 8.0 %    Basophil % 0.2 0.0 - 1.9 %    Differential Method Automated    POCT glucose   Result Value Ref Range    POCT Glucose 177 (H) 70 - 110 mg/dL     No orders to display         Pending Diagnostic Studies:     None         Medications:  Reconciled Home Medications:      Medication List      CHANGE how you take these medications    furosemide 20 MG tablet  Commonly known as: LASIX  Take 1 tablet (20 mg total) by mouth 2 (two) times daily. for 5 days  What changed: Another medication with the same name was removed. Continue taking this medication, and follow the directions you see here.        CONTINUE taking these medications    albuterol 90 mcg/actuation inhaler  Commonly known as: PROVENTIL/VENTOLIN HFA  Inhale 1-2 puffs into the lungs every 4 (four) hours as needed for Wheezing or Shortness of Breath. Rescue     amiodarone 200 MG Tab  Commonly known as: PACERONE  Take by mouth once daily.     aspirin 81 MG EC tablet  Commonly known as: ECOTRIN  Take 81 mg by mouth once daily.     buPROPion 150 MG TB24 tablet  Commonly known as: WELLBUTRIN XL  1 tab, Oral, every 24 hours, # 90 tab, 3 Refill(s), Pharmacy: [id number], 173, cm, 03/16/21 13:29:00 CDT, Height/Length Measured, 98.4, kg, 01/26/21  14:36:00 CST, Weight Dosing     empagliflozin 25 mg tablet  Commonly known as: JARDIANCE  Take 25 mg by mouth once daily.     ENTRESTO 49-51 mg per tablet  Generic drug: sacubitriL-valsartan  1 tab, Oral, BID, # 60 tab, 0 Refill(s), Pharmacy: [id number], 173, cm, 21 10:41:00 CST, Height/Length Measured, 104.5, kg, 21 10:41:00 CST, Weight Dosing     ezetimibe 10 mg tablet  Commonly known as: ZETIA  See Instructions, Take 1 tablet by mouth once daily for 90 days, # 90 tab, 1 Refill(s), Pharmacy: [id number], 173, cm, 21 10:41:00 CST, Height/Length Measured, 104.5, kg, 21 10:41:00 CST, Weight Dosing     gabapentin 300 MG capsule  Commonly known as: NEURONTIN  Take 300 mg by mouth once daily.     hyoscyamine 0.125 mg Tab  Commonly known as: ANASPAZ,LEVSIN  Take 1 tablet (125 mcg total) by mouth every 4 (four) hours as needed (abdominal cramping).     levothyroxine 50 MCG tablet  Commonly known as: SYNTHROID  = 1 tab, Oral, Daily, # 60 tab, 5 Refill(s), Pharmacy: [id number], 173, cm, 10/07/21 16:04:00 CDT, Height/Length Measured, 104.8, kg, 10/07/21 16:04:00 CDT, Weight Dosing     metoprolol tartrate 25 MG tablet  Commonly known as: LOPRESSOR  Take 25 mg by mouth 2 (two) times daily.     nitroGLYCERIN 0.4 MG SL tablet  Commonly known as: NITROSTAT  SMARTSI Tablet(s) Sublingual PRN     ondansetron 8 MG Tbdl  Commonly known as: ZOFRAN-ODT  8 mg.     rosuvastatin 40 MG Tab  Commonly known as: CRESTOR  Take 40 mg by mouth once daily.        STOP taking these medications    meloxicam 15 MG tablet  Commonly known as: MOBIC        ASK your doctor about these medications    potassium chloride SA 20 MEQ tablet  Commonly known as: K-DUR,KLOR-CON  Take 1 tablet (20 mEq total) by mouth once daily. for 5 days  Ask about: Should I take this medication?            Indwelling Lines/Drains at time of discharge:   Lines/Drains/Airways       None                   Time spent on the  discharge of patient: 45 minutes         Maine Carbone MD  Department of Hospital Medicine  [facility] - Med Surg

## 2022-10-18 ENCOUNTER — HOSPITAL ENCOUNTER (EMERGENCY)
Facility: HOSPITAL | Age: 67
Discharge: HOME | End: 2022-10-18
Attending: EMERGENCY MEDICINE
Payer: COMMERCIAL

## 2022-10-18 VITALS
DIASTOLIC BLOOD PRESSURE: 61 MMHG | TEMPERATURE: 98 F | HEIGHT: 68 IN | RESPIRATION RATE: 16 BRPM | WEIGHT: 220 LBS | OXYGEN SATURATION: 94 % | SYSTOLIC BLOOD PRESSURE: 135 MMHG | BODY MASS INDEX: 33.34 KG/M2 | HEART RATE: 65 BPM

## 2022-10-18 DIAGNOSIS — S09.90XA TRAUMATIC INJURY OF HEAD, INITIAL ENCOUNTER: Primary | ICD-10-CM
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
## 2022-10-18 DIAGNOSIS — E04.1 THYROID NODULE: ICD-10-CM

## 2022-10-18 PROCEDURE — 25000003 PHARM REV CODE 250: Performed by: EMERGENCY MEDICINE

## 2022-10-18 PROCEDURE — 70450 CT HEAD/BRAIN W/O DYE: CPT | Mod: TC

## 2022-10-18 PROCEDURE — 72125 CT NECK SPINE W/O DYE: CPT | Mod: 26,,, | Performed by: RADIOLOGY

## 2022-10-18 PROCEDURE — 72125 CT CERVICAL SPINE WITHOUT CONTRAST: ICD-10-PCS | Mod: 26,,, | Performed by: RADIOLOGY

## 2022-10-18 PROCEDURE — 70450 CT HEAD WITHOUT CONTRAST: ICD-10-PCS | Mod: 26,,, | Performed by: RADIOLOGY

## 2022-10-18 PROCEDURE — 99284 EMERGENCY DEPT VISIT MOD MDM: CPT | Mod: 25

## 2022-10-18 PROCEDURE — 70450 CT HEAD/BRAIN W/O DYE: CPT | Mod: 26,,, | Performed by: RADIOLOGY

## 2022-10-18 PROCEDURE — 72125 CT NECK SPINE W/O DYE: CPT | Mod: TC

## 2022-10-18 RX ORDER — ACETAMINOPHEN 325 MG/1
650 TABLET ORAL
Status: COMPLETED | OUTPATIENT
Start: 2022-10-18 | End: 2022-10-18

## 2022-10-18 RX ADMIN — ACETAMINOPHEN 650 MG: 325 TABLET ORAL at 07:10

## 2022-10-18 NOTE — Clinical Note
"Antonia"Macario Polk was seen and treated in our emergency department on 10/18/2022.  She may return to work on 10/19/2022.       If you have any questions or concerns, please don't hesitate to call.      Ricardo Alcantar MD"

## 2022-10-18 NOTE — ED PROVIDER NOTES
Encounter Date: 10/18/2022       History     Chief Complaint   Patient presents with    Head Injury     Per EMS, patient was standing on a chair when she fell to the floor, landing on her back and hitting her head.  Patient complaining of head and jaw pain. 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  This morning at work she was trying to place a box on a shelf, and was standing on a chair to do so.  Somehow she fell, landing on her buttocks and lower back, was caused her to also strike the back of her head on the hard floor.  She denies any LOC but states she was stunned momentarily.  She is now complaining of pain in the back of her head and also mild generalized neck pain.  Ties back pain.  States her buttocks are somewhat sore but denies any pelvic pain or extremity pain.  No numbness, tingling, or weakness.  She was able to get up from her EMS stretcher and ambulate to her bed.  Past medical history of coronary artery disease with stenting, diabetes mellitus, hyperlipidemia, hypertension, and thyroid disease.  Past surgery includes cardiac pacemaker insertion, hysterectomy.  She has not had any medications as of yet for her pain or palpation of the hematoma the scalp increases her pain.    Review of patient's allergies indicates:   Allergen Reactions    Demerol [meperidine]     Diclofenac      Itching., GI issues.      Past Medical History:   Diagnosis Date    Coronary artery disease     Diabetes mellitus     Hyperlipidemia     Hypertension     MI (myocardial infarction)     Neuropathy     Plantar fasciitis     Thyroid disease      Past Surgical History:   Procedure Laterality Date    A-V CARDIAC PACEMAKER INSERTION      HYSTERECTOMY      INSERTION OF PACEMAKER      REMOVAL OF IMPLANTED CARDIOVERTER-DEFIBRILLATOR (ICD)       History reviewed. No pertinent family history.  Social History     Tobacco Use    Smoking status: Never    Smokeless tobacco: Never    Substance Use Topics    Alcohol use: Yes     Comment: occ    Drug use: Never     Review of Systems   Constitutional: Negative.    HENT: Negative.     Eyes: Negative.    Respiratory: Negative.     Cardiovascular: Negative.    Gastrointestinal: Negative.    Endocrine: Negative.    Genitourinary: Negative.    Musculoskeletal:  Positive for neck pain. Negative for back pain and gait problem.   Skin: Negative.    Neurological:  Positive for headaches. Negative for dizziness, seizures, syncope, weakness and numbness.   Psychiatric/Behavioral: Negative.  Negative for confusion. The patient is not nervous/anxious.      Physical Exam     Initial Vitals [10/18/22 0723]   BP Pulse Resp Temp SpO2   (!) 157/76 63 16 98.2 °F (36.8 °C) (!) 94 %      MAP       --         Physical Exam    Nursing note and vitals reviewed.  Constitutional: She appears well-developed and well-nourished. She is not diaphoretic. No distress.   HENT:   Head: Normocephalic.   Nose: Nose normal.   Mouth/Throat: Oropharynx is clear and moist. No oropharyngeal exudate.   There is tenderness to palpation over a hematoma and parietal/occipital region.  No bony step-offs noted.   Eyes: Conjunctivae and EOM are normal. Pupils are equal, round, and reactive to light. No scleral icterus.   Neck:    cervical collar in place.  Palpation of the cervical spine through the posterior opening of the collar does not reveal any bony step-offs or tenderness.   Cardiovascular:  Normal rate, regular rhythm, normal heart sounds and intact distal pulses.           No murmur heard.  Pulmonary/Chest: Breath sounds normal. No respiratory distress. She has no wheezes.   Abdominal: Abdomen is soft. Bowel sounds are normal. She exhibits no distension. There is no abdominal tenderness.   Musculoskeletal:         General: No tenderness or edema. Normal range of motion.     Neurological: She is alert and oriented to person, place, and time. She has normal strength and normal reflexes.  No cranial nerve deficit or sensory deficit. GCS score is 15. GCS eye subscore is 4. GCS verbal subscore is 5. GCS motor subscore is 6.   Skin: Skin is warm and dry. Capillary refill takes less than 2 seconds. No rash noted. No erythema.   Psychiatric: She has a normal mood and affect. Her behavior is normal.       ED Course   Procedures  Labs Reviewed - No data to display       Imaging Results              CT Cervical Spine Without Contrast (Final result)  Result time 10/18/22 09:10:07      Final result by Nick Bradley MD (10/18/22 09:10:07)                   Impression:      1. Mild multilevel degenerative disc disease resulting in bilateral neural foraminal narrowing.  2. Multinodular goiter.  Further evaluation with thyroid ultrasound as deemed clinically necessary.      Electronically signed by: Nick Bradley  Date:    10/18/2022  Time:    09:10               Narrative:    EXAMINATION:  CT CERVICAL SPINE WITHOUT CONTRAST    CLINICAL HISTORY:  Fall, head trauma;    TECHNIQUE:  Low dose axial images, sagittal and coronal reformations were performed though the cervical spine.  Contrast was not administered.    COMPARISON:  None    FINDINGS:  The cervical vertebral bodies are normal in height and alignment.  No acute fracture or subluxation.  No significant prevertebral soft tissue swelling.    There is mild multilevel degenerative disc disease resulting in bilateral neural foraminal narrowing.  Spinal canal is patent.    Visualized lung apices are clear.  Thyroid gland is prominent in size with small nodules and left coarse calcification.                                       CT Head Without Contrast (Final result)  Result time 10/18/22 09:07:21      Final result by Nick Bradley MD (10/18/22 09:07:21)                   Impression:      1. Cortical atrophy with periventricular deep white matter change consistent with chronic small vessel ischemic disease.  2. Occipital  cephalohematoma.      Electronically signed by: Nick Bradley  Date:    10/18/2022  Time:    09:07               Narrative:    EXAMINATION:  CT HEAD WITHOUT CONTRAST    CLINICAL HISTORY:  Head trauma, intracranial venous injury suspected;    TECHNIQUE:  Low dose axial images were obtained through the head.  Coronal and sagittal reformations were also performed. Contrast was not administered.    COMPARISON:  CT 10/18/2022.    FINDINGS:  There is no acute hemorrhage or infarction.  There is cortical atrophy.  There are periventricular deep white matter changes consistent with chronic small vessel ischemic disease.    No extra-axial fluid collections.  Ventricles are normal in size, shape and configuration.  The basal cisterns are patent.    The imaged paranasal sinuses and ethmoid air cells are well aerated.  The mastoid air cells and middle ears are normally pneumatized.    There is a moderate left occipital cephalohematoma.  No underlying bony fracture.                                    X-Rays:   Independently Interpreted Readings:   Other Readings:  Head CT and cervical spine CT were personally reviewed by me.  Head CT shows no evidence of fracture, no evidence of intracranial hemorrhage, no mass, no mass effect.  CT cervical spine shows arthritic changes, no fracture or subluxation.  There were some thyroid nodules seen which will need further classification by ultrasound ordered through primary care.  Patient was notified of all findings.  Medications   acetaminophen tablet 650 mg (650 mg Oral Given 10/18/22 0756)     Medical Decision Making:   Differential Diagnosis:   Skull fracture, cervical spine fracture, cervical strain, scalp hematoma, intracranial hemorrhage, mass, etc.  ED Management:  CT of brain and cervical spine showed no evidence of fractures, no spinal subluxation.  Chronic arthritic changes only.  Chronic age-related changes to the brain.  No hemorrhage, no mass, no mass effect.  Incidental  finding of thyroid nodules which will need ultrasound on an outpatient basis.  Patient and  were notified of all findings.  She will take Tylenol and Motrin for any further discomfort and she will follow-up with PCP.  Return here if worse in any way.                        Clinical Impression:   Final diagnoses:  [W19.XXXA] Fall, initial encounter  [S09.90XA] Traumatic injury of head, initial encounter (Primary)  [E04.1] Thyroid nodule      ED Disposition Condition    Discharge Stable          ED Prescriptions    None       Follow-up Information       Follow up With Specialties Details Why Contact Info    July Ritchie MD Internal Medicine In 2 days  [address] [facility] [address] [phone]      [facility] Emergency Dept Emergency Medicine  As needed, If symptoms worsen [address] [address] [phone]             Ricardo Alcantar MD  10/18/22 0963

## 2022-10-18 NOTE — DISCHARGE INSTRUCTIONS
Your CTs were negative for any fractures or internal bleeding.  CT did show some thyroid nodules which should be evaluated by ultrasound ordered by your primary care provider.  For discomfort, take Tylenol and Motrin.  Return here for any worsening signs or symptoms.

## 2022-12-08 ENCOUNTER — HOSPITAL ENCOUNTER (EMERGENCY)
Facility: HOSPITAL | Age: 67
Discharge: HOME | End: 2022-12-08
Payer: COMMERCIAL

## 2022-12-08 VITALS
OXYGEN SATURATION: 98 % | WEIGHT: 216 LBS | HEART RATE: 57 BPM | SYSTOLIC BLOOD PRESSURE: 142 MMHG | HEIGHT: 68 IN | DIASTOLIC BLOOD PRESSURE: 67 MMHG | BODY MASS INDEX: 32.74 KG/M2 | RESPIRATION RATE: 20 BRPM | TEMPERATURE: 98 F

## 2022-12-08 DIAGNOSIS — M25.461 EFFUSION OF RIGHT KNEE: Primary | ICD-10-CM

## 2022-12-08 DIAGNOSIS — M25.561 PAIN AND SWELLING OF KNEE, RIGHT: ICD-10-CM

## 2022-12-08 DIAGNOSIS — R60.9 SWELLING: ICD-10-CM

## 2022-12-08 DIAGNOSIS — M25.461 PAIN AND SWELLING OF KNEE, RIGHT: ICD-10-CM

## 2022-12-08 LAB
HCV AB SERPL QL IA: NORMAL
HIV 1+2 AB+HIV1 P24 AG SERPL QL IA: NORMAL

## 2022-12-08 PROCEDURE — 73562 X-RAY EXAM OF KNEE 3: CPT | Mod: TC,RT

## 2022-12-08 PROCEDURE — 25000003 PHARM REV CODE 250: Performed by: NURSE PRACTITIONER

## 2022-12-08 PROCEDURE — 93970 US LOWER EXTREMITY VEINS BILATERAL: ICD-10-PCS | Mod: 26,,, | Performed by: RADIOLOGY

## 2022-12-08 PROCEDURE — 73562 XR KNEE 3 VIEW RIGHT: ICD-10-PCS | Mod: 26,RT,, | Performed by: RADIOLOGY

## 2022-12-08 PROCEDURE — 63600175 PHARM REV CODE 636 W HCPCS: Performed by: NURSE PRACTITIONER

## 2022-12-08 PROCEDURE — 93970 EXTREMITY STUDY: CPT | Mod: TC

## 2022-12-08 PROCEDURE — 87389 HIV-1 AG W/HIV-1&-2 AB AG IA: CPT | Performed by: EMERGENCY MEDICINE

## 2022-12-08 PROCEDURE — 73562 X-RAY EXAM OF KNEE 3: CPT | Mod: 26,RT,, | Performed by: RADIOLOGY

## 2022-12-08 PROCEDURE — 96372 THER/PROPH/DIAG INJ SC/IM: CPT | Performed by: NURSE PRACTITIONER

## 2022-12-08 PROCEDURE — 99285 EMERGENCY DEPT VISIT HI MDM: CPT | Mod: 25

## 2022-12-08 PROCEDURE — 93970 EXTREMITY STUDY: CPT | Mod: 26,,, | Performed by: RADIOLOGY

## 2022-12-08 PROCEDURE — 86803 HEPATITIS C AB TEST: CPT | Performed by: EMERGENCY MEDICINE

## 2022-12-08 RX ORDER — TRAMADOL HYDROCHLORIDE 50 MG/1
50 TABLET ORAL
Status: COMPLETED | OUTPATIENT
Start: 2022-12-08 | End: 2022-12-08

## 2022-12-08 RX ORDER — PREDNISONE 20 MG/1
TABLET ORAL
Qty: 7 TABLET | Refills: 0 | Status: SHIPPED | OUTPATIENT
Start: 2022-12-08

## 2022-12-08 RX ORDER — DEXAMETHASONE SODIUM PHOSPHATE 10 MG/ML
6 INJECTION INTRAMUSCULAR; INTRAVENOUS
Status: COMPLETED | OUTPATIENT
Start: 2022-12-08 | End: 2022-12-08

## 2022-12-08 RX ORDER — TRAMADOL HYDROCHLORIDE 50 MG/1
50 TABLET ORAL EVERY 6 HOURS PRN
Qty: 15 TABLET | Refills: 0 | Status: SHIPPED | OUTPATIENT
Start: 2022-12-08

## 2022-12-08 RX ADMIN — TRAMADOL HYDROCHLORIDE 50 MG: 50 TABLET ORAL at 05:12

## 2022-12-08 RX ADMIN — DEXAMETHASONE SODIUM PHOSPHATE 6 MG: 10 INJECTION INTRAMUSCULAR; INTRAVENOUS at 05:12

## 2022-12-08 NOTE — DISCHARGE INSTRUCTIONS
Take medication as prescribed for pain and swelling of right knee.    Follow-up with orthopedics within the next 3-5 days.    Follow-up with primary care provider if needed.    Return emergency room if symptoms continue, worsen you develop any new other worrisome symptom.

## 2022-12-08 NOTE — ED PROVIDER NOTES
Encounter Date: 12/8/2022       History     Chief Complaint   Patient presents with    Knee Pain     Patient states right knee pain and swelling for 3-4 days that has been relieved by ibuprofen.  Today the pain is much worse and not relieved by OTC meds.     Patient is 67-year-old female who presents emergency room with right knee swelling and lower leg swelling.  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  Patient states she fell on her knee sometime ago, but has noticed over the past couple days at the swelling right knee has progressively gotten worse.  Patient states she was worried about having a DVT secondary to her leg swelling worse.  She does states she has pain upon significant flexion and extension of her right knee.  She denies any fever, chills, nausea, vomiting, diarrhea, chest pain shortness of breath.    Review of patient's allergies indicates:   Allergen Reactions    Demerol [meperidine]     Diclofenac      Itching., GI issues.      Past Medical History:   Diagnosis Date    Coronary artery disease     Diabetes mellitus     Hyperlipidemia     Hypertension     MI (myocardial infarction)     Neuropathy     Plantar fasciitis     Thyroid disease      Past Surgical History:   Procedure Laterality Date    A-V CARDIAC PACEMAKER INSERTION      HYSTERECTOMY      INSERTION OF PACEMAKER      REMOVAL OF IMPLANTED CARDIOVERTER-DEFIBRILLATOR (ICD)       History reviewed. No pertinent family history.  Social History     Tobacco Use    Smoking status: Never    Smokeless tobacco: Never   Substance Use Topics    Alcohol use: Yes     Comment: occ    Drug use: Never     Review of Systems   Constitutional: Negative.    HENT: Negative.     Eyes: Negative.    Respiratory: Negative.     Cardiovascular: Negative.    Gastrointestinal: Negative.    Endocrine: Negative.    Genitourinary: Negative.    Musculoskeletal:  Positive for joint swelling (Right  knee).   Skin: Negative.    Allergic/Immunologic: Negative for food allergies.   Neurological: Negative.    Hematological: Negative.    Psychiatric/Behavioral: Negative.     All other systems reviewed and are negative.    Physical Exam     Initial Vitals [12/08/22 1448]   BP Pulse Resp Temp SpO2   139/62 (!) 57 16 98.1 °F (36.7 °C) 100 %      MAP       --         Physical Exam    Nursing note and vitals reviewed.  Constitutional: She appears well-developed and well-nourished. She is not diaphoretic. No distress.   HENT:   Head: Normocephalic and atraumatic.   Mouth/Throat: Oropharynx is clear and moist.   Eyes: Pupils are equal, round, and reactive to light.   Neck:   Normal range of motion.  Cardiovascular:  Normal rate, regular rhythm, normal heart sounds and intact distal pulses.           No murmur heard.  Pulmonary/Chest: Breath sounds normal. No respiratory distress. She has no wheezes. She has no rhonchi. She has no rales.   Abdominal: Abdomen is soft.   Musculoskeletal:         General: Tenderness (Right knee tenderness to palpation to suprapatellar area secondary to possible effusion.  Tenderness with range of motion to right knee) and edema (Right knee, and trace to the lower extremity) present.      Cervical back: Normal range of motion.      Right knee: Swelling and effusion present. No deformity or crepitus. Decreased range of motion. Tenderness present. No LCL laxity, MCL laxity, ACL laxity or PCL laxity.      Instability Tests: Anterior drawer test negative. Posterior drawer test negative. Anterior Lachman test negative. Medial Valentine test negative and lateral Valentine test negative.     Skin: Skin is warm and dry. Capillary refill takes 2 to 3 seconds.   Psychiatric: She has a normal mood and affect.       ED Course   Procedures  Labs Reviewed   HIV 1 / 2 ANTIBODY   HEPATITIS C ANTIBODY          Imaging Results              X-Ray Knee 3 View Right (Final result)  Result time 12/08/22 16:12:52       Final result by Nick Bradley MD (12/08/22 16:12:52)                   Impression:      1. Mild degenerative osteoarthrosis of the medial compartment.  2. Moderate suprapatellar effusion.      Electronically signed by: Nick Bradley  Date:    12/08/2022  Time:    16:12               Narrative:    EXAMINATION:  XR KNEE 3 VIEW RIGHT    CLINICAL HISTORY:  Pain in right knee    TECHNIQUE:  AP and lateral views of the right knee.    COMPARISON:  None    FINDINGS:  Mild degenerative osteoarthrosis of the medial compartment with mild joint space narrowing and small osteophyte formation.  Lateral compartment joint space is preserved.    Patellofemoral articulation intact.  Quadriceps enthesophyte formation.  There is a moderate suprapatellar effusion.                                       US Lower Extremity Veins Bilateral (Final result)  Result time 12/08/22 16:10:46   Procedure changed from CV Ultrasound doppler venous DVT leg right     Final result by Nick Bradley MD (12/08/22 16:10:46)                   Impression:      No evidence of deep venous thrombosis in either lower extremity.      Electronically signed by: Nick Bradley  Date:    12/08/2022  Time:    16:10               Narrative:    EXAMINATION:  US LOWER EXTREMITY VEINS BILATERAL    CLINICAL HISTORY:  swelling; Edema, unspecified    TECHNIQUE:  Duplex and color flow Doppler and dynamic compression was performed of the bilateral lower extremity veins was performed.    COMPARISON:  None    FINDINGS:  Right thigh veins: The common femoral, femoral, popliteal, upper greater saphenous, and deep femoral veins are patent and free of thrombus. The veins are normally compressible and have normal phasic flow and augmentation response.    Right calf veins: The visualized calf veins are patent.    Left thigh veins: The common femoral, femoral, popliteal, upper greater saphenous, and deep femoral veins are patent and free of thrombus. The veins are normally  compressible and have normal phasic flow and augmentation response.    Left calf veins: The visualized calf veins are patent.    Miscellaneous: None                                    X-Rays:   Independently Interpreted Readings:   Other Readings:  AP and lateral views of the right knee.     COMPARISON:  None     FINDINGS:  Mild degenerative osteoarthrosis of the medial compartment with mild joint space narrowing and small osteophyte formation.  Lateral compartment joint space is preserved.     Patellofemoral articulation intact.  Quadriceps enthesophyte formation.  There is a moderate suprapatellar effusion.     Impression:     1. Mild degenerative osteoarthrosis of the medial compartment.  2. Moderate suprapatellar effusion.        Electronically signed by: Nick Bradley  Date:                                            12/08/2022    Duplex and color flow Doppler and dynamic compression was performed of the bilateral lower extremity veins was performed.     COMPARISON:  None     FINDINGS:  Right thigh veins: The common femoral, femoral, popliteal, upper greater saphenous, and deep femoral veins are patent and free of thrombus. The veins are normally compressible and have normal phasic flow and augmentation response.     Right calf veins: The visualized calf veins are patent.     Left thigh veins: The common femoral, femoral, popliteal, upper greater saphenous, and deep femoral veins are patent and free of thrombus. The veins are normally compressible and have normal phasic flow and augmentation response.     Left calf veins: The visualized calf veins are patent.     Miscellaneous: None     Impression:     No evidence of deep venous thrombosis in either lower extremity.        Electronically signed by: Nick Bradley  Date:                                            12/08/2022  Medications   traMADoL tablet 50 mg (has no administration in time range)   dexAMETHasone sodium phos (PF) injection 6 mg (6 mg  Intramuscular Given 12/8/22 7737)     Medical Decision Making:   Initial Assessment:   Patient seen examined emergency room.  No acute distress at this time.  Detailed assessment as noted above.  Differential Diagnosis:   Fracture, dislocation, effusion, DVT, arthritis  Clinical Tests:   Radiological Study: Ordered and Reviewed  ED Management:  Patient was seen examined emergency room.  X-ray and ultrasound was reviewed.  Patient was given Ultram for pain, 4 mg IM Decadron for knee effusion.  Ace wrap was placed to the right knee.  Encouraged patient to follow-up with orthopedics for further evaluation of knee effusion.  Will prescribe the patient Ultram for severe pain, and prednisone for the next 5 days.                         Clinical Impression:   Final diagnoses:  [R60.9] Swelling  [M25.561, M25.461] Pain and swelling of knee, right  [M25.461] Effusion of right knee (Primary)        ED Disposition Condition    Discharge Stable          ED Prescriptions       Medication Sig Dispense Start Date End Date Auth. Provider    predniSONE (DELTASONE) 20 MG tablet Take 2 tablets for 2 days, then 1 tab for 2 days, then half tab for 2 days 7 tablet 12/8/2022 -- Andrew Ochoa NP    traMADoL (ULTRAM) 50 mg tablet Take 1 tablet (50 mg total) by mouth every 6 (six) hours as needed for Pain. 15 tablet 12/8/2022 -- Andrew Ochoa NP          Follow-up Information       Follow up With Specialties Details Why Contact Info    July Ritchie MD Internal Medicine In 1 week If symptoms worsen, As [facility] [address] [facility] [address]  [phone]               Andrew Ochoa NP  12/08/22 0261

## 2022-12-08 NOTE — Clinical Note
"Antonia"Macario Polk was seen and treated in our emergency department on 12/8/2022.  She may return to work on 12/09/2022.  Limit standing until follow-up with Orthopedics.     If you have any questions or concerns, please don't hesitate to call.      Andrew Ochoa NP"

## 2022-12-09 ENCOUNTER — TELEPHONE (OUTPATIENT)
Dept: ORTHOPEDICS | Facility: CLINIC | Age: 67
End: 2022-12-09
Payer: COMMERCIAL

## 2022-12-12 ENCOUNTER — TELEPHONE (OUTPATIENT)
Dept: ORTHOPEDICS | Facility: CLINIC | Age: 67
End: 2022-12-12
Payer: COMMERCIAL

## 2022-12-13 ENCOUNTER — TELEPHONE (OUTPATIENT)
Dept: ORTHOPEDICS | Facility: CLINIC | Age: 67
End: 2022-12-13
Payer: COMMERCIAL

## 2024-02-16 ENCOUNTER — HOSPITAL ENCOUNTER (EMERGENCY)
Facility: HOSPITAL | Age: 69
Discharge: HOME | End: 2024-02-16
Attending: EMERGENCY MEDICINE
Payer: COMMERCIAL

## 2024-02-16 VITALS
OXYGEN SATURATION: 100 % | DIASTOLIC BLOOD PRESSURE: 69 MMHG | TEMPERATURE: 98 F | SYSTOLIC BLOOD PRESSURE: 126 MMHG | HEIGHT: 68 IN | BODY MASS INDEX: 27.89 KG/M2 | HEART RATE: 54 BPM | WEIGHT: 184 LBS | RESPIRATION RATE: 19 BRPM

## 2024-02-16 DIAGNOSIS — R42 VERTIGO: Primary | ICD-10-CM

## 2024-02-16 LAB
HCV AB SERPL QL IA: NORMAL
HIV 1+2 AB+HIV1 P24 AG SERPL QL IA: NORMAL

## 2024-02-16 PROCEDURE — 87389 HIV-1 AG W/HIV-1&-2 AB AG IA: CPT | Performed by: EMERGENCY MEDICINE

## 2024-02-16 PROCEDURE — 86803 HEPATITIS C AB TEST: CPT | Performed by: EMERGENCY MEDICINE

## 2024-02-16 PROCEDURE — 25000003 PHARM REV CODE 250: Performed by: EMERGENCY MEDICINE

## 2024-02-16 PROCEDURE — 99283 EMERGENCY DEPT VISIT LOW MDM: CPT

## 2024-02-16 RX ORDER — MECLIZINE HCL 12.5 MG 12.5 MG/1
50 TABLET ORAL
Status: COMPLETED | OUTPATIENT
Start: 2024-02-16 | End: 2024-02-16

## 2024-02-16 RX ORDER — MECLIZINE HYDROCHLORIDE 25 MG/1
25 TABLET ORAL 3 TIMES DAILY PRN
Qty: 20 TABLET | Refills: 0 | Status: SHIPPED | OUTPATIENT
Start: 2024-02-16

## 2024-02-16 RX ADMIN — MECLIZINE HYDROCHLORIDE 50 MG: 12.5 TABLET ORAL at 01:02

## 2024-02-16 NOTE — DISCHARGE INSTRUCTIONS
Take meclizine as prescribed for dizziness.  Drink plenty of liquids and stay well hydrated.  Follow-up with Dr. Frederick on Monday.  Call his office 1st thing Monday morning.  Also follow-up with your primary care provider.  Return here as needed or if worse in any way.

## 2024-03-02 NOTE — ED PROVIDER NOTES
"Encounter Date: 2/16/2024       History     Chief Complaint   Patient presents with    Dizziness     Dizziness since waking up. One episode of vomiting. Denies chest pain, nausea or diarrhea. 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     68-year-old female here from home via private vehicle for evaluation and treatment of dizziness.  Symptoms 1st noticed when she woke up this morning.  Denies any numbness, tingling, or extremity weakness.  No facial droop or speech changes.  No visual problems.  Patient has never been dizzy before, but does state that she has ear problems in his deaf in 1 ear.  Movement of the head exacerbates symptoms.      Review of patient's allergies indicates:   Allergen Reactions    Demerol [meperidine]     Diclofenac      Itching., GI issues.      Past Medical History:   Diagnosis Date    Coronary artery disease     Diabetes mellitus     Hyperlipidemia     Hypertension     MI (myocardial infarction)     Neuropathy     Plantar fasciitis     Thyroid disease      Past Surgical History:   Procedure Laterality Date    A-V CARDIAC PACEMAKER INSERTION      HYSTERECTOMY      INSERTION OF PACEMAKER      REMOVAL OF IMPLANTED CARDIOVERTER-DEFIBRILLATOR (ICD)       History reviewed. No pertinent family history.  Social History     Tobacco Use    Smoking status: Never    Smokeless tobacco: Never   Substance Use Topics    Alcohol use: Yes     Comment: occ    Drug use: Never     Review of Systems   Constitutional: Negative.    HENT: Negative.     Eyes: Negative.    Respiratory: Negative.     Cardiovascular: Negative.    Gastrointestinal:  Positive for nausea and vomiting.   Endocrine: Negative.    Genitourinary: Negative.    Musculoskeletal: Negative.    Neurological:  Positive for dizziness.   Psychiatric/Behavioral: Negative.         Physical Exam     Initial Vitals [02/16/24 1110]   BP Pulse Resp Temp SpO2   129/64 (!) " 54 18 97.5 °F (36.4 °C) 97 %      MAP       --         Physical Exam    Nursing note and vitals reviewed.  Constitutional: She appears well-developed and well-nourished. No distress.   HENT:   Head: Normocephalic and atraumatic.   Nose: Nose normal.   Mouth/Throat: Oropharynx is clear and moist. No oropharyngeal exudate.   Eyes: Conjunctivae and EOM are normal. Pupils are equal, round, and reactive to light. No scleral icterus.   Neck: Neck supple. No JVD present.   Normal range of motion.  Cardiovascular:  Normal rate, regular rhythm, normal heart sounds and intact distal pulses.           No murmur heard.  Pulmonary/Chest: Breath sounds normal. No stridor. No respiratory distress. She has no wheezes.   Abdominal: Abdomen is soft. Bowel sounds are normal. She exhibits no distension. There is no abdominal tenderness.   Musculoskeletal:         General: No tenderness or edema. Normal range of motion.      Cervical back: Normal range of motion and neck supple.     Neurological: She is alert and oriented to person, place, and time. She has normal strength. No cranial nerve deficit or sensory deficit. GCS score is 15. GCS eye subscore is 4. GCS verbal subscore is 5. GCS motor subscore is 6.   Skin: Skin is warm and dry. Capillary refill takes less than 2 seconds. No rash noted. No erythema.   Psychiatric: She has a normal mood and affect.         ED Course   Procedures  Labs Reviewed   HIV 1 / 2 ANTIBODY    Narrative:     Release to patient->Immediate   HEPATITIS C ANTIBODY    Narrative:     Release to patient->Immediate          Imaging Results    None          Medications   meclizine tablet 50 mg (50 mg Oral Given 2/16/24 1313)     Medical Decision Making  Differential includes vertigo, CVA, dehydration, etc.    Patient given meclizine here in the emergency department.  Reports relief.  No other red flags or alarming symptoms.  Patient wants to try Antivert at home.  She will follow-up with PCP and ENT.  Return here  as needed or if worse in any way.    Amount and/or Complexity of Data Reviewed  Labs: ordered.                                      Clinical Impression:  Final diagnoses:  [R42] Vertigo (Primary)          ED Disposition Condition    Discharge Stable          ED Prescriptions       Medication Sig Dispense Start Date End Date Auth. Provider    meclizine (ANTIVERT) 25 mg tablet Take 1 tablet (25 mg total) by mouth 3 (three) times daily as needed for Dizziness. 20 tablet 2/16/2024 -- Ricardo Alcantar MD          Follow-up Information       Follow up With Specialties Details Why Contact Info    July Ritchie MD Internal Medicine In 3 days  [address] [facility] [place] [address]  [phone]      Sekou Frederick MD Otolaryngology Call in 3 days  [address] [facility] [place] [address]  [phone]      [facility] Emergency Dept Emergency Medicine  As needed, If symptoms worsen [address] [facility] [address] [phone]             Ricardo Alcantar MD  03/02/24 0713       Ricardo Alcantar MD  03/07/24 075

## 2024-12-17 ENCOUNTER — HOSPITAL ENCOUNTER (EMERGENCY)
Facility: HOSPITAL | Age: 69
Discharge: HOME | End: 2024-12-17
Attending: EMERGENCY MEDICINE
Payer: MEDICARE

## 2024-12-17 VITALS
SYSTOLIC BLOOD PRESSURE: 102 MMHG | BODY MASS INDEX: 27.28 KG/M2 | RESPIRATION RATE: 20 BRPM | OXYGEN SATURATION: 95 % | HEIGHT: 68 IN | TEMPERATURE: 98 F | DIASTOLIC BLOOD PRESSURE: 55 MMHG | HEART RATE: 57 BPM | WEIGHT: 180 LBS

## 2024-12-17 DIAGNOSIS — R00.0 TACHYCARDIA: Primary | ICD-10-CM

## 2024-12-17 DIAGNOSIS — Z79.899: ICD-10-CM

## 2024-12-17 LAB
ALBUMIN SERPL BCP-MCNC: 3.8 G/DL (ref 3.5–5.2)
ALP SERPL-CCNC: 56 U/L (ref 40–150)
ALT SERPL W/O P-5'-P-CCNC: 22 U/L (ref 10–44)
ANION GAP SERPL CALC-SCNC: 12 MMOL/L (ref 8–16)
AST SERPL-CCNC: 24 U/L (ref 10–40)
BASOPHILS # BLD AUTO: 0.02 K/UL (ref 0–0.2)
BASOPHILS NFR BLD: 0.3 % (ref 0–1.9)
BILIRUB SERPL-MCNC: 0.6 MG/DL (ref 0.1–1)
BILIRUB UR QL STRIP: NEGATIVE
BNP SERPL-MCNC: 194 PG/ML (ref 0–99)
BUN SERPL-MCNC: 28 MG/DL (ref 8–23)
CALCIUM SERPL-MCNC: 9.4 MG/DL (ref 8.7–10.5)
CHLORIDE SERPL-SCNC: 106 MMOL/L (ref 95–110)
CLARITY UR: CLEAR
CO2 SERPL-SCNC: 22 MMOL/L (ref 23–29)
COLOR UR: YELLOW
CREAT SERPL-MCNC: 0.9 MG/DL (ref 0.5–1.4)
D DIMER PPP IA.FEU-MCNC: 0.57 MG/L FEU
DIFFERENTIAL METHOD BLD: ABNORMAL
EOSINOPHIL # BLD AUTO: 0.1 K/UL (ref 0–0.5)
EOSINOPHIL NFR BLD: 2 % (ref 0–8)
ERYTHROCYTE [DISTWIDTH] IN BLOOD BY AUTOMATED COUNT: 12.7 % (ref 11.5–14.5)
EST. GFR  (NO RACE VARIABLE): >60 ML/MIN/1.73 M^2
GLUCOSE SERPL-MCNC: 156 MG/DL (ref 70–110)
GLUCOSE UR QL STRIP: ABNORMAL
HCT VFR BLD AUTO: 42.8 % (ref 37–48.5)
HGB BLD-MCNC: 14.4 G/DL (ref 12–16)
HGB UR QL STRIP: NEGATIVE
IMM GRANULOCYTES # BLD AUTO: 0.03 K/UL (ref 0–0.04)
IMM GRANULOCYTES NFR BLD AUTO: 0.5 % (ref 0–0.5)
INR PPP: 1 (ref 0.8–1.2)
KETONES UR QL STRIP: NEGATIVE
LEUKOCYTE ESTERASE UR QL STRIP: NEGATIVE
LYMPHOCYTES # BLD AUTO: 2.8 K/UL (ref 1–4.8)
LYMPHOCYTES NFR BLD: 42.8 % (ref 18–48)
MAGNESIUM SERPL-MCNC: 2.1 MG/DL (ref 1.6–2.6)
MCH RBC QN AUTO: 31.6 PG (ref 27–31)
MCHC RBC AUTO-ENTMCNC: 33.6 G/DL (ref 32–36)
MCV RBC AUTO: 94 FL (ref 82–98)
MONOCYTES # BLD AUTO: 0.6 K/UL (ref 0.3–1)
MONOCYTES NFR BLD: 8.9 % (ref 4–15)
NEUTROPHILS # BLD AUTO: 2.9 K/UL (ref 1.8–7.7)
NEUTROPHILS NFR BLD: 45.5 % (ref 38–73)
NITRITE UR QL STRIP: NEGATIVE
NRBC BLD-RTO: 0 /100 WBC
PH UR STRIP: 7 [PH] (ref 5–8)
PLATELET # BLD AUTO: 202 K/UL (ref 150–450)
PMV BLD AUTO: 9 FL (ref 9.2–12.9)
POTASSIUM SERPL-SCNC: 3.9 MMOL/L (ref 3.5–5.1)
PROT SERPL-MCNC: 7.3 G/DL (ref 6–8.4)
PROT UR QL STRIP: NEGATIVE
PROTHROMBIN TIME: 10.5 SEC (ref 9–12.5)
RBC # BLD AUTO: 4.55 M/UL (ref 4–5.4)
SODIUM SERPL-SCNC: 140 MMOL/L (ref 136–145)
SP GR UR STRIP: 1.01 (ref 1–1.03)
T4 FREE SERPL-MCNC: 1.25 NG/DL (ref 0.71–1.51)
TROPONIN I SERPL DL<=0.01 NG/ML-MCNC: <0.006 NG/ML (ref 0–0.03)
TROPONIN I SERPL DL<=0.01 NG/ML-MCNC: <0.006 NG/ML (ref 0–0.03)
TSH SERPL DL<=0.005 MIU/L-ACNC: 4.17 UIU/ML (ref 0.4–4)
URN SPEC COLLECT METH UR: ABNORMAL
UROBILINOGEN UR STRIP-ACNC: NEGATIVE EU/DL
WBC # BLD AUTO: 6.43 K/UL (ref 3.9–12.7)

## 2024-12-17 PROCEDURE — 81003 URINALYSIS AUTO W/O SCOPE: CPT | Performed by: EMERGENCY MEDICINE

## 2024-12-17 PROCEDURE — 71045 X-RAY EXAM CHEST 1 VIEW: CPT | Mod: 26,,, | Performed by: RADIOLOGY

## 2024-12-17 PROCEDURE — 85379 FIBRIN DEGRADATION QUANT: CPT | Performed by: EMERGENCY MEDICINE

## 2024-12-17 PROCEDURE — 80053 COMPREHEN METABOLIC PANEL: CPT | Performed by: EMERGENCY MEDICINE

## 2024-12-17 PROCEDURE — 85025 COMPLETE CBC W/AUTO DIFF WBC: CPT | Performed by: EMERGENCY MEDICINE

## 2024-12-17 PROCEDURE — 83735 ASSAY OF MAGNESIUM: CPT | Performed by: EMERGENCY MEDICINE

## 2024-12-17 PROCEDURE — 99285 EMERGENCY DEPT VISIT HI MDM: CPT | Mod: 25

## 2024-12-17 PROCEDURE — 83880 ASSAY OF NATRIURETIC PEPTIDE: CPT | Performed by: EMERGENCY MEDICINE

## 2024-12-17 PROCEDURE — 84439 ASSAY OF FREE THYROXINE: CPT | Performed by: EMERGENCY MEDICINE

## 2024-12-17 PROCEDURE — 93010 ELECTROCARDIOGRAM REPORT: CPT | Mod: ,,, | Performed by: INTERNAL MEDICINE

## 2024-12-17 PROCEDURE — 93005 ELECTROCARDIOGRAM TRACING: CPT

## 2024-12-17 PROCEDURE — 96374 THER/PROPH/DIAG INJ IV PUSH: CPT

## 2024-12-17 PROCEDURE — 84484 ASSAY OF TROPONIN QUANT: CPT | Mod: 91 | Performed by: EMERGENCY MEDICINE

## 2024-12-17 PROCEDURE — 71045 X-RAY EXAM CHEST 1 VIEW: CPT | Mod: TC

## 2024-12-17 PROCEDURE — 85610 PROTHROMBIN TIME: CPT | Performed by: EMERGENCY MEDICINE

## 2024-12-17 PROCEDURE — 25000003 PHARM REV CODE 250: Performed by: EMERGENCY MEDICINE

## 2024-12-17 PROCEDURE — 84443 ASSAY THYROID STIM HORMONE: CPT | Performed by: EMERGENCY MEDICINE

## 2024-12-17 RX ORDER — METOPROLOL TARTRATE 1 MG/ML
5 INJECTION, SOLUTION INTRAVENOUS
Status: COMPLETED | OUTPATIENT
Start: 2024-12-17 | End: 2024-12-17

## 2024-12-17 RX ORDER — ASPIRIN 325 MG
325 TABLET ORAL
Status: COMPLETED | OUTPATIENT
Start: 2024-12-17 | End: 2024-12-17

## 2024-12-17 RX ORDER — AMIODARONE HYDROCHLORIDE 100 MG/1
100 TABLET ORAL
COMMUNITY

## 2024-12-17 RX ORDER — PHENAZOPYRIDINE HYDROCHLORIDE 200 MG/1
200 TABLET, FILM COATED ORAL
COMMUNITY
Start: 2022-07-26

## 2024-12-17 RX ORDER — LEVALBUTEROL TARTRATE 45 UG/1
2 AEROSOL, METERED ORAL EVERY 6 HOURS PRN
COMMUNITY

## 2024-12-17 RX ADMIN — ASPIRIN 325 MG ORAL TABLET 325 MG: 325 PILL ORAL at 06:12

## 2024-12-17 RX ADMIN — METOROPROLOL TARTRATE 5 MG: 5 INJECTION, SOLUTION INTRAVENOUS at 06:12

## 2024-12-18 LAB
OHS QRS DURATION: 176 MS
OHS QTC CALCULATION: 627 MS

## 2024-12-18 NOTE — ED PROVIDER NOTES
Encounter Date: 12/17/2024       History     Chief Complaint   Patient presents with    Palpitations    Cough     Patient is a 69-year-old female with history of hypertension, diabetes, heart disease requiring pacer defibrillator, here with  both reporting patient had acute onset of tachycardia about 30 minutes ago.  No chest pain but patient does report tightness that radiates into her jaw.  No febrile symptoms.  Jaw ache and chest tightness has left but tachycardia has persisted.    The history is provided by the patient, the spouse and medical records.     Review of patient's allergies indicates:   Allergen Reactions    Demerol [meperidine]     Diclofenac      Itching., GI issues.      Past Medical History:   Diagnosis Date    Coronary artery disease     Diabetes mellitus     Hyperlipidemia     Hypertension     MI (myocardial infarction)     Neuropathy     Plantar fasciitis     Thyroid disease      Past Surgical History:   Procedure Laterality Date    A-V CARDIAC PACEMAKER INSERTION      HYSTERECTOMY      INSERTION OF PACEMAKER      REMOVAL OF IMPLANTED CARDIOVERTER-DEFIBRILLATOR (ICD)       No family history on file.  Social History     Tobacco Use    Smoking status: Never    Smokeless tobacco: Never   Substance Use Topics    Alcohol use: Yes     Comment: occ    Drug use: Never     Review of Systems   Respiratory:  Positive for chest tightness.    Cardiovascular:  Positive for palpitations.   All other systems reviewed and are negative.      Physical Exam     Initial Vitals   BP Pulse Resp Temp SpO2   12/17/24 1819 12/17/24 1819 12/17/24 1819 12/17/24 1824 12/17/24 1819   136/87 (!) 132 20 98 °F (36.7 °C) 96 %      MAP       --                Physical Exam    Nursing note and vitals reviewed.  Constitutional: She appears well-developed.   Well-developed, obese elderly female in no obvious distress.   HENT:   Head: Normocephalic and atraumatic.   Eyes: EOM are normal.   Neck: Neck supple.   Cardiovascular:             Tachycardic.  Normal cap refill.   Pulmonary/Chest: No respiratory distress.   Abdominal: Abdomen is soft. There is no abdominal tenderness.   Musculoskeletal:         General: No edema.      Cervical back: Neck supple.     Neurological: She is alert and oriented to person, place, and time.   GAF, GCS 15   Skin: Skin is warm and dry.   Psychiatric: She has a normal mood and affect.         ED Course   Procedures  Labs Reviewed   D DIMER, QUANTITATIVE - Abnormal       Result Value    D-Dimer 0.57 (*)    URINALYSIS, REFLEX TO URINE CULTURE - Abnormal    Specimen UA Urine, Unspecified      Color, UA Yellow      Appearance, UA Clear      pH, UA 7.0      Specific Gravity, UA 1.010      Protein, UA Negative      Glucose, UA 2+ (*)     Ketones, UA Negative      Bilirubin (UA) Negative      Occult Blood UA Negative      Nitrite, UA Negative      Urobilinogen, UA Negative      Leukocytes, UA Negative      Narrative:     Preferred Collection Type->Urine, Clean Catch  Specimen Source->Urine   TSH - Abnormal    TSH 4.173 (*)    CBC W/ AUTO DIFFERENTIAL - Abnormal    WBC 6.43      RBC 4.55      Hemoglobin 14.4      Hematocrit 42.8      MCV 94      MCH 31.6 (*)     MCHC 33.6      RDW 12.7      Platelets 202      MPV 9.0 (*)     Immature Granulocytes 0.5      Gran # (ANC) 2.9      Immature Grans (Abs) 0.03      Lymph # 2.8      Mono # 0.6      Eos # 0.1      Baso # 0.02      nRBC 0      Gran % 45.5      Lymph % 42.8      Mono % 8.9      Eosinophil % 2.0      Basophil % 0.3      Differential Method Automated     COMPREHENSIVE METABOLIC PANEL - Abnormal    Sodium 140      Potassium 3.9      Chloride 106      CO2 22 (*)     Glucose 156 (*)     BUN 28 (*)     Creatinine 0.9      Calcium 9.4      Total Protein 7.3      Albumin 3.8      Total Bilirubin 0.6      Alkaline Phosphatase 56      AST 24      ALT 22      eGFR >60.0      Anion Gap 12     B-TYPE NATRIURETIC PEPTIDE - Abnormal     (*)    MAGNESIUM    Magnesium  2.1     TROPONIN I    Troponin I <0.006     PROTIME-INR    Prothrombin Time 10.5      INR 1.0     T4, FREE    Free T4 1.25     TROPONIN I    Troponin I <0.006       EKG Readings: (Independently Interpreted)   # 1 EKG @ 18:11 read 18:14  Wide complex tachycardia at 129 beats per minute, , long QT, left axis RBBB, T-wave abnormalities most likely rate dependent, no ectopy.    #2 EKG @ 19:40 read 19:48  Sinus rhythm at 58 beats per minute, first-degree heart block, normal QRS, normal QT, left axis, possible old anterior infarct, no ischemic ST elevation, no ectopy.       Imaging Results              X-Ray Chest AP Portable (Final result)  Result time 12/17/24 19:33:57      Final result by Carina Kaye MD (12/17/24 19:33:57)                   Impression:      As above      Electronically signed by: Carina Kaye  Date:    12/17/2024  Time:    19:33               Narrative:    EXAMINATION:  XR CHEST AP PORTABLE    CLINICAL HISTORY:  Tachycardia;    TECHNIQUE:  Single frontal view of the chest was performed.    COMPARISON:  04/25/2022    FINDINGS:  Left chest wall AICD.  Mildly enlarged cardiac silhouette.  No focal consolidation.                                       Medications   aspirin tablet 325 mg (325 mg Oral Given 12/17/24 1828)   metoprolol injection 5 mg (5 mg Intravenous Given 12/17/24 1829)     Medical Decision Making  Patient is a 69-year-old female with history of hypertension, diabetes, heart disease requiring pacer defibrillator, here with  both reporting patient had acute onset of tachycardia about 30 minutes ago.  No chest pain but patient does report tightness that radiates into her jaw.  No febrile symptoms.  Jaw ache and chest tightness has left but tachycardia has persisted.  We will send labs, control symptoms and expand workup as needed.  Differential diagnosis:  Tachydysrhythmia, metabolic derangement, PE, coronary syndrome, infectious precipitator.    Amount and/or  Complexity of Data Reviewed  Labs: ordered.  Radiology: ordered.    Risk  OTC drugs.  Prescription drug management.               ED Course as of 12/17/24 2150   Tue Dec 17, 2024   1937 Rate 58 beats per minute after Lopressor.  We will repeat EKG and dispo accordingly. [RJ]   2030 Patient looks and feels better.  We will repeat troponin 2 hours.  D-dimer mildly elevated but negligible. [RJ]      ED Course User Index  [RJ] Nick Pascual MD                           Clinical Impression:  Final diagnoses:  [Z79.899] Good response to medication  [R00.0] Tachycardia (Primary)          ED Disposition Condition    Discharge Stable          ED Prescriptions    None       Follow-up Information       Follow up With Specialties Details Why Contact Info    [facility] Emergency Dept Emergency Medicine  As needed [address] [address] [phone]    Your cardiologist  Call                Nick Pascual MD  12/17/24 2150

## 2024-12-18 NOTE — ED NOTES
Pt report received from OZ Spears.  Pt resting comfortably in bed at this time.  NAD noted.  Family at bedside.  Pt denies any needs at this time.

## 2025-08-25 ENCOUNTER — HOSPITAL ENCOUNTER (EMERGENCY)
Facility: HOSPITAL | Age: 70
Discharge: HOME | End: 2025-08-26
Attending: EMERGENCY MEDICINE
Payer: MEDICARE